# Patient Record
Sex: MALE | Race: WHITE | Employment: FULL TIME | URBAN - METROPOLITAN AREA
[De-identification: names, ages, dates, MRNs, and addresses within clinical notes are randomized per-mention and may not be internally consistent; named-entity substitution may affect disease eponyms.]

---

## 2017-06-07 DIAGNOSIS — I10 ESSENTIAL HYPERTENSION: ICD-10-CM

## 2017-06-09 RX ORDER — LISINOPRIL 20 MG/1
TABLET ORAL
Qty: 30 TABLET | Refills: 0 | Status: SHIPPED | OUTPATIENT
Start: 2017-06-09 | End: 2017-06-21 | Stop reason: SDUPTHER

## 2017-06-21 ENCOUNTER — OFFICE VISIT (OUTPATIENT)
Dept: FAMILY MEDICINE CLINIC | Age: 62
End: 2017-06-21

## 2017-06-21 VITALS
RESPIRATION RATE: 16 BRPM | DIASTOLIC BLOOD PRESSURE: 78 MMHG | HEIGHT: 71 IN | BODY MASS INDEX: 31.22 KG/M2 | SYSTOLIC BLOOD PRESSURE: 118 MMHG | WEIGHT: 223 LBS | HEART RATE: 88 BPM

## 2017-06-21 DIAGNOSIS — I10 ESSENTIAL HYPERTENSION: ICD-10-CM

## 2017-06-21 DIAGNOSIS — M54.40 CHRONIC LOW BACK PAIN WITH SCIATICA, SCIATICA LATERALITY UNSPECIFIED, UNSPECIFIED BACK PAIN LATERALITY: ICD-10-CM

## 2017-06-21 DIAGNOSIS — Z12.5 SPECIAL SCREENING FOR MALIGNANT NEOPLASM OF PROSTATE: ICD-10-CM

## 2017-06-21 DIAGNOSIS — E78.49 OTHER HYPERLIPIDEMIA: ICD-10-CM

## 2017-06-21 DIAGNOSIS — Z00.00 ROUTINE GENERAL MEDICAL EXAMINATION AT A HEALTH CARE FACILITY: Primary | ICD-10-CM

## 2017-06-21 DIAGNOSIS — G89.29 CHRONIC LOW BACK PAIN WITH SCIATICA, SCIATICA LATERALITY UNSPECIFIED, UNSPECIFIED BACK PAIN LATERALITY: ICD-10-CM

## 2017-06-21 PROCEDURE — 99396 PREV VISIT EST AGE 40-64: CPT | Performed by: FAMILY MEDICINE

## 2017-06-21 RX ORDER — SIMVASTATIN 80 MG
TABLET ORAL
Qty: 90 TABLET | Refills: 3 | Status: SHIPPED | OUTPATIENT
Start: 2017-06-21 | End: 2018-08-21 | Stop reason: SDUPTHER

## 2017-06-21 RX ORDER — LISINOPRIL 20 MG/1
TABLET ORAL
Qty: 90 TABLET | Refills: 3 | Status: SHIPPED | OUTPATIENT
Start: 2017-06-21 | End: 2018-08-07 | Stop reason: SDUPTHER

## 2017-06-21 ASSESSMENT — ENCOUNTER SYMPTOMS
CONSTIPATION: 0
SINUS PRESSURE: 0
DIARRHEA: 0
COUGH: 0
ABDOMINAL PAIN: 0
SHORTNESS OF BREATH: 0
EYES NEGATIVE: 1
SORE THROAT: 0

## 2017-06-21 ASSESSMENT — PATIENT HEALTH QUESTIONNAIRE - PHQ9
SUM OF ALL RESPONSES TO PHQ QUESTIONS 1-9: 0
2. FEELING DOWN, DEPRESSED OR HOPELESS: 0
SUM OF ALL RESPONSES TO PHQ9 QUESTIONS 1 & 2: 0
1. LITTLE INTEREST OR PLEASURE IN DOING THINGS: 0

## 2017-06-24 DIAGNOSIS — Z00.00 ROUTINE GENERAL MEDICAL EXAMINATION AT A HEALTH CARE FACILITY: ICD-10-CM

## 2017-06-24 DIAGNOSIS — I10 ESSENTIAL HYPERTENSION: ICD-10-CM

## 2017-06-24 DIAGNOSIS — Z12.5 SPECIAL SCREENING FOR MALIGNANT NEOPLASM OF PROSTATE: ICD-10-CM

## 2017-06-24 LAB
ALBUMIN SERPL-MCNC: 4.3 G/DL (ref 3.9–4.9)
ALP BLD-CCNC: 72 U/L (ref 35–104)
ALT SERPL-CCNC: 17 U/L (ref 0–41)
ANION GAP SERPL CALCULATED.3IONS-SCNC: 15 MEQ/L (ref 7–13)
AST SERPL-CCNC: 17 U/L (ref 0–40)
BASOPHILS ABSOLUTE: 0.1 K/UL (ref 0–0.2)
BASOPHILS RELATIVE PERCENT: 1.4 %
BILIRUB SERPL-MCNC: 0.8 MG/DL (ref 0–1.2)
BUN BLDV-MCNC: 10 MG/DL (ref 8–23)
CALCIUM SERPL-MCNC: 9.2 MG/DL (ref 8.6–10.2)
CHLORIDE BLD-SCNC: 103 MEQ/L (ref 98–107)
CHOLESTEROL, TOTAL: 179 MG/DL (ref 0–199)
CO2: 23 MEQ/L (ref 22–29)
CREAT SERPL-MCNC: 0.97 MG/DL (ref 0.7–1.2)
EOSINOPHILS ABSOLUTE: 0.4 K/UL (ref 0–0.7)
EOSINOPHILS RELATIVE PERCENT: 8.9 %
GFR AFRICAN AMERICAN: >60
GFR NON-AFRICAN AMERICAN: >60
GLOBULIN: 2.2 G/DL (ref 2.3–3.5)
GLUCOSE BLD-MCNC: 124 MG/DL (ref 74–109)
HCT VFR BLD CALC: 43.2 % (ref 42–52)
HDLC SERPL-MCNC: 46 MG/DL (ref 40–59)
HEMOGLOBIN: 14.5 G/DL (ref 14–18)
LDL CHOLESTEROL CALCULATED: 109 MG/DL (ref 0–129)
LYMPHOCYTES ABSOLUTE: 0.7 K/UL (ref 1–4.8)
LYMPHOCYTES RELATIVE PERCENT: 16.9 %
MCH RBC QN AUTO: 29.4 PG (ref 27–31.3)
MCHC RBC AUTO-ENTMCNC: 33.7 % (ref 33–37)
MCV RBC AUTO: 87.4 FL (ref 80–100)
MONOCYTES ABSOLUTE: 0.6 K/UL (ref 0.2–0.8)
MONOCYTES RELATIVE PERCENT: 15.1 %
NEUTROPHILS ABSOLUTE: 2.4 K/UL (ref 1.4–6.5)
NEUTROPHILS RELATIVE PERCENT: 57.7 %
PDW BLD-RTO: 13.5 % (ref 11.5–14.5)
PLATELET # BLD: 237 K/UL (ref 130–400)
POTASSIUM SERPL-SCNC: 4.6 MEQ/L (ref 3.5–5.1)
PROSTATE SPECIFIC ANTIGEN: 1.33 NG/ML (ref 0–5.4)
RBC # BLD: 4.94 M/UL (ref 4.7–6.1)
SODIUM BLD-SCNC: 141 MEQ/L (ref 132–144)
TOTAL PROTEIN: 6.5 G/DL (ref 6.4–8.1)
TRIGL SERPL-MCNC: 118 MG/DL (ref 0–200)
WBC # BLD: 4.1 K/UL (ref 4.8–10.8)

## 2017-09-06 ENCOUNTER — OFFICE VISIT (OUTPATIENT)
Dept: FAMILY MEDICINE CLINIC | Age: 62
End: 2017-09-06

## 2017-09-06 VITALS
BODY MASS INDEX: 30.97 KG/M2 | DIASTOLIC BLOOD PRESSURE: 88 MMHG | HEIGHT: 71 IN | RESPIRATION RATE: 16 BRPM | TEMPERATURE: 97.7 F | HEART RATE: 92 BPM | WEIGHT: 221.2 LBS | SYSTOLIC BLOOD PRESSURE: 136 MMHG

## 2017-09-06 DIAGNOSIS — I10 ESSENTIAL HYPERTENSION: ICD-10-CM

## 2017-09-06 DIAGNOSIS — H65.92 SEROMUCINOUS OTITIS MEDIA, LEFT: Primary | ICD-10-CM

## 2017-09-06 PROCEDURE — 99213 OFFICE O/P EST LOW 20 MIN: CPT | Performed by: FAMILY MEDICINE

## 2017-09-06 RX ORDER — CEFUROXIME AXETIL 250 MG/1
250 TABLET ORAL 2 TIMES DAILY
Qty: 20 TABLET | Refills: 0 | Status: SHIPPED | OUTPATIENT
Start: 2017-09-06 | End: 2017-09-16

## 2017-09-06 ASSESSMENT — ENCOUNTER SYMPTOMS
SHORTNESS OF BREATH: 0
ABDOMINAL PAIN: 0
DIARRHEA: 0
COUGH: 0

## 2017-09-15 RX ORDER — AZITHROMYCIN 250 MG/1
TABLET, FILM COATED ORAL
Qty: 6 TABLET | Refills: 0 | Status: SHIPPED | OUTPATIENT
Start: 2017-09-15 | End: 2018-08-21 | Stop reason: ALTCHOICE

## 2018-06-18 ENCOUNTER — TELEPHONE (OUTPATIENT)
Dept: FAMILY MEDICINE CLINIC | Age: 63
End: 2018-06-18

## 2018-06-18 RX ORDER — PREDNISONE 10 MG/1
TABLET ORAL
Qty: 24 TABLET | Refills: 0 | Status: SHIPPED | OUTPATIENT
Start: 2018-06-18 | End: 2018-12-07 | Stop reason: SDUPTHER

## 2018-08-07 DIAGNOSIS — I10 ESSENTIAL HYPERTENSION: ICD-10-CM

## 2018-08-08 RX ORDER — LISINOPRIL 20 MG/1
TABLET ORAL
Qty: 30 TABLET | Refills: 0 | Status: SHIPPED | OUTPATIENT
Start: 2018-08-08 | End: 2018-08-21 | Stop reason: SDUPTHER

## 2018-08-21 ENCOUNTER — OFFICE VISIT (OUTPATIENT)
Dept: FAMILY MEDICINE CLINIC | Age: 63
End: 2018-08-21
Payer: MEDICARE

## 2018-08-21 VITALS
HEART RATE: 60 BPM | SYSTOLIC BLOOD PRESSURE: 124 MMHG | TEMPERATURE: 96.6 F | BODY MASS INDEX: 30.8 KG/M2 | HEIGHT: 71 IN | WEIGHT: 220 LBS | RESPIRATION RATE: 12 BRPM | DIASTOLIC BLOOD PRESSURE: 80 MMHG

## 2018-08-21 DIAGNOSIS — Z12.5 SPECIAL SCREENING FOR MALIGNANT NEOPLASM OF PROSTATE: ICD-10-CM

## 2018-08-21 DIAGNOSIS — E78.49 OTHER HYPERLIPIDEMIA: ICD-10-CM

## 2018-08-21 DIAGNOSIS — S39.012A BACK STRAIN, INITIAL ENCOUNTER: ICD-10-CM

## 2018-08-21 DIAGNOSIS — R73.9 ELEVATED BLOOD SUGAR: ICD-10-CM

## 2018-08-21 DIAGNOSIS — M19.90 OSTEOARTHRITIS, UNSPECIFIED OSTEOARTHRITIS TYPE, UNSPECIFIED SITE: ICD-10-CM

## 2018-08-21 DIAGNOSIS — I10 ESSENTIAL HYPERTENSION: Primary | ICD-10-CM

## 2018-08-21 DIAGNOSIS — Z23 NEED FOR SHINGLES VACCINE: ICD-10-CM

## 2018-08-21 DIAGNOSIS — I10 ESSENTIAL HYPERTENSION: ICD-10-CM

## 2018-08-21 DIAGNOSIS — Z23 NEED FOR PNEUMOCOCCAL VACCINATION: ICD-10-CM

## 2018-08-21 LAB
ALBUMIN SERPL-MCNC: 4.3 G/DL (ref 3.9–4.9)
ALP BLD-CCNC: 67 U/L (ref 35–104)
ALT SERPL-CCNC: 16 U/L (ref 0–41)
ANION GAP SERPL CALCULATED.3IONS-SCNC: 15 MEQ/L (ref 7–13)
AST SERPL-CCNC: 19 U/L (ref 0–40)
BASOPHILS ABSOLUTE: 0 K/UL (ref 0–0.2)
BASOPHILS RELATIVE PERCENT: 1.1 %
BILIRUB SERPL-MCNC: 0.7 MG/DL (ref 0–1.2)
BUN BLDV-MCNC: 13 MG/DL (ref 8–23)
CALCIUM SERPL-MCNC: 9.5 MG/DL (ref 8.6–10.2)
CHLORIDE BLD-SCNC: 99 MEQ/L (ref 98–107)
CHOLESTEROL, TOTAL: 189 MG/DL (ref 0–199)
CO2: 22 MEQ/L (ref 22–29)
CREAT SERPL-MCNC: 0.94 MG/DL (ref 0.7–1.2)
EOSINOPHILS ABSOLUTE: 0.2 K/UL (ref 0–0.7)
EOSINOPHILS RELATIVE PERCENT: 5.3 %
GFR AFRICAN AMERICAN: >60
GFR NON-AFRICAN AMERICAN: >60
GLOBULIN: 2.8 G/DL (ref 2.3–3.5)
GLUCOSE BLD-MCNC: 146 MG/DL (ref 74–109)
HBA1C MFR BLD: 7.1 % (ref 4.8–5.9)
HCT VFR BLD CALC: 46.6 % (ref 42–52)
HDLC SERPL-MCNC: 42 MG/DL (ref 40–59)
HEMOGLOBIN: 16 G/DL (ref 14–18)
LDL CHOLESTEROL CALCULATED: 117 MG/DL (ref 0–129)
LYMPHOCYTES ABSOLUTE: 0.6 K/UL (ref 1–4.8)
LYMPHOCYTES RELATIVE PERCENT: 14.2 %
MCH RBC QN AUTO: 30.6 PG (ref 27–31.3)
MCHC RBC AUTO-ENTMCNC: 34.3 % (ref 33–37)
MCV RBC AUTO: 89.4 FL (ref 80–100)
MONOCYTES ABSOLUTE: 0.4 K/UL (ref 0.2–0.8)
MONOCYTES RELATIVE PERCENT: 10.3 %
NEUTROPHILS ABSOLUTE: 2.7 K/UL (ref 1.4–6.5)
NEUTROPHILS RELATIVE PERCENT: 69.1 %
PDW BLD-RTO: 13.9 % (ref 11.5–14.5)
PLATELET # BLD: 258 K/UL (ref 130–400)
POTASSIUM SERPL-SCNC: 4.4 MEQ/L (ref 3.5–5.1)
PROSTATE SPECIFIC ANTIGEN: 1.32 NG/ML (ref 0–5.4)
RBC # BLD: 5.21 M/UL (ref 4.7–6.1)
SLIDE REVIEW: ABNORMAL
SODIUM BLD-SCNC: 136 MEQ/L (ref 132–144)
TOTAL PROTEIN: 7.1 G/DL (ref 6.4–8.1)
TRIGL SERPL-MCNC: 148 MG/DL (ref 0–200)
WBC # BLD: 3.9 K/UL (ref 4.8–10.8)

## 2018-08-21 PROCEDURE — G0009 ADMIN PNEUMOCOCCAL VACCINE: HCPCS | Performed by: FAMILY MEDICINE

## 2018-08-21 PROCEDURE — 90670 PCV13 VACCINE IM: CPT | Performed by: FAMILY MEDICINE

## 2018-08-21 PROCEDURE — 99214 OFFICE O/P EST MOD 30 MIN: CPT | Performed by: FAMILY MEDICINE

## 2018-08-21 RX ORDER — SIMVASTATIN 80 MG
TABLET ORAL
Qty: 90 TABLET | Refills: 3 | Status: SHIPPED | OUTPATIENT
Start: 2018-08-21

## 2018-08-21 RX ORDER — DICLOFENAC SODIUM 75 MG/1
75 TABLET, DELAYED RELEASE ORAL 2 TIMES DAILY WITH MEALS
Qty: 180 TABLET | Refills: 3 | Status: SHIPPED | OUTPATIENT
Start: 2018-08-21

## 2018-08-21 RX ORDER — LISINOPRIL 20 MG/1
TABLET ORAL
Qty: 90 TABLET | Refills: 3 | Status: SHIPPED | OUTPATIENT
Start: 2018-08-21

## 2018-08-21 ASSESSMENT — ENCOUNTER SYMPTOMS
CONSTIPATION: 0
ABDOMINAL PAIN: 0
COUGH: 0
DIARRHEA: 0
SHORTNESS OF BREATH: 0
EYES NEGATIVE: 1
NAUSEA: 0

## 2018-08-21 ASSESSMENT — PATIENT HEALTH QUESTIONNAIRE - PHQ9
SUM OF ALL RESPONSES TO PHQ QUESTIONS 1-9: 0
SUM OF ALL RESPONSES TO PHQ9 QUESTIONS 1 & 2: 0
2. FEELING DOWN, DEPRESSED OR HOPELESS: 0
SUM OF ALL RESPONSES TO PHQ QUESTIONS 1-9: 0
1. LITTLE INTEREST OR PLEASURE IN DOING THINGS: 0

## 2018-08-21 NOTE — PATIENT INSTRUCTIONS
Nathaniel.. [facility]    [address] [facility]  Sarah GARRISON 77 [phone]    '

## 2018-08-22 ENCOUNTER — TELEPHONE (OUTPATIENT)
Dept: FAMILY MEDICINE CLINIC | Age: 63
End: 2018-08-22

## 2018-10-08 ENCOUNTER — NURSE ONLY (OUTPATIENT)
Dept: FAMILY MEDICINE CLINIC | Age: 63
End: 2018-10-08
Payer: MEDICARE

## 2018-10-08 DIAGNOSIS — Z23 NEED FOR INFLUENZA VACCINATION: Primary | ICD-10-CM

## 2018-10-08 PROCEDURE — 90662 IIV NO PRSV INCREASED AG IM: CPT | Performed by: FAMILY MEDICINE

## 2018-10-08 PROCEDURE — G0008 ADMIN INFLUENZA VIRUS VAC: HCPCS | Performed by: FAMILY MEDICINE

## 2018-11-29 ENCOUNTER — TELEPHONE (OUTPATIENT)
Dept: FAMILY MEDICINE CLINIC | Age: 63
End: 2018-11-29

## 2018-11-29 DIAGNOSIS — D50.8 OTHER IRON DEFICIENCY ANEMIA: ICD-10-CM

## 2018-11-29 DIAGNOSIS — I10 ESSENTIAL HYPERTENSION: Primary | ICD-10-CM

## 2018-11-29 DIAGNOSIS — R73.9 ELEVATED BLOOD SUGAR: ICD-10-CM

## 2018-11-29 LAB
BASOPHILS ABSOLUTE: 0 K/UL (ref 0–0.2)
BASOPHILS RELATIVE PERCENT: 0.3 %
EOSINOPHILS ABSOLUTE: 0.1 K/UL (ref 0–0.7)
EOSINOPHILS RELATIVE PERCENT: 1.3 %
HBA1C MFR BLD: 6.2 % (ref 4.8–5.9)
HCT VFR BLD CALC: 45.1 % (ref 42–52)
HEMOGLOBIN: 15.5 G/DL (ref 14–18)
IRON SATURATION: 28 % (ref 11–46)
IRON: 97 UG/DL (ref 59–158)
LYMPHOCYTES ABSOLUTE: 0.8 K/UL (ref 1–4.8)
LYMPHOCYTES RELATIVE PERCENT: 10.5 %
MCH RBC QN AUTO: 31.1 PG (ref 27–31.3)
MCHC RBC AUTO-ENTMCNC: 34.4 % (ref 33–37)
MCV RBC AUTO: 90.5 FL (ref 80–100)
MONOCYTES ABSOLUTE: 0.6 K/UL (ref 0.2–0.8)
MONOCYTES RELATIVE PERCENT: 8.3 %
NEUTROPHILS ABSOLUTE: 6.1 K/UL (ref 1.4–6.5)
NEUTROPHILS RELATIVE PERCENT: 79.6 %
PDW BLD-RTO: 13.8 % (ref 11.5–14.5)
PLATELET # BLD: 267 K/UL (ref 130–400)
RBC # BLD: 4.99 M/UL (ref 4.7–6.1)
TOTAL IRON BINDING CAPACITY: 352 UG/DL (ref 178–450)
WBC # BLD: 7.7 K/UL (ref 4.8–10.8)

## 2018-12-07 ENCOUNTER — OFFICE VISIT (OUTPATIENT)
Dept: FAMILY MEDICINE CLINIC | Age: 63
End: 2018-12-07
Payer: MEDICARE

## 2018-12-07 VITALS
DIASTOLIC BLOOD PRESSURE: 80 MMHG | SYSTOLIC BLOOD PRESSURE: 124 MMHG | WEIGHT: 208.6 LBS | HEART RATE: 76 BPM | RESPIRATION RATE: 12 BRPM | HEIGHT: 71 IN | BODY MASS INDEX: 29.2 KG/M2 | TEMPERATURE: 96.2 F

## 2018-12-07 DIAGNOSIS — L30.9 ECZEMA, UNSPECIFIED TYPE: Primary | ICD-10-CM

## 2018-12-07 DIAGNOSIS — I10 ESSENTIAL HYPERTENSION: ICD-10-CM

## 2018-12-07 PROCEDURE — 99213 OFFICE O/P EST LOW 20 MIN: CPT | Performed by: FAMILY MEDICINE

## 2018-12-07 RX ORDER — PREDNISONE 10 MG/1
TABLET ORAL
Qty: 24 TABLET | Refills: 0 | Status: SHIPPED | OUTPATIENT
Start: 2018-12-07 | End: 2018-12-17

## 2018-12-07 RX ORDER — TRIAMCINOLONE ACETONIDE 1 MG/G
CREAM TOPICAL
Qty: 45 G | Refills: 1 | Status: SHIPPED | OUTPATIENT
Start: 2018-12-07

## 2018-12-07 ASSESSMENT — ENCOUNTER SYMPTOMS
CONSTIPATION: 0
EYES NEGATIVE: 1
ABDOMINAL PAIN: 0
COUGH: 0
SHORTNESS OF BREATH: 0
DIARRHEA: 0
NAUSEA: 0

## 2019-03-13 ENCOUNTER — TELEPHONE (OUTPATIENT)
Dept: ADMINISTRATIVE | Age: 64
End: 2019-03-13

## 2023-03-14 LAB
ANION GAP IN SER/PLAS: 11 MMOL/L (ref 10–20)
CALCIUM (MG/DL) IN SER/PLAS: 9.4 MG/DL (ref 8.6–10.3)
CARBON DIOXIDE, TOTAL (MMOL/L) IN SER/PLAS: 27 MMOL/L (ref 21–32)
CHLORIDE (MMOL/L) IN SER/PLAS: 107 MMOL/L (ref 98–107)
CREATININE (MG/DL) IN SER/PLAS: 1.29 MG/DL (ref 0.5–1.3)
ERYTHROCYTE DISTRIBUTION WIDTH (RATIO) BY AUTOMATED COUNT: 14.6 % (ref 11.5–14.5)
ERYTHROCYTE MEAN CORPUSCULAR HEMOGLOBIN CONCENTRATION (G/DL) BY AUTOMATED: 29.5 G/DL (ref 32–36)
ERYTHROCYTE MEAN CORPUSCULAR VOLUME (FL) BY AUTOMATED COUNT: 85 FL (ref 80–100)
ERYTHROCYTES (10*6/UL) IN BLOOD BY AUTOMATED COUNT: 4.53 X10E12/L (ref 4.5–5.9)
GFR MALE: 60 ML/MIN/1.73M2
GLUCOSE (MG/DL) IN SER/PLAS: 120 MG/DL (ref 74–99)
HEMATOCRIT (%) IN BLOOD BY AUTOMATED COUNT: 38.6 % (ref 41–52)
HEMOGLOBIN (G/DL) IN BLOOD: 11.4 G/DL (ref 13.5–17.5)
LEUKOCYTES (10*3/UL) IN BLOOD BY AUTOMATED COUNT: 4.9 X10E9/L (ref 4.4–11.3)
PLATELETS (10*3/UL) IN BLOOD AUTOMATED COUNT: 355 X10E9/L (ref 150–450)
POTASSIUM (MMOL/L) IN SER/PLAS: 5.1 MMOL/L (ref 3.5–5.3)
SODIUM (MMOL/L) IN SER/PLAS: 140 MMOL/L (ref 136–145)
UREA NITROGEN (MG/DL) IN SER/PLAS: 17 MG/DL (ref 6–23)

## 2023-06-05 ENCOUNTER — HOSPITAL ENCOUNTER (OUTPATIENT)
Dept: SLEEP CENTER | Age: 68
Discharge: HOME | End: 2023-06-07
Payer: MEDICARE

## 2023-06-05 PROCEDURE — 95806 SLEEP STUDY UNATT&RESP EFFT: CPT

## 2023-06-17 DIAGNOSIS — I48.19 ATRIAL FIBRILLATION, PERSISTENT (MULTI): ICD-10-CM

## 2023-06-17 DIAGNOSIS — I10 HYPERTENSION, UNSPECIFIED TYPE: ICD-10-CM

## 2023-06-19 NOTE — TELEPHONE ENCOUNTER
Please call and assist patient in scheduling an appointment for further refills of Eliquis and Lisinopril. If patient needs a short term supply, please let us know.

## 2023-06-21 RX ORDER — LISINOPRIL 20 MG/1
TABLET ORAL
Qty: 30 TABLET | Refills: 0 | Status: SHIPPED | OUTPATIENT
Start: 2023-06-21 | End: 2023-07-18 | Stop reason: SDUPTHER

## 2023-06-21 RX ORDER — APIXABAN 5 MG/1
TABLET, FILM COATED ORAL
Qty: 60 TABLET | Refills: 0 | Status: SHIPPED | OUTPATIENT
Start: 2023-06-21 | End: 2023-07-18 | Stop reason: SDUPTHER

## 2023-06-21 NOTE — TELEPHONE ENCOUNTER
Patient scheduled an appointment on 7/18/23. Pt is out of mediation. Can we send in a refill please to [facility]

## 2023-07-17 PROBLEM — G47.33 OSA (OBSTRUCTIVE SLEEP APNEA): Status: ACTIVE | Noted: 2023-07-17

## 2023-07-18 ENCOUNTER — OFFICE VISIT (OUTPATIENT)
Dept: PRIMARY CARE | Facility: CLINIC | Age: 68
End: 2023-07-18
Payer: MEDICARE

## 2023-07-18 VITALS
TEMPERATURE: 98.2 F | HEIGHT: 71 IN | HEART RATE: 74 BPM | RESPIRATION RATE: 16 BRPM | OXYGEN SATURATION: 96 % | WEIGHT: 218.6 LBS | DIASTOLIC BLOOD PRESSURE: 64 MMHG | BODY MASS INDEX: 30.6 KG/M2 | SYSTOLIC BLOOD PRESSURE: 122 MMHG

## 2023-07-18 DIAGNOSIS — I48.0 PAROXYSMAL ATRIAL FIBRILLATION (MULTI): ICD-10-CM

## 2023-07-18 DIAGNOSIS — Z12.5 SCREENING PSA (PROSTATE SPECIFIC ANTIGEN): ICD-10-CM

## 2023-07-18 DIAGNOSIS — K21.9 GERD WITHOUT ESOPHAGITIS: ICD-10-CM

## 2023-07-18 DIAGNOSIS — E78.00 HYPERCHOLESTEROLEMIA: ICD-10-CM

## 2023-07-18 DIAGNOSIS — I10 HYPERTENSION, UNSPECIFIED TYPE: ICD-10-CM

## 2023-07-18 DIAGNOSIS — E66.09 CLASS 1 OBESITY DUE TO EXCESS CALORIES WITH SERIOUS COMORBIDITY AND BODY MASS INDEX (BMI) OF 30.0 TO 30.9 IN ADULT: ICD-10-CM

## 2023-07-18 DIAGNOSIS — M15.9 PRIMARY OSTEOARTHRITIS INVOLVING MULTIPLE JOINTS: ICD-10-CM

## 2023-07-18 DIAGNOSIS — Z00.00 MEDICARE ANNUAL WELLNESS VISIT, SUBSEQUENT: Primary | ICD-10-CM

## 2023-07-18 DIAGNOSIS — I10 PRIMARY HYPERTENSION: ICD-10-CM

## 2023-07-18 DIAGNOSIS — N18.31 CHRONIC KIDNEY DISEASE, STAGE 3A (MULTI): ICD-10-CM

## 2023-07-18 DIAGNOSIS — I48.19 ATRIAL FIBRILLATION, PERSISTENT (MULTI): ICD-10-CM

## 2023-07-18 PROBLEM — R94.31 ABNORMAL EKG: Status: ACTIVE | Noted: 2023-07-18

## 2023-07-18 PROBLEM — E61.1 LOW IRON: Status: ACTIVE | Noted: 2023-07-18

## 2023-07-18 PROBLEM — R06.83 SNORING: Status: ACTIVE | Noted: 2023-07-18

## 2023-07-18 PROBLEM — L30.9 ECZEMA: Status: ACTIVE | Noted: 2023-07-18

## 2023-07-18 PROBLEM — M15.0 PRIMARY OSTEOARTHRITIS INVOLVING MULTIPLE JOINTS: Status: ACTIVE | Noted: 2023-07-18

## 2023-07-18 PROBLEM — N40.0 BPH (BENIGN PROSTATIC HYPERPLASIA): Status: ACTIVE | Noted: 2023-07-18

## 2023-07-18 PROBLEM — M54.50 LOWER BACK PAIN: Status: ACTIVE | Noted: 2023-07-18

## 2023-07-18 PROBLEM — R53.83 FATIGUE: Status: ACTIVE | Noted: 2023-07-18

## 2023-07-18 PROBLEM — R19.5 POSITIVE COLORECTAL CANCER SCREENING USING COLOGUARD TEST: Status: ACTIVE | Noted: 2023-07-18

## 2023-07-18 PROBLEM — M19.90 ARTHRITIS: Status: ACTIVE | Noted: 2023-07-18

## 2023-07-18 PROBLEM — I49.9 IRREGULAR HEART BEAT: Status: ACTIVE | Noted: 2023-07-18

## 2023-07-18 PROBLEM — I34.0 NONRHEUMATIC MITRAL VALVE REGURGITATION: Status: ACTIVE | Noted: 2023-07-18

## 2023-07-18 PROBLEM — I49.5 SINUS NODE DYSFUNCTION (MULTI): Status: ACTIVE | Noted: 2023-07-18

## 2023-07-18 PROBLEM — L40.9 PSORIASIS: Status: ACTIVE | Noted: 2023-07-18

## 2023-07-18 PROBLEM — R71.8 RED BLOOD CELL ABNORMALITY: Status: ACTIVE | Noted: 2023-07-18

## 2023-07-18 PROCEDURE — 3074F SYST BP LT 130 MM HG: CPT | Performed by: FAMILY MEDICINE

## 2023-07-18 PROCEDURE — 1170F FXNL STATUS ASSESSED: CPT | Performed by: FAMILY MEDICINE

## 2023-07-18 PROCEDURE — 99213 OFFICE O/P EST LOW 20 MIN: CPT | Performed by: FAMILY MEDICINE

## 2023-07-18 PROCEDURE — 1036F TOBACCO NON-USER: CPT | Performed by: FAMILY MEDICINE

## 2023-07-18 PROCEDURE — 1159F MED LIST DOCD IN RCRD: CPT | Performed by: FAMILY MEDICINE

## 2023-07-18 PROCEDURE — 3078F DIAST BP <80 MM HG: CPT | Performed by: FAMILY MEDICINE

## 2023-07-18 PROCEDURE — 3008F BODY MASS INDEX DOCD: CPT | Performed by: FAMILY MEDICINE

## 2023-07-18 PROCEDURE — 1160F RVW MEDS BY RX/DR IN RCRD: CPT | Performed by: FAMILY MEDICINE

## 2023-07-18 PROCEDURE — G0439 PPPS, SUBSEQ VISIT: HCPCS | Performed by: FAMILY MEDICINE

## 2023-07-18 RX ORDER — LANOLIN ALCOHOL/MO/W.PET/CERES
1 CREAM (GRAM) TOPICAL DAILY
COMMUNITY

## 2023-07-18 RX ORDER — LISINOPRIL 20 MG/1
20 TABLET ORAL DAILY
Qty: 90 TABLET | Refills: 1 | Status: SHIPPED | OUTPATIENT
Start: 2023-07-18 | End: 2023-11-15

## 2023-07-18 RX ORDER — FERROUS SULFATE, DRIED 159(45)MG
45 TABLET, EXTENDED RELEASE ORAL
COMMUNITY
End: 2023-11-15 | Stop reason: ALTCHOICE

## 2023-07-18 RX ORDER — OMEPRAZOLE 20 MG/1
20 TABLET, DELAYED RELEASE ORAL DAILY
Qty: 30 TABLET | Refills: 4 | COMMUNITY
Start: 2023-07-18 | End: 2023-08-01 | Stop reason: SDUPTHER

## 2023-07-18 RX ORDER — DICLOFENAC SODIUM 75 MG/1
75 TABLET, DELAYED RELEASE ORAL 2 TIMES DAILY
Qty: 180 TABLET | Refills: 1 | Status: CANCELLED | OUTPATIENT
Start: 2023-07-18

## 2023-07-18 RX ORDER — DICLOFENAC SODIUM 75 MG/1
1 TABLET, DELAYED RELEASE ORAL 2 TIMES DAILY
COMMUNITY
Start: 2018-08-21 | End: 2023-07-18 | Stop reason: ALTCHOICE

## 2023-07-18 RX ORDER — SIMVASTATIN 40 MG/1
40 TABLET, FILM COATED ORAL NIGHTLY
Qty: 90 TABLET | Refills: 1 | Status: SHIPPED | OUTPATIENT
Start: 2023-07-18 | End: 2024-01-29 | Stop reason: SDUPTHER

## 2023-07-18 RX ORDER — SIMVASTATIN 40 MG/1
1 TABLET, FILM COATED ORAL NIGHTLY
COMMUNITY
End: 2023-07-18 | Stop reason: SDUPTHER

## 2023-07-18 RX ORDER — DOFETILIDE 0.25 MG/1
250 CAPSULE ORAL 2 TIMES DAILY
Qty: 180 CAPSULE | Refills: 1 | Status: SHIPPED | OUTPATIENT
Start: 2023-07-18 | End: 2024-01-29 | Stop reason: SDUPTHER

## 2023-07-18 RX ORDER — TRIAMCINOLONE ACETONIDE 1 MG/G
CREAM TOPICAL 2 TIMES DAILY
COMMUNITY
Start: 2018-12-07 | End: 2023-11-15 | Stop reason: ALTCHOICE

## 2023-07-18 RX ORDER — DOFETILIDE 0.25 MG/1
1 CAPSULE ORAL 2 TIMES DAILY
COMMUNITY
End: 2023-07-18 | Stop reason: SDUPTHER

## 2023-07-18 ASSESSMENT — ACTIVITIES OF DAILY LIVING (ADL)
TAKING_MEDICATION: INDEPENDENT
DRESSING: INDEPENDENT
DOING_HOUSEWORK: INDEPENDENT
BATHING: INDEPENDENT
MANAGING_FINANCES: INDEPENDENT
GROCERY_SHOPPING: INDEPENDENT

## 2023-07-18 ASSESSMENT — PATIENT HEALTH QUESTIONNAIRE - PHQ9
1. LITTLE INTEREST OR PLEASURE IN DOING THINGS: NOT AT ALL
SUM OF ALL RESPONSES TO PHQ9 QUESTIONS 1 AND 2: 0
2. FEELING DOWN, DEPRESSED OR HOPELESS: NOT AT ALL

## 2023-07-18 ASSESSMENT — ENCOUNTER SYMPTOMS: DEPRESSION: 0

## 2023-07-18 NOTE — PROGRESS NOTES
"Subjective   Patient ID: Vic Wise is a 68 y.o. male who presents for Medicare Annual Wellness Visit Subsequent and Hypertension.    HPI    Patient presents today for AWV, HTN follow up.  He does try to stick to a low sodium, low cholesterol diet.  Does exercise some.  Denies chest pain or SOB.    Would like PCP to take over Dofetilide prescription. Cardiologist said it would be fine.    Taking current medications which were reviewed.  Problem list discussed.    Overall doing well.  Eating okay.  Staying active.    Has no other new problem /question.        ROS  Constitutional- No activity change. No appetite change.  Eyes- Denies vision changes.  Respiratory- No shortness of breath.  Cardiovascular- No palpitations. No chest pain.  GI- No nausea or vomiting. No diarrhea or constipation. Denies abdominal pain.  Musculoskeletal- Denies joint swelling.  Extremities- No edema.  Neurological- Denies headaches. Denies dizziness.  Skin- No rashes.  Psychiatric/Behavioral- Denies significant anxiety, or depressed mood.     Objective     /64 (BP Location: Right arm, Patient Position: Sitting, BP Cuff Size: Adult)   Pulse 74   Temp 36.8 °C (98.2 °F) (Temporal)   Resp 16   Ht 1.803 m (5' 11\")   Wt 99.2 kg (218 lb 9.6 oz)   SpO2 96%   BMI 30.49 kg/m²     No Known Allergies    Constitutional-- Well-nourished.  No distress  Head- unremarkable.  Ears- TMs clear.  Eyes- PERRL.  Conjunctiva normal.  Nose- Normal.  No rhinorrhea noted.  Throat- Oropharynx is clear and moist.  Neck- Supple with no thyromegaly.  No significant cervical adenopathy noted.  Pulmonary/Chest- Breath sounds normal with normal effort.  No wheezing.  Heart- Regular rate and rhythm.  No murmur.  Abdomen- Soft and non-tender.  No masses noted.  Musculoskeletal- Normal ROM.  No significant joint swelling  Extremities- No edema.   Neurological- Alert.  No noted deficits.  Skin- Warm.  No rashes.  Psychiatric/Behavioral- Mood and affect normal.  " Behavior normal.     Assessment/Plan   1. Medicare annual wellness visit, subsequent        2. Primary hypertension controlled on meds       3. Hypercholesterolemia stable Lipid Panel    simvastatin (Zocor) 40 mg tablet      4. Screening PSA (prostate specific antigen)  Prostate Specific Antigen, Screen      5. Atrial fibrillation, persistent (CMS/HCC)  apixaban (Eliquis) 5 mg tablet      6. Hypertension, unspecified type  lisinopril 20 mg tablet      7. Paroxysmal atrial fibrillation (CMS/HCC) converted on meds.  Sees cardiology dofetilide (Tikosyn) 250 mcg capsule      8. Primary osteoarthritis involving multiple joints stable       9. BMI 30.0-30.9,adult        10. Class 1 obesity due to excess calories with serious comorbidity and body mass index (BMI) of 30.0 to 30.9 in adult        11. Chronic kidney disease, stage 3a        12. GERD without esophagitis stable omeprazole OTC (PriLOSEC OTC) 20 mg EC tablet             Long talk. Treatment options reviewed.    Medicare wellness questionnaire reviewed in detail. Advanced Directives reviewed today. Patient advised to provide the office with a copy if has not already done so. No problems with activities of daily living. Falls risks reviewed.    Discussed patient's BMI and to institute calorie reduction and increase exercise to decrease risk of diabetes and heart disease in the future.    Continue and take your medications as prescribed.    Health Maintenance issues discussed.    Importance of healthy diet and regular exercise regimen discussed.    We will contact you with any test results ordered. If you do not hear from us, please contact.    Follow-up as instructed or sooner if any problems or symptoms do not resolve as expected.

## 2023-07-28 ENCOUNTER — LAB (OUTPATIENT)
Dept: LAB | Facility: LAB | Age: 68
End: 2023-07-28
Payer: MEDICARE

## 2023-07-28 DIAGNOSIS — Z12.5 SCREENING PSA (PROSTATE SPECIFIC ANTIGEN): ICD-10-CM

## 2023-07-28 DIAGNOSIS — E78.00 HYPERCHOLESTEROLEMIA: ICD-10-CM

## 2023-07-28 LAB
ALANINE AMINOTRANSFERASE (SGPT) (U/L) IN SER/PLAS: 18 U/L (ref 10–52)
ALBUMIN (G/DL) IN SER/PLAS: 4.1 G/DL (ref 3.4–5)
ALKALINE PHOSPHATASE (U/L) IN SER/PLAS: 73 U/L (ref 33–136)
ANION GAP IN SER/PLAS: 14 MMOL/L (ref 10–20)
ASPARTATE AMINOTRANSFERASE (SGOT) (U/L) IN SER/PLAS: 20 U/L (ref 9–39)
BASOPHILS (10*3/UL) IN BLOOD BY AUTOMATED COUNT: 0.04 X10E9/L (ref 0–0.1)
BASOPHILS/100 LEUKOCYTES IN BLOOD BY AUTOMATED COUNT: 0.8 % (ref 0–2)
BILIRUBIN TOTAL (MG/DL) IN SER/PLAS: 0.5 MG/DL (ref 0–1.2)
C REACTIVE PROTEIN (MG/L) IN SER/PLAS: 0.96 MG/DL
CALCIUM (MG/DL) IN SER/PLAS: 9.7 MG/DL (ref 8.6–10.3)
CARBON DIOXIDE, TOTAL (MMOL/L) IN SER/PLAS: 27 MMOL/L (ref 21–32)
CHLORIDE (MMOL/L) IN SER/PLAS: 102 MMOL/L (ref 98–107)
CHOLESTEROL (MG/DL) IN SER/PLAS: 146 MG/DL (ref 0–199)
CHOLESTEROL IN HDL (MG/DL) IN SER/PLAS: 31.9 MG/DL
CHOLESTEROL/HDL RATIO: 4.6
CREATININE (MG/DL) IN SER/PLAS: 1.23 MG/DL (ref 0.5–1.3)
EOSINOPHILS (10*3/UL) IN BLOOD BY AUTOMATED COUNT: 0.18 X10E9/L (ref 0–0.7)
EOSINOPHILS/100 LEUKOCYTES IN BLOOD BY AUTOMATED COUNT: 3.4 % (ref 0–6)
ERYTHROCYTE DISTRIBUTION WIDTH (RATIO) BY AUTOMATED COUNT: 15.6 % (ref 11.5–14.5)
ERYTHROCYTE MEAN CORPUSCULAR HEMOGLOBIN CONCENTRATION (G/DL) BY AUTOMATED: 30.7 G/DL (ref 32–36)
ERYTHROCYTE MEAN CORPUSCULAR VOLUME (FL) BY AUTOMATED COUNT: 82 FL (ref 80–100)
ERYTHROCYTES (10*6/UL) IN BLOOD BY AUTOMATED COUNT: 4.58 X10E12/L (ref 4.5–5.9)
GFR MALE: 63 ML/MIN/1.73M2
GLUCOSE (MG/DL) IN SER/PLAS: 124 MG/DL (ref 74–99)
HEMATOCRIT (%) IN BLOOD BY AUTOMATED COUNT: 37.5 % (ref 41–52)
HEMOGLOBIN (G/DL) IN BLOOD: 11.5 G/DL (ref 13.5–17.5)
IMMATURE GRANULOCYTES/100 LEUKOCYTES IN BLOOD BY AUTOMATED COUNT: 0.4 % (ref 0–0.9)
LDL: 82 MG/DL (ref 0–99)
LEUKOCYTES (10*3/UL) IN BLOOD BY AUTOMATED COUNT: 5.3 X10E9/L (ref 4.4–11.3)
LYMPHOCYTES (10*3/UL) IN BLOOD BY AUTOMATED COUNT: 0.65 X10E9/L (ref 1.2–4.8)
LYMPHOCYTES/100 LEUKOCYTES IN BLOOD BY AUTOMATED COUNT: 12.4 % (ref 13–44)
MONOCYTES (10*3/UL) IN BLOOD BY AUTOMATED COUNT: 0.52 X10E9/L (ref 0.1–1)
MONOCYTES/100 LEUKOCYTES IN BLOOD BY AUTOMATED COUNT: 9.9 % (ref 2–10)
NEUTROPHILS (10*3/UL) IN BLOOD BY AUTOMATED COUNT: 3.84 X10E9/L (ref 1.2–7.7)
NEUTROPHILS/100 LEUKOCYTES IN BLOOD BY AUTOMATED COUNT: 73.1 % (ref 40–80)
PLATELETS (10*3/UL) IN BLOOD AUTOMATED COUNT: 332 X10E9/L (ref 150–450)
POTASSIUM (MMOL/L) IN SER/PLAS: 4.5 MMOL/L (ref 3.5–5.3)
PROSTATE SPECIFIC ANTIGEN,SCREEN: 1.76 NG/ML (ref 0–4)
PROTEIN TOTAL: 7.1 G/DL (ref 6.4–8.2)
SODIUM (MMOL/L) IN SER/PLAS: 138 MMOL/L (ref 136–145)
TRIGLYCERIDE (MG/DL) IN SER/PLAS: 161 MG/DL (ref 0–149)
UREA NITROGEN (MG/DL) IN SER/PLAS: 11 MG/DL (ref 6–23)
VLDL: 32 MG/DL (ref 0–40)

## 2023-07-28 PROCEDURE — 80061 LIPID PANEL: CPT

## 2023-07-28 PROCEDURE — 36415 COLL VENOUS BLD VENIPUNCTURE: CPT

## 2023-07-28 PROCEDURE — G0103 PSA SCREENING: HCPCS

## 2023-08-01 DIAGNOSIS — K21.9 GERD WITHOUT ESOPHAGITIS: ICD-10-CM

## 2023-08-01 RX ORDER — OMEPRAZOLE 20 MG/1
20 TABLET, DELAYED RELEASE ORAL DAILY
Qty: 30 TABLET | Refills: 4 | Status: SHIPPED
Start: 2023-08-01 | End: 2023-11-28 | Stop reason: ALTCHOICE

## 2023-08-01 NOTE — TELEPHONE ENCOUNTER
Pt is calling because he saw you on 23 and was supposed to get a rx for Prilosec as it is cheaper than getting it OTC    Rx Refill Request Telephone Encounter    Name:  Vic Wise  : 1955     Medication Name:  Prilosec  Dose (Optional):    20mg  Quantity (Optional):    #30  Directions (Optional):   Take 1 tablet (20mg) by mouth once daily. Do not crush, chew or split    ALLERGIES:   nkda    Specific Pharmacy location:  [facility]    Date of last appointment:  23  Date of next appointment:  23    Best number to reach patient:  [phone]

## 2023-08-29 ENCOUNTER — OFFICE VISIT (OUTPATIENT)
Dept: PRIMARY CARE | Facility: CLINIC | Age: 68
End: 2023-08-29
Payer: MEDICARE

## 2023-08-29 VITALS
OXYGEN SATURATION: 98 % | SYSTOLIC BLOOD PRESSURE: 120 MMHG | TEMPERATURE: 97.9 F | WEIGHT: 214 LBS | HEART RATE: 70 BPM | RESPIRATION RATE: 18 BRPM | BODY MASS INDEX: 29.96 KG/M2 | HEIGHT: 71 IN | DIASTOLIC BLOOD PRESSURE: 70 MMHG

## 2023-08-29 DIAGNOSIS — M15.9 PRIMARY OSTEOARTHRITIS INVOLVING MULTIPLE JOINTS: ICD-10-CM

## 2023-08-29 DIAGNOSIS — I48.0 PAROXYSMAL ATRIAL FIBRILLATION (MULTI): ICD-10-CM

## 2023-08-29 DIAGNOSIS — I10 HYPERTENSION, UNSPECIFIED TYPE: Primary | ICD-10-CM

## 2023-08-29 DIAGNOSIS — M25.511 ACUTE PAIN OF RIGHT SHOULDER: ICD-10-CM

## 2023-08-29 DIAGNOSIS — N18.31 CHRONIC KIDNEY DISEASE, STAGE 3A (MULTI): ICD-10-CM

## 2023-08-29 DIAGNOSIS — M77.9 TENDONITIS: ICD-10-CM

## 2023-08-29 PROCEDURE — 1160F RVW MEDS BY RX/DR IN RCRD: CPT | Performed by: FAMILY MEDICINE

## 2023-08-29 PROCEDURE — 1036F TOBACCO NON-USER: CPT | Performed by: FAMILY MEDICINE

## 2023-08-29 PROCEDURE — 3078F DIAST BP <80 MM HG: CPT | Performed by: FAMILY MEDICINE

## 2023-08-29 PROCEDURE — 3008F BODY MASS INDEX DOCD: CPT | Performed by: FAMILY MEDICINE

## 2023-08-29 PROCEDURE — 1159F MED LIST DOCD IN RCRD: CPT | Performed by: FAMILY MEDICINE

## 2023-08-29 PROCEDURE — 3074F SYST BP LT 130 MM HG: CPT | Performed by: FAMILY MEDICINE

## 2023-08-29 PROCEDURE — 99213 OFFICE O/P EST LOW 20 MIN: CPT | Performed by: FAMILY MEDICINE

## 2023-08-29 RX ORDER — PREDNISONE 10 MG/1
TABLET ORAL
Qty: 24 TABLET | Refills: 1 | Status: SHIPPED | OUTPATIENT
Start: 2023-08-29 | End: 2023-10-25 | Stop reason: SDUPTHER

## 2023-08-29 NOTE — PROGRESS NOTES
"Subjective   Patient ID: Vic Wise is a 68 y.o. male who presents for Shoulder Pain.  HPI    Patient presents in office today for right shoulder pain. Ongoing x 1.5 weeks - 2 weeks. Denies any pain in his arm. Pain is 4/10. OTC Advil with no relief. Denies any injury. Admits that this happened when he was helping a friend move things. Denies any swelling.     Patient reports taking prednisone for relief.  He reports pain when walking.     ROS  Constitutional- No activity change. No appetite change.  Eyes- Denies vision changes.  Respiratory- No shortness of breath.  Cardiovascular- No palpitations. No chest pain.  GI- No nausea or vomiting. No diarrhea or constipation. Denies abdominal pain.  Musculoskeletal- myalgias   Neurological- Denies headaches. Denies dizziness.  Skin- No rashes.  Psychiatric/Behavioral- Denies significant anxiety, or depressed mood.     Objective     /70   Pulse 70   Temp 36.6 °C (97.9 °F) (Temporal)   Resp 18   Ht 1.803 m (5' 11\")   Wt 97.1 kg (214 lb)   SpO2 98%   BMI 29.85 kg/m²     No Known Allergies    Constitutional-- Well-nourished.  No distress  Eyes- PERRL.  Conjunctiva normal.  Nose- Normal.  No rhinorrhea noted.  Throat- Oropharynx is clear and moist.  Neck- Supple with no thyromegaly.  No significant cervical adenopathy noted.  Pulmonary/Chest- Breath sounds normal with normal effort.  No wheezing.  Heart- Regular rate and rhythm.  No murmur.  Abdomen- Soft and non-tender.  No masses noted.  Musculoskeletal-mild anterior right shoulder pain and right groin pain with range of motion.  OA changes hands noted.  Extremities- No edema.   Neurological- Alert.  No noted deficits.      Assessment/Plan   1. Hypertension, unspecified type        2. Acute pain of right shoulder        3. Paroxysmal atrial fibrillation (CMS/HCC)        4. Primary osteoarthritis involving multiple joints        5. Chronic kidney disease, stage 3a (CMS/HCC)        6. Tendonitis  predniSONE " (Deltasone) 10 mg tablet             Long talk. Treatment options reviewed.    Right shoulder pain discussed.  Take Prednisone as discussed.  Tylenol as needed for any discomfort.  Osteoarthritis controlled. Educated the patient on osteoarthritis care and management. Educated on muscle strength and exercise.  Educated on tendonitis.  Talk about really trying to rest and avoid activities that could exacerbate symptoms.    Hypertension controlled.     Continue and take your medications as prescribed.    Health Maintenance issues discussed.    Importance of healthy diet and regular exercise regimen discussed.      Follow-up as instructed or sooner if any problems or symptoms do not resolve as expected.      Scribe Attestation  By signing my name below, INata Scribe   attest that this documentation has been prepared under the direction and in the presence of William Dasilva MD.

## 2023-09-06 PROBLEM — Z79.899 HIGH RISK MEDICATION USE: Status: ACTIVE | Noted: 2023-09-06

## 2023-09-06 PROBLEM — R10.9 ABDOMINAL PAIN: Status: ACTIVE | Noted: 2023-09-06

## 2023-09-06 PROBLEM — M54.9 CHRONIC BACK PAIN: Status: ACTIVE | Noted: 2023-09-06

## 2023-09-06 PROBLEM — E66.9 CLASS 1 OBESITY WITH BODY MASS INDEX (BMI) OF 30.0 TO 30.9 IN ADULT: Status: ACTIVE | Noted: 2023-09-06

## 2023-09-06 PROBLEM — G62.9 PERIPHERAL NEUROPATHY: Status: ACTIVE | Noted: 2023-09-06

## 2023-09-06 PROBLEM — Z79.01 CHRONIC ANTICOAGULATION: Status: ACTIVE | Noted: 2023-09-06

## 2023-09-06 PROBLEM — E66.811 CLASS 1 OBESITY WITH BODY MASS INDEX (BMI) OF 30.0 TO 30.9 IN ADULT: Status: ACTIVE | Noted: 2023-09-06

## 2023-09-06 PROBLEM — G89.29 CHRONIC BACK PAIN: Status: ACTIVE | Noted: 2023-09-06

## 2023-09-06 PROBLEM — G47.33 OSA (OBSTRUCTIVE SLEEP APNEA): Status: ACTIVE | Noted: 2023-07-17

## 2023-09-06 PROBLEM — I48.91 ATRIAL FIBRILLATION (MULTI): Status: ACTIVE | Noted: 2023-09-06

## 2023-09-06 RX ORDER — PREDNISOLONE ACETATE 10 MG/ML
1 SUSPENSION/ DROPS OPHTHALMIC 4 TIMES DAILY
COMMUNITY
End: 2023-11-15 | Stop reason: ALTCHOICE

## 2023-09-08 ENCOUNTER — TELEPHONE (OUTPATIENT)
Dept: PRIMARY CARE | Facility: CLINIC | Age: 68
End: 2023-09-08
Payer: MEDICARE

## 2023-09-08 DIAGNOSIS — M25.519 SHOULDER PAIN: ICD-10-CM

## 2023-09-08 DIAGNOSIS — M25.559 HIP PAIN: Primary | ICD-10-CM

## 2023-09-08 NOTE — TELEPHONE ENCOUNTER
Called patient and he is aware. He wanted to see Dr Alex Aguilar, his neighbor saw him in the past. Scheduled appointment on schedule me now with Dr Alex Aguilar for Thursday 9/21/23 @ 7:45am at [address] [place] [address] [phone]    He wanted to know what he should do in the meantime. He is having issues with his left hip and he just had a CT scan and he has a hernia on the right side of his groin. He's being pulled both ways and in a lot of pain. Please advise as to what he should do  Thanks

## 2023-09-29 LAB
ALANINE AMINOTRANSFERASE (SGPT) (U/L) IN SER/PLAS: 19 U/L (ref 10–52)
ALBUMIN (G/DL) IN SER/PLAS: 3.9 G/DL (ref 3.4–5)
ALBUMIN: 3.9 G/DL (ref 3.4–5)
ALKALINE PHOSPHATASE (U/L) IN SER/PLAS: 72 U/L (ref 33–136)
ALP BLD-CCNC: 72 U/L (ref 33–136)
ALT SERPL-CCNC: 19 U/L (ref 10–52)
ANION GAP IN SER/PLAS: 10 MMOL/L (ref 10–20)
ANION GAP SERPL CALCULATED.3IONS-SCNC: 10 MMOL/L (ref 10–20)
ASPARTATE AMINOTRANSFERASE (SGOT) (U/L) IN SER/PLAS: 17 U/L (ref 9–39)
AST SERPL-CCNC: 17 U/L (ref 9–39)
BICARBONATE: 27 MMOL/L (ref 21–32)
BILIRUB SERPL-MCNC: 0.5 MG/DL (ref 0–1.2)
BILIRUBIN TOTAL (MG/DL) IN SER/PLAS: 0.5 MG/DL (ref 0–1.2)
CALCIUM (MG/DL) IN SER/PLAS: 9.4 MG/DL (ref 8.6–10.3)
CALCIUM SERPL-MCNC: 9.4 MG/DL (ref 8.6–10.3)
CARBON DIOXIDE, TOTAL (MMOL/L) IN SER/PLAS: 27 MMOL/L (ref 21–32)
CHLORIDE (MMOL/L) IN SER/PLAS: 104 MMOL/L (ref 98–107)
CHLORIDE BLD-SCNC: 104 MMOL/L (ref 98–107)
CHOLESTEROL (MG/DL) IN SER/PLAS: 167 MG/DL (ref 0–199)
CHOLESTEROL IN HDL (MG/DL) IN SER/PLAS: 38 MG/DL
CHOLESTEROL/HDL RATIO: 4.4
CHOLESTEROL/HDL RATIO: 4.4
CHOLESTEROL: 167 MG/DL (ref 0–199)
CREAT SERPL-MCNC: 1.21 MG/DL (ref 0.5–1.3)
CREATININE (MG/DL) IN SER/PLAS: 1.21 MG/DL (ref 0.5–1.3)
EGFR MALE: 64 ML/MIN/1.73M2
GFR MALE: 64 ML/MIN/1.73M2
GLUCOSE (MG/DL) IN SER/PLAS: 171 MG/DL (ref 74–99)
GLUCOSE: 171 MG/DL (ref 74–99)
HDLC SERPL-MCNC: 38 MG/DL
LDL CHOLESTEROL: 94 MG/DL (ref 0–99)
LDL: 94 MG/DL (ref 0–99)
MAGNESIUM (MG/DL) IN SER/PLAS: 1.77 MG/DL (ref 1.6–2.4)
MAGNESIUM: 1.77 MG/DL (ref 1.6–2.4)
POTASSIUM (MMOL/L) IN SER/PLAS: 4.4 MMOL/L (ref 3.5–5.3)
POTASSIUM SERPL-SCNC: 4.4 MMOL/L (ref 3.5–5.3)
PROTEIN TOTAL: 6.9 G/DL (ref 6.4–8.2)
SODIUM (MMOL/L) IN SER/PLAS: 137 MMOL/L (ref 136–145)
SODIUM BLD-SCNC: 137 MMOL/L (ref 136–145)
TOTAL PROTEIN: 6.9 G/DL (ref 6.4–8.2)
TRIGL SERPL-MCNC: 176 MG/DL (ref 0–149)
TRIGLYCERIDE (MG/DL) IN SER/PLAS: 176 MG/DL (ref 0–149)
UREA NITROGEN (MG/DL) IN SER/PLAS: 11 MG/DL (ref 6–23)
UREA NITROGEN: 11 MG/DL (ref 6–23)
VLDL: 35 MG/DL (ref 0–40)
VLDLC SERPL CALC-MCNC: 35 MG/DL (ref 0–40)

## 2023-10-11 ENCOUNTER — APPOINTMENT (OUTPATIENT)
Dept: CARDIOLOGY | Facility: CLINIC | Age: 68
End: 2023-10-11
Payer: MEDICARE

## 2023-10-24 ENCOUNTER — TELEPHONE (OUTPATIENT)
Dept: PRIMARY CARE | Facility: CLINIC | Age: 68
End: 2023-10-24
Payer: MEDICARE

## 2023-10-24 ENCOUNTER — LAB (OUTPATIENT)
Dept: LAB | Facility: LAB | Age: 68
End: 2023-10-24
Payer: MEDICARE

## 2023-10-24 DIAGNOSIS — D64.9 ANEMIA, UNSPECIFIED TYPE: ICD-10-CM

## 2023-10-24 DIAGNOSIS — I10 PRIMARY HYPERTENSION: Primary | ICD-10-CM

## 2023-10-24 DIAGNOSIS — I10 PRIMARY HYPERTENSION: ICD-10-CM

## 2023-10-24 LAB
ANION GAP SERPL CALC-SCNC: 16 MMOL/L (ref 10–20)
BASOPHILS # BLD AUTO: 0.04 X10*3/UL (ref 0–0.1)
BASOPHILS NFR BLD AUTO: 0.7 %
BUN SERPL-MCNC: 13 MG/DL (ref 6–23)
CALCIUM SERPL-MCNC: 9.4 MG/DL (ref 8.6–10.3)
CHLORIDE SERPL-SCNC: 99 MMOL/L (ref 98–107)
CO2 SERPL-SCNC: 25 MMOL/L (ref 21–32)
CREAT SERPL-MCNC: 1.22 MG/DL (ref 0.5–1.3)
EOSINOPHIL # BLD AUTO: 0.13 X10*3/UL (ref 0–0.7)
EOSINOPHIL NFR BLD AUTO: 2.2 %
ERYTHROCYTE [DISTWIDTH] IN BLOOD BY AUTOMATED COUNT: 18.6 % (ref 11.5–14.5)
GFR SERPL CREATININE-BSD FRML MDRD: 64 ML/MIN/1.73M*2
GLUCOSE SERPL-MCNC: 129 MG/DL (ref 74–99)
HCT VFR BLD AUTO: 37.5 % (ref 41–52)
HGB BLD-MCNC: 11.9 G/DL (ref 13.5–17.5)
IMM GRANULOCYTES # BLD AUTO: 0.02 X10*3/UL (ref 0–0.7)
IMM GRANULOCYTES NFR BLD AUTO: 0.3 % (ref 0–0.9)
LYMPHOCYTES # BLD AUTO: 0.66 X10*3/UL (ref 1.2–4.8)
LYMPHOCYTES NFR BLD AUTO: 11.1 %
MCH RBC QN AUTO: 26 PG (ref 26–34)
MCHC RBC AUTO-ENTMCNC: 31.7 G/DL (ref 32–36)
MCV RBC AUTO: 82 FL (ref 80–100)
MONOCYTES # BLD AUTO: 0.51 X10*3/UL (ref 0.1–1)
MONOCYTES NFR BLD AUTO: 8.5 %
NEUTROPHILS # BLD AUTO: 4.61 X10*3/UL (ref 1.2–7.7)
NEUTROPHILS NFR BLD AUTO: 77.2 %
NRBC BLD-RTO: 0 /100 WBCS (ref 0–0)
PLATELET # BLD AUTO: 334 X10*3/UL (ref 150–450)
PMV BLD AUTO: 10 FL (ref 7.5–11.5)
POTASSIUM SERPL-SCNC: 4.4 MMOL/L (ref 3.5–5.3)
RBC # BLD AUTO: 4.57 X10*6/UL (ref 4.5–5.9)
SODIUM SERPL-SCNC: 136 MMOL/L (ref 136–145)
WBC # BLD AUTO: 6 X10*3/UL (ref 4.4–11.3)

## 2023-10-24 PROCEDURE — 85025 COMPLETE CBC W/AUTO DIFF WBC: CPT

## 2023-10-24 PROCEDURE — 36415 COLL VENOUS BLD VENIPUNCTURE: CPT

## 2023-10-24 PROCEDURE — 80048 BASIC METABOLIC PNL TOTAL CA: CPT

## 2023-10-24 NOTE — TELEPHONE ENCOUNTER
"PATIENT IS CALLING - REQUESTING AN APPOINTMENT WITH YOU - HAS HISTORY OF ANEMIA - REPORTS THAT HIS HANDS ARE WHITE, BELIEVES HE IS \"RUNNING OUT OF BLOOD\" - STATES HE MAY NEED A BLOOD TRANSFUSION - PLEASE ADVISE.  "

## 2023-10-25 ENCOUNTER — OFFICE VISIT (OUTPATIENT)
Dept: PRIMARY CARE | Facility: CLINIC | Age: 68
End: 2023-10-25
Payer: MEDICARE

## 2023-10-25 VITALS
OXYGEN SATURATION: 98 % | RESPIRATION RATE: 18 BRPM | TEMPERATURE: 97.9 F | WEIGHT: 206.8 LBS | DIASTOLIC BLOOD PRESSURE: 80 MMHG | HEIGHT: 71 IN | SYSTOLIC BLOOD PRESSURE: 120 MMHG | BODY MASS INDEX: 28.95 KG/M2 | HEART RATE: 75 BPM

## 2023-10-25 DIAGNOSIS — M77.9 TENDONITIS: ICD-10-CM

## 2023-10-25 DIAGNOSIS — K56.609 SMALL BOWEL OBSTRUCTION (MULTI): ICD-10-CM

## 2023-10-25 DIAGNOSIS — E78.00 HYPERCHOLESTEROLEMIA: ICD-10-CM

## 2023-10-25 DIAGNOSIS — M19.90 ARTHRITIS: ICD-10-CM

## 2023-10-25 DIAGNOSIS — I10 HYPERTENSION, UNSPECIFIED TYPE: ICD-10-CM

## 2023-10-25 DIAGNOSIS — R10.9 ABDOMINAL PAIN, UNSPECIFIED ABDOMINAL LOCATION: ICD-10-CM

## 2023-10-25 DIAGNOSIS — N40.0 BENIGN PROSTATIC HYPERPLASIA, UNSPECIFIED WHETHER LOWER URINARY TRACT SYMPTOMS PRESENT: ICD-10-CM

## 2023-10-25 DIAGNOSIS — E61.1 LOW IRON: ICD-10-CM

## 2023-10-25 PROCEDURE — 99214 OFFICE O/P EST MOD 30 MIN: CPT | Performed by: FAMILY MEDICINE

## 2023-10-25 PROCEDURE — 3074F SYST BP LT 130 MM HG: CPT | Performed by: FAMILY MEDICINE

## 2023-10-25 PROCEDURE — 1160F RVW MEDS BY RX/DR IN RCRD: CPT | Performed by: FAMILY MEDICINE

## 2023-10-25 PROCEDURE — 1036F TOBACCO NON-USER: CPT | Performed by: FAMILY MEDICINE

## 2023-10-25 PROCEDURE — 3079F DIAST BP 80-89 MM HG: CPT | Performed by: FAMILY MEDICINE

## 2023-10-25 PROCEDURE — 1159F MED LIST DOCD IN RCRD: CPT | Performed by: FAMILY MEDICINE

## 2023-10-25 PROCEDURE — 3008F BODY MASS INDEX DOCD: CPT | Performed by: FAMILY MEDICINE

## 2023-10-25 RX ORDER — PREDNISONE 10 MG/1
TABLET ORAL
Qty: 24 TABLET | Refills: 1 | Status: SHIPPED
Start: 2023-10-25 | End: 2023-11-15 | Stop reason: ALTCHOICE

## 2023-10-25 NOTE — PROGRESS NOTES
"Subjective   Patient ID: Vic Wise is a 68 y.o. male who presents for Diarrhea, Anemia, and Hernia.  HPI    Patient presents in office today for a hernia in the groin. Ongoing for a while. Admits that he did have a CT scan done on  8/11/23. Would like to go over the results. Admits that this is causing him issues with waking. Admits to pain 8/10 2-3 weeks ago.      Patient presents in office today for anemia. Admits that he has been keeping an eye on his hands. Admits that he did have his BW done yesterday. Would like to talk about this today. Would like to know if he would need an iron infusion.     Patient also presents in office today for bowel issues. Ongoing for a while. Admits that he did have a nasty cold. Admits that he has been having diarrhea since. Ongoing x 3-4 days. Did have a a blockage back in July and went to [facility].    Taking current medications which were reviewed.  Problem list discussed.    Eating okay.  Staying active.  Working on his motorcycles    Has no other new problem /question.     ROS  Constitutional- No activity change. No appetite change.  Eyes- Denies vision changes.  Respiratory- No shortness of breath.  Cardiovascular- No palpitations. No chest pain.  GI- diarrhea   Musculoskeletal- Denies joint swelling.  Extremities- No edema.  Neurological- Denies headaches. Denies dizziness.  Skin- No rashes.  Psychiatric/Behavioral- Denies significant anxiety, or depressed mood.     Objective     /80   Pulse 75   Temp 36.6 °C (97.9 °F) (Temporal)   Resp 18   Ht 1.803 m (5' 11\")   Wt 93.8 kg (206 lb 12.8 oz)   SpO2 98%   BMI 28.84 kg/m²     No Known Allergies    Constitutional-- Well-nourished.  No distress  Head- unremarkable.  Eyes- PERRL.  Conjunctiva normal.  Nose- Normal.  No rhinorrhea noted.  Throat- Oropharynx is clear and moist.  Neck- Supple with no thyromegaly.  No significant cervical adenopathy noted.  Pulmonary/Chest- Breath sounds normal with normal effort.  No " wheezing.  Heart- Regular rate and rhythm.  No murmur.  Abdomen- Soft and non-tender.  No masses noted.  Musculoskeletal- Normal ROM.  No significant joint swelling  Extremities- No edema.   Neurological- Alert.  No noted deficits.  Skin- Warm.  No rashes.  Psychiatric/Behavioral- Mood and affect normal.  Behavior normal.     Assessment/Plan   1. Hypertension, unspecified type        2. Hypercholesterolemia        3. Small bowel obstruction (CMS/HCC)        4. Abdominal pain, unspecified abdominal location        5. Benign prostatic hyperplasia, unspecified whether lower urinary tract symptoms present        6. Low iron        7. Arthritis        8. Tendonitis  predniSONE (Deltasone) 10 mg tablet             Long talk. Treatment options reviewed.  Spent 30 minutes with the patient, with at least 1/2 of the time spent in counseling and instruction.  Reviewed past CAT scan with patient in detail.  Hernias discussed.  Hypertension controlled.      Discussed abdominal pain.  Discussed small bowel obstruction.  Educated on hernia care.  Symptoms consistent with groin strain intermittent    Educated on tendonitis.  Advised patient to continue to monitor symptoms.  Take Prednisone as discussed.  Will use sparingly.    Suspect his hands being slightly wider pale related to Raynaud's which we went over in detail  Continue and take your medications as prescribed.    Health Maintenance issues discussed.    Importance of healthy diet and regular exercise regimen discussed.    We will contact you with any test results ordered. If you do not hear from us, please contact.    Follow-up as instructed or sooner if any problems or symptoms do not resolve as expected.    Scribe Attestation  By signing my name below, Nata VENCES Scribe   attest that this documentation has been prepared under the direction and in the presence of William Dasilva MD.

## 2023-11-07 ENCOUNTER — APPOINTMENT (OUTPATIENT)
Dept: PRIMARY CARE | Facility: CLINIC | Age: 68
End: 2023-11-07
Payer: MEDICARE

## 2023-11-09 ENCOUNTER — LAB (OUTPATIENT)
Dept: LAB | Facility: LAB | Age: 68
End: 2023-11-09
Payer: MEDICARE

## 2023-11-09 DIAGNOSIS — K56.699 OTHER INTESTINAL OBSTRUCTION UNSPECIFIED AS TO PARTIAL VERSUS COMPLETE OBSTRUCTION (MULTI): Primary | ICD-10-CM

## 2023-11-09 DIAGNOSIS — R10.10 UPPER ABDOMINAL PAIN, UNSPECIFIED: ICD-10-CM

## 2023-11-09 LAB
ALBUMIN SERPL BCP-MCNC: 4 G/DL (ref 3.4–5)
ALP SERPL-CCNC: 76 U/L (ref 33–136)
ALT SERPL W P-5'-P-CCNC: 14 U/L (ref 10–52)
ANION GAP SERPL CALC-SCNC: 13 MMOL/L (ref 10–20)
APTT PPP: 38 SECONDS (ref 27–38)
AST SERPL W P-5'-P-CCNC: 14 U/L (ref 9–39)
BASOPHILS # BLD AUTO: 0.04 X10*3/UL (ref 0–0.1)
BASOPHILS NFR BLD AUTO: 0.6 %
BILIRUB SERPL-MCNC: 0.7 MG/DL (ref 0–1.2)
BUN SERPL-MCNC: 20 MG/DL (ref 6–23)
CALCIUM SERPL-MCNC: 9.3 MG/DL (ref 8.6–10.3)
CHLORIDE SERPL-SCNC: 99 MMOL/L (ref 98–107)
CO2 SERPL-SCNC: 27 MMOL/L (ref 21–32)
CREAT SERPL-MCNC: 1.35 MG/DL (ref 0.5–1.3)
CRP SERPL-MCNC: 2.77 MG/DL
EOSINOPHIL # BLD AUTO: 0.1 X10*3/UL (ref 0–0.7)
EOSINOPHIL NFR BLD AUTO: 1.4 %
ERYTHROCYTE [DISTWIDTH] IN BLOOD BY AUTOMATED COUNT: 17.6 % (ref 11.5–14.5)
GFR SERPL CREATININE-BSD FRML MDRD: 56 ML/MIN/1.73M*2
GLUCOSE SERPL-MCNC: 105 MG/DL (ref 74–99)
HCT VFR BLD AUTO: 42.4 % (ref 41–52)
HGB BLD-MCNC: 13.5 G/DL (ref 13.5–17.5)
IMM GRANULOCYTES # BLD AUTO: 0.02 X10*3/UL (ref 0–0.7)
IMM GRANULOCYTES NFR BLD AUTO: 0.3 % (ref 0–0.9)
INR PPP: 1.5 (ref 0.9–1.1)
LYMPHOCYTES # BLD AUTO: 0.68 X10*3/UL (ref 1.2–4.8)
LYMPHOCYTES NFR BLD AUTO: 9.5 %
MCH RBC QN AUTO: 26.3 PG (ref 26–34)
MCHC RBC AUTO-ENTMCNC: 31.8 G/DL (ref 32–36)
MCV RBC AUTO: 83 FL (ref 80–100)
MONOCYTES # BLD AUTO: 0.71 X10*3/UL (ref 0.1–1)
MONOCYTES NFR BLD AUTO: 9.9 %
NEUTROPHILS # BLD AUTO: 5.59 X10*3/UL (ref 1.2–7.7)
NEUTROPHILS NFR BLD AUTO: 78.3 %
NRBC BLD-RTO: 0 /100 WBCS (ref 0–0)
PLATELET # BLD AUTO: 330 X10*3/UL (ref 150–450)
POTASSIUM SERPL-SCNC: 4.3 MMOL/L (ref 3.5–5.3)
PROT SERPL-MCNC: 7.1 G/DL (ref 6.4–8.2)
PROTHROMBIN TIME: 17.3 SECONDS (ref 9.8–12.8)
RBC # BLD AUTO: 5.13 X10*6/UL (ref 4.5–5.9)
SODIUM SERPL-SCNC: 135 MMOL/L (ref 136–145)
WBC # BLD AUTO: 7.1 X10*3/UL (ref 4.4–11.3)

## 2023-11-09 PROCEDURE — 86140 C-REACTIVE PROTEIN: CPT

## 2023-11-09 PROCEDURE — 85730 THROMBOPLASTIN TIME PARTIAL: CPT

## 2023-11-09 PROCEDURE — 36415 COLL VENOUS BLD VENIPUNCTURE: CPT

## 2023-11-09 PROCEDURE — 80053 COMPREHEN METABOLIC PANEL: CPT

## 2023-11-09 PROCEDURE — 85025 COMPLETE CBC W/AUTO DIFF WBC: CPT

## 2023-11-09 PROCEDURE — 85610 PROTHROMBIN TIME: CPT

## 2023-11-10 ENCOUNTER — OFFICE VISIT (OUTPATIENT)
Dept: SURGERY | Facility: CLINIC | Age: 68
End: 2023-11-10
Payer: MEDICARE

## 2023-11-10 ENCOUNTER — HOSPITAL ENCOUNTER (OUTPATIENT)
Dept: RADIOLOGY | Facility: HOSPITAL | Age: 68
Discharge: HOME | End: 2023-11-10
Payer: MEDICARE

## 2023-11-10 VITALS
SYSTOLIC BLOOD PRESSURE: 112 MMHG | BODY MASS INDEX: 28.28 KG/M2 | TEMPERATURE: 98.3 F | WEIGHT: 202 LBS | HEART RATE: 82 BPM | HEIGHT: 71 IN | DIASTOLIC BLOOD PRESSURE: 73 MMHG

## 2023-11-10 DIAGNOSIS — K56.699 OTHER INTESTINAL OBSTRUCTION UNSPECIFIED AS TO PARTIAL VERSUS COMPLETE OBSTRUCTION (MULTI): ICD-10-CM

## 2023-11-10 DIAGNOSIS — K56.50 SMALL BOWEL OBSTRUCTION DUE TO ADHESIONS (MULTI): Primary | ICD-10-CM

## 2023-11-10 DIAGNOSIS — R10.10 UPPER ABDOMINAL PAIN, UNSPECIFIED: ICD-10-CM

## 2023-11-10 PROCEDURE — 2500000001 HC RX 250 WO HCPCS SELF ADMINISTERED DRUGS (ALT 637 FOR MEDICARE OP): Performed by: INTERNAL MEDICINE

## 2023-11-10 PROCEDURE — 1036F TOBACCO NON-USER: CPT | Performed by: COLON & RECTAL SURGERY

## 2023-11-10 PROCEDURE — 3074F SYST BP LT 130 MM HG: CPT | Performed by: COLON & RECTAL SURGERY

## 2023-11-10 PROCEDURE — 1160F RVW MEDS BY RX/DR IN RCRD: CPT | Performed by: COLON & RECTAL SURGERY

## 2023-11-10 PROCEDURE — 3008F BODY MASS INDEX DOCD: CPT | Performed by: COLON & RECTAL SURGERY

## 2023-11-10 PROCEDURE — 99205 OFFICE O/P NEW HI 60 MIN: CPT | Performed by: COLON & RECTAL SURGERY

## 2023-11-10 PROCEDURE — 74250 X-RAY XM SM INT 1CNTRST STD: CPT | Performed by: RADIOLOGY

## 2023-11-10 PROCEDURE — 3078F DIAST BP <80 MM HG: CPT | Performed by: COLON & RECTAL SURGERY

## 2023-11-10 PROCEDURE — 1159F MED LIST DOCD IN RCRD: CPT | Performed by: COLON & RECTAL SURGERY

## 2023-11-10 PROCEDURE — 74250 X-RAY XM SM INT 1CNTRST STD: CPT

## 2023-11-10 PROCEDURE — 99215 OFFICE O/P EST HI 40 MIN: CPT | Performed by: COLON & RECTAL SURGERY

## 2023-11-10 RX ORDER — GABAPENTIN 100 MG/1
100 CAPSULE ORAL NIGHTLY
Qty: 3 CAPSULE | Refills: 0 | Status: SHIPPED
Start: 2023-11-10 | End: 2023-12-17 | Stop reason: HOSPADM

## 2023-11-10 RX ORDER — METRONIDAZOLE 500 MG/100ML
500 INJECTION, SOLUTION INTRAVENOUS ONCE
Status: CANCELLED | OUTPATIENT
Start: 2023-11-10 | End: 2023-11-10

## 2023-11-10 RX ORDER — HEPARIN SODIUM 5000 [USP'U]/ML
5000 INJECTION, SOLUTION INTRAVENOUS; SUBCUTANEOUS ONCE
Status: CANCELLED | OUTPATIENT
Start: 2023-11-10 | End: 2023-11-10

## 2023-11-10 RX ORDER — NEOMYCIN SULFATE 500 MG/1
1000 TABLET ORAL SEE ADMIN INSTRUCTIONS
Qty: 6 TABLET | Refills: 0 | Status: SHIPPED
Start: 2023-11-10 | End: 2023-11-15 | Stop reason: ALTCHOICE

## 2023-11-10 RX ORDER — ACETAMINOPHEN 500 MG
1000 TABLET ORAL ONCE
Status: CANCELLED | OUTPATIENT
Start: 2023-11-10 | End: 2023-11-10

## 2023-11-10 RX ORDER — METRONIDAZOLE 500 MG/1
500 TABLET ORAL SEE ADMIN INSTRUCTIONS
Qty: 3 TABLET | Refills: 0 | Status: SHIPPED
Start: 2023-11-10 | End: 2023-11-15 | Stop reason: ALTCHOICE

## 2023-11-10 RX ORDER — POLYETHYLENE GLYCOL 3350 17 G/17G
255 POWDER, FOR SOLUTION ORAL SEE ADMIN INSTRUCTIONS
Qty: 15 PACKET | Refills: 0 | Status: SHIPPED
Start: 2023-11-10 | End: 2023-11-15 | Stop reason: ALTCHOICE

## 2023-11-10 RX ORDER — BISACODYL 5 MG
5 TABLET, DELAYED RELEASE (ENTERIC COATED) ORAL SEE ADMIN INSTRUCTIONS
Qty: 3 TABLET | Refills: 0 | Status: SHIPPED
Start: 2023-11-10 | End: 2023-11-15 | Stop reason: ALTCHOICE

## 2023-11-10 RX ADMIN — BARIUM SULFATE 200 ML: 960 POWDER, FOR SUSPENSION ORAL at 08:48

## 2023-11-10 ASSESSMENT — ENCOUNTER SYMPTOMS
POLYDIPSIA: 0
HEMATURIA: 0
FEVER: 0
POLYPHAGIA: 0
RESPIRATORY NEGATIVE: 1
NEUROLOGICAL NEGATIVE: 1
UNEXPECTED WEIGHT CHANGE: 1
ROS GI COMMENTS: AS NOTED IN HPI
APPETITE CHANGE: 1
CHILLS: 0
HEMATOLOGIC/LYMPHATIC NEGATIVE: 1
PALPITATIONS: 0
PSYCHIATRIC NEGATIVE: 1
BACK PAIN: 1
DIFFICULTY URINATING: 0
DYSURIA: 0

## 2023-11-10 NOTE — PROGRESS NOTES
Chief complaint:  Obstructive symptoms    History of present illness:  Vic Wise is a 68M with remote SBR about 14 years ago (9/28/10 Pathology: Partial excision of ileum-active enteritis with two ulcerations, clinically strictures-one reactive lymph node). Did pretty well until about 1 year ago when he started having obstructive type of symptoms which has gotten progressively worse. Had PBJ sandwich on Saturday had severe left sided pain. Finally had a BM Sunday night. HE felt better, not great but better. Pain has continued but he is mainly staying on liquids to full liquids.    Episodes of vomiting.  Lost 18 lbs the last month.  Patient denies any recent travel, no sick contacts.  Denies any history of vascular problems.  He is able to walk significant distances without any evidence of claudication.  He has no episodes of fainting or vision issues to suggest TIAs...    +Cologuard December. Had colonoscopy with Dr. Angeles in January.    7/20/23 CT A/P  IMPRESSION:  1.  Mild dilatation of central loops of small bowel without  transition point or additional findings to indicate obstruction. This  could be related to developing ileus.  2. Trace pelvic free fluid.    8/11/23  IMPRESSION:  1. There is no evidence of bowel obstruction. No suspicious small  bowel lesions identified.  2. Postsurgical changes of small-bowel resection unchanged luminal  narrowing at the anastomosis. Otherwise, the bowel loops are normal  in course and caliber.    11/10/23 Small bowel series   No evidence of bowel obstruction.       PMH: AFIb (Eliquis), HTN, DLD  PSH: SBR, Back surgery x2   Tobacco use: Never  Alcohol use: None  Recreation drugs: None    Review of Systems   Constitutional:  Positive for appetite change and unexpected weight change. Negative for chills and fever.   Respiratory: Negative.     Cardiovascular:  Negative for chest pain, palpitations and leg swelling.        H/o Afib, HTN   Gastrointestinal:         As noted in  HPI   Endocrine: Negative for cold intolerance, heat intolerance, polydipsia, polyphagia and polyuria.   Genitourinary:  Negative for difficulty urinating, dysuria and hematuria.   Musculoskeletal:  Positive for back pain.   Neurological: Negative.    Hematological: Negative.    Psychiatric/Behavioral: Negative.          Physical Exam  Constitutional:       Appearance: Normal appearance.   HENT:      Head: Normocephalic.   Neck:      Vascular: No carotid bruit.   Cardiovascular:      Rate and Rhythm: Normal rate and regular rhythm.      Pulses: Normal pulses.      Heart sounds: Normal heart sounds.   Pulmonary:      Effort: Pulmonary effort is normal.      Breath sounds: Normal breath sounds.   Abdominal:      Comments: Soft, ND. LLQ tenderness   Musculoskeletal:      Cervical back: Neck supple. No tenderness.      Right lower leg: No edema.      Left lower leg: No edema.   Lymphadenopathy:      Cervical: No cervical adenopathy.   Neurological:      General: No focal deficit present.      Mental Status: He is alert and oriented to person, place, and time.   Psychiatric:         Mood and Affect: Mood normal.         Behavior: Behavior normal.          Plan/Assessment:  68-year-old male with a past medical history of the small bowel resection now presenting with paroxysmal episodes of obstruction clinically albeit with normal radiographic findings.  Patient and I had a very long discussion regarding surgical intervention for this.  He has a clear understanding that there might be a scenario in which he undergoes an operation with lysis of adhesions and even redo of his anastomosis can still end up with this problem being unsolved.  The fact that there are no radiographic signs that clearly indicate an obstructive process is somewhat concerning however his clinical symptoms are very consistent with an obstructive picture and as such I think it is reasonable given his weight loss to consider surgical intervention with  lysis of adhesions and redo of his anastomosis.  We discussed the risks of the operation. Patient is willing to proceed despite this degree of uncertainty.

## 2023-11-14 DIAGNOSIS — I10 HYPERTENSION, UNSPECIFIED TYPE: ICD-10-CM

## 2023-11-15 ENCOUNTER — OFFICE VISIT (OUTPATIENT)
Dept: CARDIOLOGY | Facility: CLINIC | Age: 68
End: 2023-11-15
Payer: MEDICARE

## 2023-11-15 VITALS
WEIGHT: 199 LBS | HEIGHT: 71 IN | HEART RATE: 87 BPM | SYSTOLIC BLOOD PRESSURE: 114 MMHG | BODY MASS INDEX: 27.86 KG/M2 | DIASTOLIC BLOOD PRESSURE: 70 MMHG

## 2023-11-15 DIAGNOSIS — I48.0 PAROXYSMAL ATRIAL FIBRILLATION (MULTI): Primary | ICD-10-CM

## 2023-11-15 DIAGNOSIS — E78.00 HYPERCHOLESTEROLEMIA: ICD-10-CM

## 2023-11-15 DIAGNOSIS — I34.0 NONRHEUMATIC MITRAL VALVE REGURGITATION: ICD-10-CM

## 2023-11-15 DIAGNOSIS — I10 PRIMARY HYPERTENSION: ICD-10-CM

## 2023-11-15 DIAGNOSIS — I49.5 SINUS NODE DYSFUNCTION (MULTI): ICD-10-CM

## 2023-11-15 PROCEDURE — 1159F MED LIST DOCD IN RCRD: CPT | Performed by: INTERNAL MEDICINE

## 2023-11-15 PROCEDURE — 93000 ELECTROCARDIOGRAM COMPLETE: CPT | Performed by: INTERNAL MEDICINE

## 2023-11-15 PROCEDURE — 99214 OFFICE O/P EST MOD 30 MIN: CPT | Performed by: INTERNAL MEDICINE

## 2023-11-15 PROCEDURE — 1036F TOBACCO NON-USER: CPT | Performed by: INTERNAL MEDICINE

## 2023-11-15 PROCEDURE — 1160F RVW MEDS BY RX/DR IN RCRD: CPT | Performed by: INTERNAL MEDICINE

## 2023-11-15 PROCEDURE — 3008F BODY MASS INDEX DOCD: CPT | Performed by: INTERNAL MEDICINE

## 2023-11-15 PROCEDURE — 3074F SYST BP LT 130 MM HG: CPT | Performed by: INTERNAL MEDICINE

## 2023-11-15 PROCEDURE — 3078F DIAST BP <80 MM HG: CPT | Performed by: INTERNAL MEDICINE

## 2023-11-15 RX ORDER — LISINOPRIL 20 MG/1
20 TABLET ORAL DAILY
Qty: 90 TABLET | Refills: 1 | Status: SHIPPED | OUTPATIENT
Start: 2023-11-15 | End: 2024-01-29 | Stop reason: SDUPTHER

## 2023-11-15 NOTE — PROGRESS NOTES
Patient:  Vic Wise  YOB: 1955  MRN: [id number]       HPI:       Vic Wise is a 68 y.o. male who returns today for cardiac follow-up.   Vic Wise returns today for cardiac follow-up. He was seen on December 28, 2022 for newly diagnosed atrial fibrillation. He does not have any history of atherosclerotic heart or valvular heart disease. He was diagnosed with hypertension more than 20 years ago. His blood pressures have been well controlled with lisinopril. He has hyperlipidemia for which he takes simvastatin. No history of diabetes mellitus. He has never smoked. He has a history of some iron deficiency anemia related to hemorrhoids.    He was noted during a routine office visit on December 7, 2022 with Dr. Dasilva to have atrial fibrillation with a heart rate of 78 bpm. No significant ST changes. He had been in the office 6 months prior to that and had a regular rhythm. He was essentially asymptomatic. He noted a very brief episode of palpitations on [place]. He was started on Eliquis 5 mg twice daily. His heart rates were controlled without any negative chronotropic agents suggesting an element of underlying conduction disease.     An echocardiogram on January 25, 2023 showed normal LV systolic function. Estimated LV ejection fraction of 60 to 65%. There was mild RV enlargement with normal RV systolic function. Mild left atrial enlargement. 2+ mitral regurgitation and 1+ tricuspid regurgitation. Estimated RVSP 44 mmHg. Very similar to previous study in 2015. An exercise treadmill test on January 25, 2020 was negative for significant ST changes. He achieved 4.6 METS and 104% of maximally predicted heart rate. Resting heart rate was 99 bpm with rapid increase to 139 bpm. A 24-hour Holter monitor on January 24, 2023 showed atrial fibrillation throughout with an average heart rate 79 bpm. Heart rates ranged from 36 bpm in the early morning hours to a maximal heart rate of 141 beats minute. He had  186 pauses of greater than 2 seconds. The overwhelming majority occurred during sleeping hours. Longest pause was 3.1 seconds. Lab studies dated December 28, 2022 showed a normal CBC and CMP except for glucose 129. TSH was normal. Magnesium level was 1.74.     He underwent external electrocardioversion to normal sinus rhythm by Dr. Abraham on February 24, 2023. He was noted on a follow-up visit with Dr. Abraham on March 14, 2023 to be back in atrial fibrillation. Heart rate 61 bpm. Dr. Abraham recommended admission for drug loading with dofetilide due to underlying sinus node dysfunction. He was admitted April 4, 2023 and started on dofetilide to 500 mcg twice daily. In the early morning hours of April 5, 2023 he converted to normal sinus rhythm. He was noted to have low magnesium levels and was give IV Mg Sulfate and was started on Mg Oxide.     He has been doing well from a cardiac standpoint since his last visit.  He relates that he had a partial small bowel resection approximately 14 years ago.  He recently developed progressively worsening abdominal discomfort.  He was diagnosed with partial small bowel obstruction secondary to adhesions.  He is going to undergo surgical repair in the near future.  He denies any chest pain or shortness of breath. He denies any orthopnea, PND, or increasing peripheral edema. He denies any lightheadedness, near-syncope, or syncope. He denies any fever, chillekgs, or cough. He denies any nausea, vomiting, or diaphoresis. He denies any hemoptysis, hematemesis, melena, or hematochezia. His EKG in the office today shows NSR with first-degree AV block.  Corrected QT interval 435 ms.  Lab studies dated November 9, 2023 showed a normal comprehensive metabolic profile with exception of sodium 135, creatinine 1.35, and glucose 105.  Cholesterol 167 with triglycerides 176, HDL 38, and LDL 94.  TSH was normal.  CBC was normal.      He is free of any anginal or CHF symptoms.  He remains in normal  sinus rhythm.  His blood pressures are well controlled.  Exercise capacity is in excess of 4 METS.  Overall he is reasonably low risk to proceed with surgery as planned.  He will hold Eliquis 2 days prior to the surgery.  Other details as noted below.    The above portion of this note was dictated by me using voice recognition software. I personally performed the services described in the documentation. The scribe entering the documentation below was in my presence. I affirm that the information is both accurate and complete.       Objective:     Vitals:    11/15/23 1303   BP: 114/70   Pulse: 87       Wt Readings from Last 4 Encounters:   11/15/23 90.3 kg (199 lb)   11/10/23 91.6 kg (202 lb)   10/25/23 93.8 kg (206 lb 12.8 oz)   08/29/23 97.1 kg (214 lb)       Allergies:     No Known Allergies     Medications:     Current Outpatient Medications   Medication Instructions    apixaban (ELIQUIS) 5 mg, oral, 2 times daily    dofetilide (TIKOSYN) 250 mcg, oral, 2 times daily    gabapentin (NEURONTIN) 100 mg, oral, Nightly, Take one capsule nightly beginning 3 days before surgery.    lisinopril 20 mg, oral, Daily    magnesium oxide (Mag-Ox) 400 mg (241.3 mg magnesium) tablet 1 tablet, oral, Daily    omeprazole OTC (PRILOSEC OTC) 20 mg, oral, Daily, Do not crush, chew, or split.     simvastatin (ZOCOR) 40 mg, oral, Nightly       Physical Examination:   GENERAL:  Well developed, well nourished, in no acute distress.  CHEST:  Symmetric and nontender.  NEURO/PSYCH:  Alert and oriented times three with approppriate behavior and responses.  NECK:  Supple, no JVD, no bruit.  LUNGS:  Clear to auscultation bilaterally, normal respiratory effort.  HEART:  Rate and rhythm regular with no evident murmur, no gallop appreciated.        There are no rubs, clicks or heaves.  EXTREMITIES:  Warm with good color, no clubbing or cyanosis.  There is no edema noted.  PERIPHERAL VASCULAR:  Pulses present and equally palpable; 2+  throughout.      Lab:     CBC:   Lab Results   Component Value Date    WBC 7.1 11/09/2023    RBC 5.13 11/09/2023    HGB 13.5 11/09/2023    HCT 42.4 11/09/2023     11/09/2023        CMP:    Lab Results   Component Value Date     (L) 11/09/2023    K 4.3 11/09/2023    CL 99 11/09/2023    CO2 27 11/09/2023    BUN 20 11/09/2023    CREATININE 1.35 (H) 11/09/2023    GLUCOSE 105 (H) 11/09/2023    CALCIUM 9.3 11/09/2023       Magnesium:    Lab Results   Component Value Date    MG 1.77 09/29/2023       Lipid Profile:    Lab Results   Component Value Date    TRIG 176 (H) 09/29/2023    HDL 38.0 (A) 09/29/2023       TSH:    Lab Results   Component Value Date    TSH 1.21 04/04/2023       PT/INR:    Lab Results   Component Value Date    PROTIME 17.3 (H) 11/09/2023    INR 1.5 (H) 11/09/2023       BMP:  Lab Results   Component Value Date     (L) 11/09/2023     10/24/2023     09/29/2023     07/28/2023     07/20/2023    K 4.3 11/09/2023    K 4.4 10/24/2023    K 4.4 09/29/2023    K 4.5 07/28/2023    K 4.5 07/20/2023    CL 99 11/09/2023    CL 99 10/24/2023     09/29/2023     07/28/2023     07/20/2023    CO2 27 11/09/2023    CO2 25 10/24/2023    CO2 27 09/29/2023    CO2 27 07/28/2023    CO2 27 07/20/2023    BUN 20 11/09/2023    BUN 13 10/24/2023    BUN 11 09/29/2023    BUN 11 07/28/2023    BUN 14 07/20/2023    CREATININE 1.35 (H) 11/09/2023    CREATININE 1.22 10/24/2023    CREATININE 1.21 09/29/2023    CREATININE 1.23 07/28/2023    CREATININE 1.29 07/20/2023       CBC:  Lab Results   Component Value Date    WBC 7.1 11/09/2023    WBC 6.0 10/24/2023    WBC 5.3 07/28/2023    WBC 11.6 (H) 07/20/2023    WBC 5.1 04/04/2023    RBC 5.13 11/09/2023    RBC 4.57 10/24/2023    RBC 4.58 07/28/2023    RBC 4.77 07/20/2023    RBC 4.38 (L) 04/04/2023    HGB 13.5 11/09/2023    HGB 11.9 (L) 10/24/2023    HGB 11.5 (L) 07/28/2023    HGB 12.0 (L) 07/20/2023    HGB 11.8 (L) 04/04/2023    HCT 42.4  11/09/2023    HCT 37.5 (L) 10/24/2023    HCT 37.5 (L) 07/28/2023    HCT 38.6 (L) 07/20/2023    HCT 36.1 (L) 04/04/2023    MCV 83 11/09/2023    MCV 82 10/24/2023    MCV 82 07/28/2023    MCV 81 07/20/2023    MCV 82 04/04/2023    MCH 26.3 11/09/2023    MCH 26.0 10/24/2023    MCHC 31.8 (L) 11/09/2023    MCHC 31.7 (L) 10/24/2023    MCHC 30.7 (L) 07/28/2023    MCHC 31.1 (L) 07/20/2023    MCHC 32.7 04/04/2023    RDW 17.6 (H) 11/09/2023    RDW 18.6 (H) 10/24/2023    RDW 15.6 (H) 07/28/2023    RDW 15.0 (H) 07/20/2023    RDW 17.4 (H) 04/04/2023     11/09/2023     10/24/2023     07/28/2023     07/20/2023     04/04/2023    MPV 10.0 10/24/2023       Cardiac Enzymes:    Lab Results   Component Value Date    TROPHS 6 07/20/2023    TROPHS 7 07/20/2023       Hepatic Function Panel:    Lab Results   Component Value Date    ALKPHOS 76 11/09/2023    ALT 14 11/09/2023    AST 14 11/09/2023    PROT 7.1 11/09/2023    BILITOT 0.7 11/09/2023       Diagnostic Studies:     FL small bowel series  Result Date: 11/10/2023    No evidence of bowel obstruction.     MACRO: None   Signed by: Yuval Pittman 11/10/2023 11:05 AM Dictation workstation:   [id number]      Problem List:     Patient Active Problem List   Diagnosis    Abnormal EKG    Arthritis    BPH (benign prostatic hyperplasia)    Chronic kidney disease, stage 3a (CMS/HCC)    Eczema    Fatigue    HTN (hypertension)    Hypercholesterolemia    Irregular heart beat    Low iron    Lower back pain    Nonrheumatic mitral valve regurgitation    Paroxysmal atrial fibrillation (CMS/HCC)    Positive colorectal cancer screening using Cologuard test    Primary osteoarthritis involving multiple joints    Psoriasis    Red blood cell abnormality    Sinus node dysfunction (CMS/HCC)    Snoring    Abdominal pain    Abnormal echocardiogram    Coronary artery calcification seen on CAT scan    SERGIO (obstructive sleep apnea)    Peripheral neuropathy    Small bowel  obstruction (CMS/HCC)    Essential hypertension    Hyperlipidemia    Chronic back pain    Atrial fibrillation (CMS/HCC)    Chronic anticoagulation    Class 1 obesity with body mass index (BMI) of 30.0 to 30.9 in adult    High risk medication use    Small bowel obstruction due to adhesions (CMS/HCC)       Asessment:     Problem List Items Addressed This Visit             ICD-10-CM    HTN (hypertension) I10    Relevant Orders    Follow Up In Cardiology    [facility]    Comprehensive metabolic panel    Lipid panel    Hypercholesterolemia E78.00    Relevant Orders    Follow Up In Cardiology    [facility]    Comprehensive metabolic panel    Lipid panel    Nonrheumatic mitral valve regurgitation I34.0    Relevant Orders    Follow Up In Cardiology    [facility]    Comprehensive metabolic panel    Lipid panel    Paroxysmal atrial fibrillation (CMS/HCC) - Primary I48.0    Relevant Orders    Follow Up In Cardiology    [facility]    Comprehensive metabolic panel    Lipid panel    Sinus node dysfunction (CMS/HCC) I49.5    Relevant Orders    Follow Up In Cardiology    [facility]    Comprehensive metabolic panel    Lipid panel

## 2023-11-28 ENCOUNTER — PRE-ADMISSION TESTING (OUTPATIENT)
Dept: PREADMISSION TESTING | Facility: HOSPITAL | Age: 68
End: 2023-11-28
Payer: MEDICARE

## 2023-11-28 VITALS
DIASTOLIC BLOOD PRESSURE: 74 MMHG | HEART RATE: 88 BPM | SYSTOLIC BLOOD PRESSURE: 144 MMHG | WEIGHT: 199.52 LBS | BODY MASS INDEX: 27.93 KG/M2 | OXYGEN SATURATION: 97 % | HEIGHT: 71 IN | TEMPERATURE: 98 F

## 2023-11-28 DIAGNOSIS — R79.1 ABNORMAL COAGULATION PROFILE: ICD-10-CM

## 2023-11-28 DIAGNOSIS — Z01.818 PREOPERATIVE TESTING: Primary | ICD-10-CM

## 2023-11-28 DIAGNOSIS — R94.5 ABNORMAL RESULTS OF LIVER FUNCTION STUDIES: ICD-10-CM

## 2023-11-28 LAB
ABO GROUP (TYPE) IN BLOOD: NORMAL
ANION GAP SERPL CALC-SCNC: 14 MMOL/L (ref 10–20)
ANTIBODY SCREEN: NORMAL
APTT PPP: 34 SECONDS (ref 27–38)
BUN SERPL-MCNC: 12 MG/DL (ref 6–23)
CALCIUM SERPL-MCNC: 9.4 MG/DL (ref 8.6–10.6)
CHLORIDE SERPL-SCNC: 107 MMOL/L (ref 98–107)
CO2 SERPL-SCNC: 26 MMOL/L (ref 21–32)
CREAT SERPL-MCNC: 1.04 MG/DL (ref 0.5–1.3)
ERYTHROCYTE [DISTWIDTH] IN BLOOD BY AUTOMATED COUNT: 16.5 % (ref 11.5–14.5)
GFR SERPL CREATININE-BSD FRML MDRD: 77 ML/MIN/1.73M*2
GLUCOSE SERPL-MCNC: 106 MG/DL (ref 74–99)
HCT VFR BLD AUTO: 38.1 % (ref 41–52)
HGB BLD-MCNC: 12 G/DL (ref 13.5–17.5)
INR PPP: 1.1 (ref 0.9–1.1)
MCH RBC QN AUTO: 27.1 PG (ref 26–34)
MCHC RBC AUTO-ENTMCNC: 31.5 G/DL (ref 32–36)
MCV RBC AUTO: 86 FL (ref 80–100)
NRBC BLD-RTO: 0 /100 WBCS (ref 0–0)
PLATELET # BLD AUTO: 366 X10*3/UL (ref 150–450)
POTASSIUM SERPL-SCNC: 5.1 MMOL/L (ref 3.5–5.3)
PROTHROMBIN TIME: 12.9 SECONDS (ref 9.8–12.8)
RBC # BLD AUTO: 4.42 X10*6/UL (ref 4.5–5.9)
RH FACTOR (ANTIGEN D): NORMAL
SODIUM SERPL-SCNC: 142 MMOL/L (ref 136–145)
WBC # BLD AUTO: 4.4 X10*3/UL (ref 4.4–11.3)

## 2023-11-28 PROCEDURE — 86850 RBC ANTIBODY SCREEN: CPT

## 2023-11-28 PROCEDURE — 85610 PROTHROMBIN TIME: CPT

## 2023-11-28 PROCEDURE — 80048 BASIC METABOLIC PNL TOTAL CA: CPT

## 2023-11-28 PROCEDURE — 85027 COMPLETE CBC AUTOMATED: CPT

## 2023-11-28 PROCEDURE — 87081 CULTURE SCREEN ONLY: CPT

## 2023-11-28 PROCEDURE — 99205 OFFICE O/P NEW HI 60 MIN: CPT | Performed by: NURSE PRACTITIONER

## 2023-11-28 PROCEDURE — 36415 COLL VENOUS BLD VENIPUNCTURE: CPT

## 2023-11-28 RX ORDER — CHLORHEXIDINE GLUCONATE 40 MG/ML
SOLUTION TOPICAL
Qty: 473 ML | Refills: 0 | Status: SHIPPED
Start: 2023-11-28 | End: 2024-01-05 | Stop reason: ALTCHOICE

## 2023-11-28 ASSESSMENT — DUKE ACTIVITY SCORE INDEX (DASI)
CAN YOU CLIMB A FLIGHT OF STAIRS OR WALK UP A HILL: YES
CAN YOU HAVE SEXUAL RELATIONS: YES
CAN YOU DO YARD WORK LIKE RAKING LEAVES, WEEDING OR PUSHING A MOWER: YES
DASI METS SCORE: 9.9
CAN YOU WALK INDOORS, SUCH AS AROUND YOUR HOUSE: YES
CAN YOU PARTICIPATE IN STRENOUS SPORTS LIKE SWIMMING, SINGLES TENNIS, FOOTBALL, BASKETBALL, OR SKIING: YES
CAN YOU WALK A BLOCK OR TWO ON LEVEL GROUND: YES
CAN YOU DO LIGHT WORK AROUND THE HOUSE LIKE DUSTING OR WASHING DISHES: YES
CAN YOU TAKE CARE OF YOURSELF (EAT, DRESS, BATHE, OR USE TOILET): YES
CAN YOU DO HEAVY WORK AROUND THE HOUSE LIKE SCRUBBING FLOORS OR LIFTING AND MOVING HEAVY FURNITURE: YES
CAN YOU PARTICIPATE IN MODERATE RECREATIONAL ACTIVITIES LIKE GOLF, BOWLING, DANCING, DOUBLES TENNIS OR THROWING A BASEBALL OR FOOTBALL: YES
CAN YOU DO MODERATE WORK AROUND THE HOUSE LIKE VACUUMING, SWEEPING FLOORS OR CARRYING GROCERIES: YES
TOTAL_SCORE: 58.2
CAN YOU RUN A SHORT DISTANCE: YES

## 2023-11-28 ASSESSMENT — ENCOUNTER SYMPTOMS
NEUROLOGICAL NEGATIVE: 1
VOMITING: 1
ABDOMINAL PAIN: 1
RESPIRATORY NEGATIVE: 1
ENDOCRINE NEGATIVE: 1
CONSTITUTIONAL NEGATIVE: 1
NAUSEA: 1
ROS GI COMMENTS: INTERMITTENT
CARDIOVASCULAR NEGATIVE: 1
MUSCULOSKELETAL NEGATIVE: 1
EYES NEGATIVE: 1
NECK NEGATIVE: 1

## 2023-11-28 ASSESSMENT — CHADS2 SCORE
PRIOR STROKE OR TIA OR THROMBOEMBOLISM: NO
AGE GREATER THAN OR EQUAL TO 75: NO
CHADS2 SCORE: 1
DIABETES: NO
HYPERTENSION: YES
CHF: NO

## 2023-11-28 ASSESSMENT — LIFESTYLE VARIABLES: SMOKING_STATUS: NONSMOKER

## 2023-11-28 NOTE — PREPROCEDURE INSTRUCTIONS
NPO Instructions:    Do not eat any food after midnight the night before your surgery/procedure.  You may have up to TEN ounces of clear liquids until TWO hours before your instructed arrival time to the hospital. This includes water, black tea/coffee, (no milk or cream), apple juice, and/or electrolyte drinks (Gatorade).  Yo may chew gum up to TWO hours before your surgery/procedure.    Additional Instructions:     We have sent a prescription for Peridex mouth wash to your preferred pharmacy.  If you have not already, Please  your prescription and start using as directed before surgery.  Follow the instruction sheet provided to you at your CPM/PAT appointment.    Avoid herbal supplements, multivitamins and NSAIDS (non-steroidal anti-inflammatory drugs) such as Advil, Aleve, Ibuprofen, Naproxen, Excedrin, Meloxicam or Celebrex for at least 7 days prior to surgery. May take Tylenol as needed.    Seven/Six Days before Surgery:  Review your medication instructions, stop indicated medications    Day of Surgery:  Review your medication instructions, take indicated medications  Wear comfortable loose fitting clothing  Do not use moisturizers, creams, lotions or perfume  All jewelry and valuables should be left at home    Handy Maguire [facility]  [facility]  [phone]  [phone]  Email-[email or web address]

## 2023-11-28 NOTE — H&P (VIEW-ONLY)
CPM/PAT Evaluation       Name: Vic Wise (Vic Wise)  /Age: 1955/68 y.o.     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 Now with recent complaints of obstructive type symptoms. Recent imaging with no evidence of bowel obstruction. He presents today for perioperative evaluation in anticipation of exploration laparotomylysis adhesions abdominal cavity, resection small intestine on 23 with Dr. Nava.  Past Medical History:   Diagnosis Date    Anemia     Arrhythmia     Cataract     Encounter for general adult medical examination without abnormal findings 10/11/2021    Encounter for Medicare annual wellness exam    Encounter for general adult medical examination without abnormal findings 2022    Medicare annual wellness visit, subsequent    Encounter for general adult medical examination without abnormal findings 10/14/2020    Encounter for Medicare annual wellness exam    Encounter for immunization 10/14/2020    Encounter for immunization    Encounter for screening for malignant neoplasm of colon 2022    Colon cancer screening    Encounter for screening for malignant neoplasm of colon 10/14/2020    Colon cancer screening    Encounter for screening for malignant neoplasm of prostate 2019    Screening PSA (prostate specific antigen)    Encounter for screening for malignant neoplasm of prostate 2022    Screening PSA (prostate specific antigen)    Encounter for screening for other disorder 2022    Special screening for other conditions    GI (gastrointestinal bleed)     History of blood transfusion     Hordeolum externum left eye, unspecified eyelid 2020    Hordeolum externum of left eye, unspecified eyelid    Hyperlipidemia     Other  specified disorders of eye and adnexa 05/17/2020    Redness of eye, left    Other specified disorders of nose and nasal sinuses 12/14/2020    Sinus drainage    Personal history of other diseases of the nervous system and sense organs 05/17/2020    History of conjunctivitis    Personal history of other drug therapy 10/11/2021    History of influenza vaccination       Past Surgical History:   Procedure Laterality Date    BACK SURGERY      CATARACT EXTRACTION      COLON SURGERY         Patient  reports being sexually active and has had partner(s) who are female.    Family History   Problem Relation Name Age of Onset    Other (cardiac arrhythmia) Father         No Known Allergies    Prior to Admission medications    Medication Sig Start Date End Date Taking? Authorizing Provider   apixaban (Eliquis) 5 mg tablet Take 1 tablet (5 mg) by mouth 2 times a day. 7/18/23   William Dasilva MD   chlorhexidine (Hibiclens) 4 % external liquid Use as directed daily preoperatively 11/28/23   MICHAEL Linares-CNP   dofetilide (Tikosyn) 250 mcg capsule Take 1 capsule (250 mcg) by mouth 2 times a day. 7/18/23   William Dasilva MD   gabapentin (Neurontin) 100 mg capsule Take 1 capsule (100 mg) by mouth once daily at bedtime for 3 days. Take one capsule nightly beginning 3 days before surgery. 11/10/23 11/13/23  SIMA Culver   lisinopril 20 mg tablet TAKE ONE TABLET BY MOUTH EVERY DAY 11/15/23   William Dasilva MD   magnesium oxide (Mag-Ox) 400 mg (241.3 mg magnesium) tablet Take 1 tablet (400 mg) by mouth once daily.    Historical Provider, MD   simvastatin (Zocor) 40 mg tablet Take 1 tablet (40 mg) by mouth once daily at bedtime. 7/18/23   William Dasilva MD   omeprazole OTC (PriLOSEC OTC) 20 mg EC tablet Take 1 tablet (20 mg) by mouth once daily. Do not crush, chew, or split. 8/1/23 11/28/23  William Dasilva MD        PAT ROS:   Constitutional:   neg    Neuro/Psych:   neg    Eyes:   neg    Ears:   neg    Nose:   neg    Mouth:    neg    Throat:   neg    Neck:   neg    Cardio:   neg    Respiratory:   neg    Endocrine:   neg    GI:    intermittent   abdominal pain   nausea   vomiting  :   neg    Musculoskeletal:   neg    Hematologic:   neg    Skin:  neg        Physical Exam  Vitals reviewed.   Constitutional:       Appearance: Normal appearance.   HENT:      Head: Normocephalic and atraumatic.      Nose: Nose normal.      Mouth/Throat:      Mouth: Mucous membranes are moist.      Pharynx: Oropharynx is clear.   Eyes:      Extraocular Movements: Extraocular movements intact.      Pupils: Pupils are equal, round, and reactive to light.   Cardiovascular:      Rate and Rhythm: Normal rate and regular rhythm.      Pulses: Normal pulses.      Heart sounds: Normal heart sounds.   Pulmonary:      Effort: Pulmonary effort is normal.      Breath sounds: Normal breath sounds.   Musculoskeletal:         General: Normal range of motion.      Cervical back: Normal range of motion.   Skin:     General: Skin is warm and dry.   Neurological:      General: No focal deficit present.      Mental Status: He is alert and oriented to person, place, and time.   Psychiatric:         Mood and Affect: Mood normal.         Behavior: Behavior normal.          PAT AIRWAY:   Airway:     Mallampati::  III    TM distance::  >3 FB    Neck ROM::  Full  normal        Visit Vitals  /74   Pulse 88   Temp 36.7 °C (98 °F) (Oral)       DASI Risk Score      Flowsheet Row Most Recent Value   DASI SCORE 58.2   METS Score (Will be calculated only when all the questions are answered) 9.9          Caprini DVT Assessment      Flowsheet Row Most Recent Value   DVT Score 9   Current Status Major surgery planned, lasting over 3 hours   History Prior major surgery   Age 60-75 years   BMI 30 or less          Modified Frailty Index      Flowsheet Row Most Recent Value   Modified Frailty Index Calculator .0909          CHADS2 Stroke Risk  Current as of 2 hours ago        2.8% 3 - 100%:  High Risk   2 - 3%: Medium Risk   0 - 2%: Low Risk     No Change          This score determines the patient's risk of having a stroke if the patient has atrial fibrillation.          Points Metrics   0 Has Congestive Heart Failure:  No     Patients with congestive heart failure get 1 point.    Current as of 2 hours ago   1 Has Hypertension:  Yes     Patients with hypertension get 1 point.    Current as of 2 hours ago   0 Age:  68     Patients who are 75 years of age or older get 1 point.    Current as of 2 hours ago   0 Has Diabetes:  No     Patients with diabetes get 1 point.    Current as of 2 hours ago   0 Had Stroke:  No  Had TIA:  No  Had Thromboembolism:  No     Patients who have had a stroke, TIA, or thromboembolism get 2 points.    Current as of 2 hours ago             Revised Cardiac Risk Index      Flowsheet Row Most Recent Value   Revised Cardiac Risk Calculator 1          Apfel Simplified Score      Flowsheet Row Most Recent Value   Apfel Simplified Score Calculator 2          Risk Analysis Index Results This Encounter    No data found in the last 1 encounters.       Stop Bang Score      Flowsheet Row Most Recent Value   Do you snore loudly? 0   Do you often feel tired or fatigued after your sleep? 0   Has anyone ever observed you stop breathing in your sleep? 0   Do you have or are you being treated for high blood pressure? 1   Recent BMI (Calculated) 27.8   Is BMI greater than 35 kg/m2? 0=No   Age older than 50 years old? 1=Yes   Is your neck circumference greater than 17 inches (Male) or 16 inches (Female)? 1   Gender - Male 1=Yes   STOP-BANG Total Score 4          Results for orders placed or performed in visit on 11/28/23 (from the past 96 hour(s))   CBC   Result Value Ref Range    WBC 4.4 4.4 - 11.3 x10*3/uL    nRBC 0.0 0.0 - 0.0 /100 WBCs    RBC 4.42 (L) 4.50 - 5.90 x10*6/uL    Hemoglobin 12.0 (L) 13.5 - 17.5 g/dL    Hematocrit 38.1 (L) 41.0 - 52.0 %    MCV 86 80 - 100 fL    MCH 27.1 26.0 - 34.0  pg    MCHC 31.5 (L) 32.0 - 36.0 g/dL    RDW 16.5 (H) 11.5 - 14.5 %    Platelets 366 150 - 450 x10*3/uL   Basic Metabolic Panel   Result Value Ref Range    Glucose 106 (H) 74 - 99 mg/dL    Sodium 142 136 - 145 mmol/L    Potassium 5.1 3.5 - 5.3 mmol/L    Chloride 107 98 - 107 mmol/L    Bicarbonate 26 21 - 32 mmol/L    Anion Gap 14 10 - 20 mmol/L    Urea Nitrogen 12 6 - 23 mg/dL    Creatinine 1.04 0.50 - 1.30 mg/dL    eGFR 77 >60 mL/min/1.73m*2    Calcium 9.4 8.6 - 10.6 mg/dL   Coagulation Screen   Result Value Ref Range    Protime 12.9 (H) 9.8 - 12.8 seconds    INR 1.1 0.9 - 1.1    aPTT 34 27 - 38 seconds   Type And Screen   Result Value Ref Range    ABO TYPE A     Rh TYPE POS     ANTIBODY SCREEN NEG         Diagnostic Results    EKG 11/15/23               Show images for ECG 12 Lead       TRANSTHORACIC ECHOCARDIOGRAM REPORT   Study Date:       1/24/2023     CONCLUSIONS:   1. Left ventricular systolic function is normal with a 60-65% estimated ejection fraction.   2. Borderline left ventricular hypertrophy. Diastolic function is indeterminate due to rhythm, but TDI is within normal limits.   3. Right ventricle has normal function, but appears to be mildly dilated.   4. Mildly dilated left atrium.   5. Mild to moderate mitral insufficiency with central jet.   6. Mild tricuspid insufficiency with estimated RVSP being mildly increased at 44 mmHg.   7. Trileaflet aortic valve with mild aortic insufficiency. Central jet.   8. Aortic root measures normal, and proximal ascending thoracic aorta measures upper limits of normal in size.   9. Compared to the report 2015, there is probably not much change over the mitral insufficiency may have worsened to a small degree. There was no comment about atrial fibrillation on that study.    Exercise Stress Test   Study Date:       1/24/2023         Summary:   1. Very low functional capacity for age at 4.6 MET, limited by generalized and leg fatigue.   2. Resting a.fib rate 99 with  rapid increase to 139 bpm at low level of exercise.   3. Non specific inferior lead st abnormality, no clear critieria for ischemia.   4. HR,BP product with no significant stt abnormality suggests he is not in high risk group for critical CAD.   5. The adequate level of stress was achieved.        Assessment and Plan:     Neuro:   The patient has no neurological diagnoses or significant findings on chart review, clinical presentation, and evaluation.  No grossly apparent perioperative risk. The patient is at increased risk for perioperative stroke secondary to a-fib, hypertension , hyperlipidemia, general anesthesia, operative time >2.5 hours. Handouts for preoperative brain exercises given to patient.    HEENT/Airway  No diagnoses, significant findings on chart review, clinical presentation, or evaluation.    Cardiovascular  The patient is scheduled for non-cardiac surgery associated with elevated risk.  The patient has no major cardiac contraindications to non- cardiac surgery.  RCRI  The patient meets 0-1 RCRI criteria and therefore has a less than 1% risk of major adverse cardiac complications.  METS  The patient's functional capacity capacity is greater than 4 METS.  EKG  The patient has no EKG or echocardiographic changes concerning for myocardial ischemia.  No further cardiac evaluation is indicated  Heart Failure  The patient has no known history of heart failure.  Additionally, the patient reports no symptoms of heart failure and demonstrates no signs of heart failure.  Hypertension Evaluation  The patient has a known history of hypertension that is controlled.  Patient's hypertension is most consistent with stage 1.  Heart Rhythm Evaluation  Patient has a history of afib s/p electrocardioversion to normal sinus rhythm by Dr. Abraham on February 24, 2023. Subsequently admitted for recurrence, drug load with dofetilide and IV magnesium with conversion back to SR.   Heart Valve Evaluation  The patient has no  known history of valvular heart disease. The patient has no symptoms or physical exam findings to suggest valvular heart disease.  CARDS EVAL  The patient follows with cardiology, Dr. Tirado. Patient was last seen 11/15/23. Per note, he is reasonably low risk to proceed with surgery as planned.  The patient has a 30-day risk for MACE of 1 predictor, 6.0% risk for cardiac death, nonfatal myocardial infarction, and nonfatal cardiac arrest.  KATHERIN score which indicates a 0.2% risk of intraoperative or 30-day postoperative.    Pulmonary   No significant findings on chart review or clinical presentation and evaluation. The patient is at increased risk of perioperative pulmonary complications secondary to upper abdominal surgery, advanced age greater than 60.  The patient has a stop bang score of 4, which places patient at intermediate risk for having SERGIO.  ARISCAT 41, Intermediate, 13.3% risk of in-hospital postoperative pulmonary complications  PRODIGY 23, high of respiratory depression episode.    Hematology  No diagnoses or significant findings on chart review or clinical presentation and evaluation.  Antiplatelet management   The patient is not currently receiving antiplatelet therapy.  Anticoagulation management  The patient is currently receiving anticoagulation therapy for afib,  Patient provided with DVT educational handout.  , The patient has been instructed to Stop Eliquis 2 days before surgery.   Caprini score 9, high risk of perioperative VTE  Transfusion Evaluation  A type and screen was obtained given the likelihood for perioperative transfusion of blood or blood products.    GI  The patient has diagnoses or significant findings on chart review or clinical presentation and evaluation significant for history of bowel obstruction s/p resection now with obstructive symptoms. Scheduled for surgery on 11/30/23 with Dr. Nava.  Eat 10- 0,  self-perceived oropharyngeal dysphagia scale (0-40)      Genitourinary  No diagnoses or significant findings on chart review or clinical presentation and evaluation.  No diagnoses or significant findings on chart review or clinical presentation and evaluation.  Renal  The patient has no known history of chronic kidney disease. No renal diagnoses or significant findings on chart review or clinical presentation and evaluation. The patient is scheduled for peritoneal surgery which places patient at higher risk of perioperative renal complications. The patient has specific risk factors associated with increased risk of perioperative renal complications due to age greater than 55, male gender, hypertension.    Musculoskeletal  No diagnoses or significant findings on chart review or clinical presentation and evaluation.    Endocrine  Diabetes Evaluation  The patient has no history of diabetes mellitus  Thyroid Disease Evaluation  The patient has no history of thyroid disease.    ID  No diagnoses or significant findings on chart review or clinical presentation and evaluation.    Other  Allergy to fragrance    -Preoperative medication instructions were provided and reviewed with the patient.  Any additional testing or evaluation was explained to the patient.  NPO Instructions were discussed, and the patient's questions were answered prior to conclusion of this encounter

## 2023-11-29 DIAGNOSIS — K56.609 SMALL BOWEL OBSTRUCTION (MULTI): ICD-10-CM

## 2023-11-30 LAB — STAPHYLOCOCCUS SPEC CULT: NORMAL

## 2023-12-04 RX ORDER — GABAPENTIN 100 MG/1
100 CAPSULE ORAL NIGHTLY
Qty: 2 CAPSULE | Refills: 0 | Status: SHIPPED
Start: 2023-12-04 | End: 2023-12-17 | Stop reason: HOSPADM

## 2023-12-11 ENCOUNTER — HOSPITAL ENCOUNTER (INPATIENT)
Facility: HOSPITAL | Age: 68
LOS: 6 days | Discharge: HOME | DRG: 330 | End: 2023-12-17
Attending: COLON & RECTAL SURGERY | Admitting: COLON & RECTAL SURGERY
Payer: MEDICARE

## 2023-12-11 ENCOUNTER — ANESTHESIA (OUTPATIENT)
Dept: OPERATING ROOM | Facility: HOSPITAL | Age: 68
DRG: 330 | End: 2023-12-11
Payer: MEDICARE

## 2023-12-11 ENCOUNTER — ANESTHESIA EVENT (OUTPATIENT)
Dept: OPERATING ROOM | Facility: HOSPITAL | Age: 68
DRG: 330 | End: 2023-12-11
Payer: MEDICARE

## 2023-12-11 DIAGNOSIS — K56.609 SMALL BOWEL OBSTRUCTION (MULTI): ICD-10-CM

## 2023-12-11 DIAGNOSIS — K56.50 SMALL BOWEL OBSTRUCTION DUE TO ADHESIONS (MULTI): Primary | ICD-10-CM

## 2023-12-11 PROCEDURE — 3700000002 HC GENERAL ANESTHESIA TIME - EACH INCREMENTAL 1 MINUTE: Performed by: COLON & RECTAL SURGERY

## 2023-12-11 PROCEDURE — 44120 REMOVAL OF SMALL INTESTINE: CPT | Performed by: COLON & RECTAL SURGERY

## 2023-12-11 PROCEDURE — 2720000007 HC OR 272 NO HCPCS: Performed by: COLON & RECTAL SURGERY

## 2023-12-11 PROCEDURE — A44005 PR FREEING BOWEL ADHESION,ENTEROLYSIS: Performed by: ANESTHESIOLOGY

## 2023-12-11 PROCEDURE — 2500000004 HC RX 250 GENERAL PHARMACY W/ HCPCS (ALT 636 FOR OP/ED): Performed by: ANESTHESIOLOGIST ASSISTANT

## 2023-12-11 PROCEDURE — 0DNW0ZZ RELEASE PERITONEUM, OPEN APPROACH: ICD-10-PCS | Performed by: COLON & RECTAL SURGERY

## 2023-12-11 PROCEDURE — 2500000005 HC RX 250 GENERAL PHARMACY W/O HCPCS: Performed by: ANESTHESIOLOGIST ASSISTANT

## 2023-12-11 PROCEDURE — 0DB80ZZ EXCISION OF SMALL INTESTINE, OPEN APPROACH: ICD-10-PCS | Performed by: COLON & RECTAL SURGERY

## 2023-12-11 PROCEDURE — 2500000004 HC RX 250 GENERAL PHARMACY W/ HCPCS (ALT 636 FOR OP/ED): Performed by: NURSE PRACTITIONER

## 2023-12-11 PROCEDURE — 3700000001 HC GENERAL ANESTHESIA TIME - INITIAL BASE CHARGE: Performed by: COLON & RECTAL SURGERY

## 2023-12-11 PROCEDURE — 96372 THER/PROPH/DIAG INJ SC/IM: CPT | Performed by: NURSE PRACTITIONER

## 2023-12-11 PROCEDURE — 3600000003 HC OR TIME - INITIAL BASE CHARGE - PROCEDURE LEVEL THREE: Performed by: COLON & RECTAL SURGERY

## 2023-12-11 PROCEDURE — 2500000004 HC RX 250 GENERAL PHARMACY W/ HCPCS (ALT 636 FOR OP/ED): Performed by: COLON & RECTAL SURGERY

## 2023-12-11 PROCEDURE — A44005 PR FREEING BOWEL ADHESION,ENTEROLYSIS: Performed by: ANESTHESIOLOGIST ASSISTANT

## 2023-12-11 PROCEDURE — 2500000004 HC RX 250 GENERAL PHARMACY W/ HCPCS (ALT 636 FOR OP/ED): Performed by: ANESTHESIOLOGY

## 2023-12-11 PROCEDURE — 2500000005 HC RX 250 GENERAL PHARMACY W/O HCPCS: Performed by: COLON & RECTAL SURGERY

## 2023-12-11 PROCEDURE — 88307 TISSUE EXAM BY PATHOLOGIST: CPT | Mod: TC,SUR,STJLAB | Performed by: NURSE PRACTITIONER

## 2023-12-11 PROCEDURE — 1100000001 HC PRIVATE ROOM DAILY

## 2023-12-11 PROCEDURE — 2500000001 HC RX 250 WO HCPCS SELF ADMINISTERED DRUGS (ALT 637 FOR MEDICARE OP): Performed by: COLON & RECTAL SURGERY

## 2023-12-11 PROCEDURE — 7100000002 HC RECOVERY ROOM TIME - EACH INCREMENTAL 1 MINUTE: Performed by: COLON & RECTAL SURGERY

## 2023-12-11 PROCEDURE — 88307 TISSUE EXAM BY PATHOLOGIST: CPT | Performed by: PATHOLOGY

## 2023-12-11 PROCEDURE — 7100000001 HC RECOVERY ROOM TIME - INITIAL BASE CHARGE: Performed by: COLON & RECTAL SURGERY

## 2023-12-11 PROCEDURE — 96372 THER/PROPH/DIAG INJ SC/IM: CPT | Performed by: COLON & RECTAL SURGERY

## 2023-12-11 PROCEDURE — 3600000008 HC OR TIME - EACH INCREMENTAL 1 MINUTE - PROCEDURE LEVEL THREE: Performed by: COLON & RECTAL SURGERY

## 2023-12-11 RX ORDER — DOFETILIDE 0.25 MG/1
250 CAPSULE ORAL 2 TIMES DAILY
Status: DISCONTINUED | OUTPATIENT
Start: 2023-12-11 | End: 2023-12-17 | Stop reason: HOSPADM

## 2023-12-11 RX ORDER — NALOXONE HYDROCHLORIDE 0.4 MG/ML
0.2 INJECTION, SOLUTION INTRAMUSCULAR; INTRAVENOUS; SUBCUTANEOUS EVERY 5 MIN PRN
Status: DISCONTINUED | OUTPATIENT
Start: 2023-12-11 | End: 2023-12-17 | Stop reason: HOSPADM

## 2023-12-11 RX ORDER — PROPOFOL 10 MG/ML
INJECTION, EMULSION INTRAVENOUS AS NEEDED
Status: DISCONTINUED | OUTPATIENT
Start: 2023-12-11 | End: 2023-12-11

## 2023-12-11 RX ORDER — SIMVASTATIN 40 MG/1
40 TABLET, FILM COATED ORAL NIGHTLY
Status: DISCONTINUED | OUTPATIENT
Start: 2023-12-11 | End: 2023-12-17 | Stop reason: HOSPADM

## 2023-12-11 RX ORDER — OXYCODONE AND ACETAMINOPHEN 5; 325 MG/1; MG/1
1 TABLET ORAL EVERY 4 HOURS PRN
Status: DISCONTINUED | OUTPATIENT
Start: 2023-12-11 | End: 2023-12-11 | Stop reason: HOSPADM

## 2023-12-11 RX ORDER — LIDOCAINE HYDROCHLORIDE 10 MG/ML
0.1 INJECTION, SOLUTION EPIDURAL; INFILTRATION; INTRACAUDAL; PERINEURAL ONCE
Status: DISCONTINUED | OUTPATIENT
Start: 2023-12-11 | End: 2023-12-11 | Stop reason: HOSPADM

## 2023-12-11 RX ORDER — LIDOCAINE HYDROCHLORIDE 20 MG/ML
INJECTION, SOLUTION INFILTRATION; PERINEURAL AS NEEDED
Status: DISCONTINUED | OUTPATIENT
Start: 2023-12-11 | End: 2023-12-11

## 2023-12-11 RX ORDER — LABETALOL HYDROCHLORIDE 5 MG/ML
5 INJECTION, SOLUTION INTRAVENOUS ONCE AS NEEDED
Status: COMPLETED | OUTPATIENT
Start: 2023-12-11 | End: 2023-12-11

## 2023-12-11 RX ORDER — ENOXAPARIN SODIUM 100 MG/ML
40 INJECTION SUBCUTANEOUS EVERY 24 HOURS
Status: DISCONTINUED | OUTPATIENT
Start: 2023-12-11 | End: 2023-12-17 | Stop reason: HOSPADM

## 2023-12-11 RX ORDER — DIPHENHYDRAMINE HYDROCHLORIDE 50 MG/ML
12.5 INJECTION INTRAMUSCULAR; INTRAVENOUS ONCE AS NEEDED
Status: DISCONTINUED | OUTPATIENT
Start: 2023-12-11 | End: 2023-12-11 | Stop reason: HOSPADM

## 2023-12-11 RX ORDER — HYDROMORPHONE HYDROCHLORIDE 1 MG/ML
0.5 INJECTION, SOLUTION INTRAMUSCULAR; INTRAVENOUS; SUBCUTANEOUS EVERY 5 MIN PRN
Status: DISCONTINUED | OUTPATIENT
Start: 2023-12-11 | End: 2023-12-11 | Stop reason: HOSPADM

## 2023-12-11 RX ORDER — SODIUM CHLORIDE, SODIUM LACTATE, POTASSIUM CHLORIDE, CALCIUM CHLORIDE 600; 310; 30; 20 MG/100ML; MG/100ML; MG/100ML; MG/100ML
100 INJECTION, SOLUTION INTRAVENOUS CONTINUOUS
Status: DISCONTINUED | OUTPATIENT
Start: 2023-12-11 | End: 2023-12-11 | Stop reason: HOSPADM

## 2023-12-11 RX ORDER — HEPARIN SODIUM 5000 [USP'U]/ML
5000 INJECTION, SOLUTION INTRAVENOUS; SUBCUTANEOUS ONCE
Status: COMPLETED | OUTPATIENT
Start: 2023-12-11 | End: 2023-12-11

## 2023-12-11 RX ORDER — HYDROMORPHONE HYDROCHLORIDE 1 MG/ML
0.2 INJECTION, SOLUTION INTRAMUSCULAR; INTRAVENOUS; SUBCUTANEOUS EVERY 5 MIN PRN
Status: DISCONTINUED | OUTPATIENT
Start: 2023-12-11 | End: 2023-12-11 | Stop reason: HOSPADM

## 2023-12-11 RX ORDER — DEXTROSE MONOHYDRATE, SODIUM CHLORIDE, AND POTASSIUM CHLORIDE 50; 1.49; 4.5 G/1000ML; G/1000ML; G/1000ML
40 INJECTION, SOLUTION INTRAVENOUS CONTINUOUS
Status: DISCONTINUED | OUTPATIENT
Start: 2023-12-11 | End: 2023-12-12

## 2023-12-11 RX ORDER — ACETAMINOPHEN 325 MG/1
975 TABLET ORAL ONCE
Status: DISCONTINUED | OUTPATIENT
Start: 2023-12-11 | End: 2023-12-11 | Stop reason: HOSPADM

## 2023-12-11 RX ORDER — ONDANSETRON 4 MG/1
4 TABLET, ORALLY DISINTEGRATING ORAL EVERY 8 HOURS PRN
Status: DISCONTINUED | OUTPATIENT
Start: 2023-12-11 | End: 2023-12-17 | Stop reason: HOSPADM

## 2023-12-11 RX ORDER — FENTANYL CITRATE 50 UG/ML
INJECTION, SOLUTION INTRAMUSCULAR; INTRAVENOUS AS NEEDED
Status: DISCONTINUED | OUTPATIENT
Start: 2023-12-11 | End: 2023-12-11

## 2023-12-11 RX ORDER — LISINOPRIL 20 MG/1
20 TABLET ORAL DAILY
Status: DISCONTINUED | OUTPATIENT
Start: 2023-12-11 | End: 2023-12-17 | Stop reason: HOSPADM

## 2023-12-11 RX ORDER — OXYCODONE HYDROCHLORIDE 10 MG/1
10 TABLET ORAL EVERY 4 HOURS PRN
Status: DISCONTINUED | OUTPATIENT
Start: 2023-12-11 | End: 2023-12-17 | Stop reason: HOSPADM

## 2023-12-11 RX ORDER — ACETAMINOPHEN 325 MG/1
1000 TABLET ORAL ONCE
Status: DISCONTINUED | OUTPATIENT
Start: 2023-12-11 | End: 2023-12-11 | Stop reason: HOSPADM

## 2023-12-11 RX ORDER — MIDAZOLAM HYDROCHLORIDE 1 MG/ML
INJECTION, SOLUTION INTRAMUSCULAR; INTRAVENOUS AS NEEDED
Status: DISCONTINUED | OUTPATIENT
Start: 2023-12-11 | End: 2023-12-11

## 2023-12-11 RX ORDER — NORETHINDRONE AND ETHINYL ESTRADIOL 0.5-0.035
KIT ORAL AS NEEDED
Status: DISCONTINUED | OUTPATIENT
Start: 2023-12-11 | End: 2023-12-11

## 2023-12-11 RX ORDER — PHENYLEPHRINE HCL IN 0.9% NACL 1 MG/10 ML
SYRINGE (ML) INTRAVENOUS AS NEEDED
Status: DISCONTINUED | OUTPATIENT
Start: 2023-12-11 | End: 2023-12-11

## 2023-12-11 RX ORDER — CEFAZOLIN SODIUM 2 G/100ML
2 INJECTION, SOLUTION INTRAVENOUS ONCE
Status: COMPLETED | OUTPATIENT
Start: 2023-12-11 | End: 2023-12-11

## 2023-12-11 RX ORDER — DEXAMETHASONE SODIUM PHOSPHATE 10 MG/ML
INJECTION INTRAMUSCULAR; INTRAVENOUS AS NEEDED
Status: DISCONTINUED | OUTPATIENT
Start: 2023-12-11 | End: 2023-12-11

## 2023-12-11 RX ORDER — MORPHINE SULFATE 4 MG/ML
4 INJECTION, SOLUTION INTRAMUSCULAR; INTRAVENOUS EVERY 4 HOURS PRN
Status: DISCONTINUED | OUTPATIENT
Start: 2023-12-11 | End: 2023-12-17 | Stop reason: HOSPADM

## 2023-12-11 RX ORDER — ROCURONIUM BROMIDE 10 MG/ML
INJECTION, SOLUTION INTRAVENOUS AS NEEDED
Status: DISCONTINUED | OUTPATIENT
Start: 2023-12-11 | End: 2023-12-11

## 2023-12-11 RX ORDER — METRONIDAZOLE 500 MG/100ML
500 INJECTION, SOLUTION INTRAVENOUS ONCE
Status: COMPLETED | OUTPATIENT
Start: 2023-12-11 | End: 2023-12-11

## 2023-12-11 RX ORDER — OXYCODONE HYDROCHLORIDE 5 MG/1
5 TABLET ORAL EVERY 4 HOURS PRN
Status: DISCONTINUED | OUTPATIENT
Start: 2023-12-11 | End: 2023-12-11 | Stop reason: HOSPADM

## 2023-12-11 RX ORDER — HYDRALAZINE HYDROCHLORIDE 20 MG/ML
5 INJECTION INTRAMUSCULAR; INTRAVENOUS EVERY 30 MIN PRN
Status: DISCONTINUED | OUTPATIENT
Start: 2023-12-11 | End: 2023-12-11 | Stop reason: HOSPADM

## 2023-12-11 RX ORDER — ONDANSETRON HYDROCHLORIDE 2 MG/ML
4 INJECTION, SOLUTION INTRAVENOUS ONCE AS NEEDED
Status: COMPLETED | OUTPATIENT
Start: 2023-12-11 | End: 2023-12-11

## 2023-12-11 RX ORDER — ACETAMINOPHEN 325 MG/1
650 TABLET ORAL EVERY 4 HOURS PRN
Status: DISCONTINUED | OUTPATIENT
Start: 2023-12-11 | End: 2023-12-17 | Stop reason: HOSPADM

## 2023-12-11 RX ORDER — ALBUTEROL SULFATE 0.83 MG/ML
2.5 SOLUTION RESPIRATORY (INHALATION) ONCE AS NEEDED
Status: DISCONTINUED | OUTPATIENT
Start: 2023-12-11 | End: 2023-12-11 | Stop reason: HOSPADM

## 2023-12-11 RX ORDER — ONDANSETRON HYDROCHLORIDE 2 MG/ML
4 INJECTION, SOLUTION INTRAVENOUS EVERY 8 HOURS PRN
Status: DISCONTINUED | OUTPATIENT
Start: 2023-12-11 | End: 2023-12-17 | Stop reason: HOSPADM

## 2023-12-11 RX ORDER — OXYCODONE HYDROCHLORIDE 5 MG/1
5 TABLET ORAL EVERY 4 HOURS PRN
Status: DISCONTINUED | OUTPATIENT
Start: 2023-12-11 | End: 2023-12-17 | Stop reason: HOSPADM

## 2023-12-11 RX ORDER — ONDANSETRON HYDROCHLORIDE 2 MG/ML
INJECTION, SOLUTION INTRAVENOUS AS NEEDED
Status: DISCONTINUED | OUTPATIENT
Start: 2023-12-11 | End: 2023-12-11

## 2023-12-11 RX ADMIN — Medication 200 MCG: at 11:22

## 2023-12-11 RX ADMIN — LIDOCAINE HYDROCHLORIDE 100 MG: 20 INJECTION, SOLUTION INFILTRATION; PERINEURAL at 11:10

## 2023-12-11 RX ADMIN — LABETALOL HYDROCHLORIDE 5 MG: 5 INJECTION, SOLUTION INTRAVENOUS at 14:21

## 2023-12-11 RX ADMIN — HYDROMORPHONE HYDROCHLORIDE 0.5 MG: 1 INJECTION, SOLUTION INTRAMUSCULAR; INTRAVENOUS; SUBCUTANEOUS at 14:06

## 2023-12-11 RX ADMIN — SODIUM CHLORIDE, SODIUM LACTATE, POTASSIUM CHLORIDE, AND CALCIUM CHLORIDE: 600; 310; 30; 20 INJECTION, SOLUTION INTRAVENOUS at 10:35

## 2023-12-11 RX ADMIN — MORPHINE SULFATE 4 MG: 4 INJECTION, SOLUTION INTRAMUSCULAR; INTRAVENOUS at 21:13

## 2023-12-11 RX ADMIN — OXYCODONE HYDROCHLORIDE 10 MG: 10 TABLET ORAL at 17:52

## 2023-12-11 RX ADMIN — OXYCODONE HYDROCHLORIDE 10 MG: 10 TABLET ORAL at 22:08

## 2023-12-11 RX ADMIN — LISINOPRIL 20 MG: 20 TABLET ORAL at 16:24

## 2023-12-11 RX ADMIN — EPHEDRINE SULFATE 10 MG: 50 INJECTION, SOLUTION INTRAVENOUS at 12:07

## 2023-12-11 RX ADMIN — Medication: at 15:45

## 2023-12-11 RX ADMIN — SODIUM CHLORIDE, POTASSIUM CHLORIDE, SODIUM LACTATE AND CALCIUM CHLORIDE 100 ML/HR: 600; 310; 30; 20 INJECTION, SOLUTION INTRAVENOUS at 13:19

## 2023-12-11 RX ADMIN — SUGAMMADEX 200 MG: 100 INJECTION, SOLUTION INTRAVENOUS at 12:51

## 2023-12-11 RX ADMIN — FENTANYL CITRATE 50 MCG: 50 INJECTION, SOLUTION INTRAMUSCULAR; INTRAVENOUS at 13:04

## 2023-12-11 RX ADMIN — PROPOFOL 200 MG: 10 INJECTION, EMULSION INTRAVENOUS at 11:10

## 2023-12-11 RX ADMIN — FENTANYL CITRATE 50 MCG: 50 INJECTION, SOLUTION INTRAMUSCULAR; INTRAVENOUS at 11:50

## 2023-12-11 RX ADMIN — HEPARIN SODIUM 5000 UNITS: 5000 INJECTION, SOLUTION INTRAVENOUS; SUBCUTANEOUS at 08:45

## 2023-12-11 RX ADMIN — POTASSIUM CHLORIDE, DEXTROSE MONOHYDRATE AND SODIUM CHLORIDE 40 ML/HR: 150; 5; 450 INJECTION, SOLUTION INTRAVENOUS at 15:45

## 2023-12-11 RX ADMIN — Medication 100 MCG: at 11:30

## 2023-12-11 RX ADMIN — DOFETILIDE 250 MCG: 0.25 CAPSULE ORAL at 21:15

## 2023-12-11 RX ADMIN — ONDANSETRON 4 MG: 2 INJECTION INTRAMUSCULAR; INTRAVENOUS at 13:50

## 2023-12-11 RX ADMIN — HYDROMORPHONE HYDROCHLORIDE 0.5 MG: 1 INJECTION, SOLUTION INTRAMUSCULAR; INTRAVENOUS; SUBCUTANEOUS at 13:51

## 2023-12-11 RX ADMIN — METRONIDAZOLE 500 MG: 500 INJECTION, SOLUTION INTRAVENOUS at 11:19

## 2023-12-11 RX ADMIN — FENTANYL CITRATE 50 MCG: 50 INJECTION, SOLUTION INTRAMUSCULAR; INTRAVENOUS at 13:07

## 2023-12-11 RX ADMIN — CEFAZOLIN SODIUM 2 G: 2 INJECTION, SOLUTION INTRAVENOUS at 11:15

## 2023-12-11 RX ADMIN — Medication 100 MCG: at 11:42

## 2023-12-11 RX ADMIN — FENTANYL CITRATE 25 MCG: 50 INJECTION, SOLUTION INTRAMUSCULAR; INTRAVENOUS at 11:00

## 2023-12-11 RX ADMIN — FENTANYL CITRATE 25 MCG: 50 INJECTION, SOLUTION INTRAMUSCULAR; INTRAVENOUS at 12:58

## 2023-12-11 RX ADMIN — ONDANSETRON 4 MG: 2 INJECTION INTRAMUSCULAR; INTRAVENOUS at 12:42

## 2023-12-11 RX ADMIN — ENOXAPARIN SODIUM 40 MG: 40 INJECTION SUBCUTANEOUS at 16:23

## 2023-12-11 RX ADMIN — Medication 15 MG: at 11:50

## 2023-12-11 RX ADMIN — Medication 100 MCG: at 11:59

## 2023-12-11 RX ADMIN — SIMVASTATIN 40 MG: 40 TABLET, FILM COATED ORAL at 21:15

## 2023-12-11 RX ADMIN — DEXAMETHASONE SODIUM PHOSPHATE 10 MG: 10 INJECTION, SOLUTION INTRAMUSCULAR; INTRAVENOUS at 11:27

## 2023-12-11 RX ADMIN — ROCURONIUM BROMIDE 50 MG: 10 INJECTION INTRAVENOUS at 11:10

## 2023-12-11 RX ADMIN — HYDROMORPHONE HYDROCHLORIDE 0.5 MG: 1 INJECTION, SOLUTION INTRAMUSCULAR; INTRAVENOUS; SUBCUTANEOUS at 13:15

## 2023-12-11 RX ADMIN — HYDROMORPHONE HYDROCHLORIDE 0.5 MG: 1 INJECTION, SOLUTION INTRAMUSCULAR; INTRAVENOUS; SUBCUTANEOUS at 13:36

## 2023-12-11 RX ADMIN — MIDAZOLAM 2 MG: 1 INJECTION INTRAMUSCULAR; INTRAVENOUS at 11:00

## 2023-12-11 SDOH — HEALTH STABILITY: MENTAL HEALTH: CURRENT SMOKER: 0

## 2023-12-11 SDOH — SOCIAL STABILITY: SOCIAL INSECURITY: HAS ANYONE EVER THREATENED TO HURT YOUR FAMILY OR YOUR PETS?: NO

## 2023-12-11 SDOH — SOCIAL STABILITY: SOCIAL INSECURITY: WERE YOU ABLE TO COMPLETE ALL THE BEHAVIORAL HEALTH SCREENINGS?: YES

## 2023-12-11 SDOH — SOCIAL STABILITY: SOCIAL INSECURITY: HAVE YOU HAD THOUGHTS OF HARMING ANYONE ELSE?: YES

## 2023-12-11 SDOH — SOCIAL STABILITY: SOCIAL INSECURITY: DO YOU FEEL UNSAFE GOING BACK TO THE PLACE WHERE YOU ARE LIVING?: NO

## 2023-12-11 SDOH — SOCIAL STABILITY: SOCIAL INSECURITY: ABUSE: ADULT

## 2023-12-11 SDOH — SOCIAL STABILITY: SOCIAL INSECURITY: ARE YOU OR HAVE YOU BEEN THREATENED OR ABUSED PHYSICALLY, EMOTIONALLY, OR SEXUALLY BY ANYONE?: NO

## 2023-12-11 SDOH — SOCIAL STABILITY: SOCIAL INSECURITY: DO YOU FEEL ANYONE HAS EXPLOITED OR TAKEN ADVANTAGE OF YOU FINANCIALLY OR OF YOUR PERSONAL PROPERTY?: NO

## 2023-12-11 SDOH — SOCIAL STABILITY: SOCIAL INSECURITY: DOES ANYONE TRY TO KEEP YOU FROM HAVING/CONTACTING OTHER FRIENDS OR DOING THINGS OUTSIDE YOUR HOME?: NO

## 2023-12-11 SDOH — SOCIAL STABILITY: SOCIAL INSECURITY: ARE THERE ANY APPARENT SIGNS OF INJURIES/BEHAVIORS THAT COULD BE RELATED TO ABUSE/NEGLECT?: NO

## 2023-12-11 ASSESSMENT — ACTIVITIES OF DAILY LIVING (ADL)
PATIENT'S MEMORY ADEQUATE TO SAFELY COMPLETE DAILY ACTIVITIES?: YES
HEARING - LEFT EAR: FUNCTIONAL
WALKS IN HOME: INDEPENDENT
GROOMING: INDEPENDENT
LACK_OF_TRANSPORTATION: NO
HEARING - RIGHT EAR: FUNCTIONAL
JUDGMENT_ADEQUATE_SAFELY_COMPLETE_DAILY_ACTIVITIES: YES
FEEDING YOURSELF: INDEPENDENT
BATHING: INDEPENDENT
ADEQUATE_TO_COMPLETE_ADL: YES
DRESSING YOURSELF: INDEPENDENT
TOILETING: INDEPENDENT

## 2023-12-11 ASSESSMENT — COGNITIVE AND FUNCTIONAL STATUS - GENERAL
DAILY ACTIVITIY SCORE: 24
PATIENT BASELINE BEDBOUND: NO
MOBILITY SCORE: 24
DAILY ACTIVITIY SCORE: 24
MOBILITY SCORE: 24

## 2023-12-11 ASSESSMENT — PAIN SCALES - GENERAL
PAINLEVEL_OUTOF10: 5 - MODERATE PAIN
PAINLEVEL_OUTOF10: 9
PAINLEVEL_OUTOF10: 5 - MODERATE PAIN
PAINLEVEL_OUTOF10: 8
PAINLEVEL_OUTOF10: 5 - MODERATE PAIN
PAINLEVEL_OUTOF10: 8
PAINLEVEL_OUTOF10: 8
PAINLEVEL_OUTOF10: 9

## 2023-12-11 ASSESSMENT — PATIENT HEALTH QUESTIONNAIRE - PHQ9
SUM OF ALL RESPONSES TO PHQ9 QUESTIONS 1 & 2: 0
1. LITTLE INTEREST OR PLEASURE IN DOING THINGS: NOT AT ALL
2. FEELING DOWN, DEPRESSED OR HOPELESS: NOT AT ALL

## 2023-12-11 ASSESSMENT — COLUMBIA-SUICIDE SEVERITY RATING SCALE - C-SSRS
1. IN THE PAST MONTH, HAVE YOU WISHED YOU WERE DEAD OR WISHED YOU COULD GO TO SLEEP AND NOT WAKE UP?: NO
2. HAVE YOU ACTUALLY HAD ANY THOUGHTS OF KILLING YOURSELF?: NO
6. HAVE YOU EVER DONE ANYTHING, STARTED TO DO ANYTHING, OR PREPARED TO DO ANYTHING TO END YOUR LIFE?: NO

## 2023-12-11 ASSESSMENT — PAIN - FUNCTIONAL ASSESSMENT: PAIN_FUNCTIONAL_ASSESSMENT: 0-10

## 2023-12-11 ASSESSMENT — PAIN DESCRIPTION - LOCATION
LOCATION: ABDOMEN
LOCATION: ABDOMEN

## 2023-12-11 ASSESSMENT — LIFESTYLE VARIABLES
AUDIT-C TOTAL SCORE: 0
SKIP TO QUESTIONS 9-10: 1
HOW OFTEN DO YOU HAVE 6 OR MORE DRINKS ON ONE OCCASION: NEVER
AUDIT-C TOTAL SCORE: 0
HOW MANY STANDARD DRINKS CONTAINING ALCOHOL DO YOU HAVE ON A TYPICAL DAY: PATIENT DOES NOT DRINK
HOW OFTEN DO YOU HAVE A DRINK CONTAINING ALCOHOL: NEVER

## 2023-12-11 NOTE — ANESTHESIA PREPROCEDURE EVALUATION
Patient: Vic Wise    Procedure Information       Date/Time: 12/11/23 0920    Procedures:       Exploration LaparotomyLysis Adhesions Abdominal Cavity      Resection Small Intestine    Location: [facility] OR 04 / [facility]    Surgeons: Mo Nava MD            Relevant Problems   Cardiovascular   (+) Abnormal EKG   (+) Atrial fibrillation (CMS/HCC)   (+) Essential hypertension   (+) HTN (hypertension)   (+) Hypercholesterolemia   (+) Hyperlipidemia   (+) Nonrheumatic mitral valve regurgitation   (+) Paroxysmal atrial fibrillation (CMS/HCC)   (+) Sinus node dysfunction (CMS/HCC)      Endocrine   (+) Class 1 obesity with body mass index (BMI) of 30.0 to 30.9 in adult      /Renal   (+) Chronic kidney disease, stage 3a (CMS/HCC)      Neuro/Psych   (+) Peripheral neuropathy      Pulmonary   (+) SERGIO (obstructive sleep apnea)      Hematology   (+) Chronic anticoagulation      Musculoskeletal   (+) Primary osteoarthritis involving multiple joints      Other   (+) Arthritis       Clinical information reviewed:    Allergies  Meds               NPO Detail:  NPO/Void Status  Date of Last Liquid: 12/11/23  Time of Last Liquid: 0700  Date of Last Solid: 12/08/23         Physical Exam    Airway  Mallampati: II  TM distance: >3 FB  Neck ROM: full     Cardiovascular   Rhythm: regular  Rate: normal     Dental - normal exam     Pulmonary   Breath sounds clear to auscultation     Abdominal            Anesthesia Plan    ASA 3     general     The patient is not a current smoker.    intravenous induction   Postoperative administration of opioids is intended.  Anesthetic plan and risks discussed with patient.    Plan discussed with CAA.    Plan and risks discussed with patient.

## 2023-12-11 NOTE — ANESTHESIA POSTPROCEDURE EVALUATION
Patient: Vic Wise    Procedure Summary       Date: 12/11/23 Room / Location: [facility] OR 04 / [facility]    Anesthesia Start: 1100 Anesthesia Stop: 1313    Procedures:       Exploration LaparotomyLysis Adhesions Abdominal Cavity      Resection Small Intestine Diagnosis:       Small bowel obstruction due to adhesions (CMS/HCC)      (Small bowel obstruction due to adhesions (CMS/HCC) [K56.50])    Surgeons: Mo Nava MD Responsible Provider: Isai Pérez MD    Anesthesia Type: general ASA Status: 3            Anesthesia Type: general    Vitals Value Taken Time   /77 12/11/23 1309   Temp 36.6 °C (97.9 °F) 12/11/23 1309   Pulse 65 12/11/23 1309   Resp 16 12/11/23 1309   SpO2 94 % 12/11/23 1309       Anesthesia Post Evaluation    Patient location during evaluation: PACU  Patient participation: complete - patient participated  Level of consciousness: awake  Pain management: adequate  Airway patency: patent  Cardiovascular status: acceptable  Respiratory status: acceptable  Hydration status: acceptable  Postoperative Nausea and Vomiting: none        No notable events documented.

## 2023-12-11 NOTE — NURSING NOTE
Pt arrived to floor from pacu via bed. Iv fluids infusing. Pt groggy but arouses easily. Rating pain #5 at this time. Sleeping unless interrupted.

## 2023-12-11 NOTE — CARE PLAN
The patient's goals for the shift include      The clinical goals for the shift include pain control  Pt with adequate pain control this shift.  Has not required any as needed pain med thus far since arrival from PACU

## 2023-12-11 NOTE — ANESTHESIA PROCEDURE NOTES
Airway  Date/Time: 12/11/2023 11:10 AM  Urgency: elective    Airway not difficult    Staffing  Performed: BREN   Authorized by: Isai Pérez MD    Performed by: BREN Vallecillo  Patient location during procedure: OR    Indications and Patient Condition  Indications for airway management: anesthesia  Sedation level: deep  Preoxygenated: yes  Patient position: sniffing  MILS maintained throughout  Mask difficulty assessment: 1 - vent by mask    Final Airway Details  Final airway type: endotracheal airway      Successful airway: ETT  Cuffed: yes   Successful intubation technique: direct laryngoscopy  Blade: Harry  Blade size: #4  ETT size (mm): 7.5  Cormack-Lehane Classification: grade IIa - partial view of glottis  Placement verified by: chest auscultation and capnometry   Inital cuff pressure (cm H2O): 24  Measured from: lips  Number of attempts at approach: 1

## 2023-12-11 NOTE — OP NOTE
Exploration LaparotomyLysis Adhesions Abdominal Cavity, Resection Small Intestine Operative Note     Date: 2023  OR Location: [facility] OR    Name: Vic Wise, : 1955, Age: 68 y.o., MRN: [id number], Sex: male    Diagnosis  Pre-op Diagnosis     * Small bowel obstruction due to adhesions (CMS/HCC) [K56.50] Post-op Diagnosis     * Small bowel obstruction due to adhesions (CMS/HCC) [K56.50]     Procedures  Exploration LaparotomyLysis Adhesions Abdominal Cavity  63845 - MS EXPLORATORY LAPAROTOMY CELIOTOMY W/WO BIOPSY SPX    Exploration LaparotomyLysis Adhesions Abdominal Cavity  01135 - MS ENTEROLSS FRING INTSTINAL ADHESION SPX    Resection Small Intestine  64437 - MS ENTRC RESCJ SMALL INTESTINE 1 RESCJ & ANAST      Surgeons      * Mo Nava - Primary    Resident/Fellow/Other Assistant:  Surgeon(s) and Role:    Procedure Summary  Anesthesia: General  ASA: III  Anesthesia Staff: Anesthesiologist: Isai Pérez MD  C-AA: BREN Vallecillo  Estimated Blood Loss: 30mL  Intra-op Medications:   Medication Name Total Dose   ceFAZolin in dextrose (iso-os) (Ancef) IVPB 2 g 2 g   metroNIDAZOLE in NaCl (iso-os) (Flagyl)  mg 500 mg              Anesthesia Record               Intraprocedure I/O Totals          Intake    Ketamine 0.00 mL    The total shown is the total volume documented since Anesthesia Start was filed.    Total Intake 0 mL          Specimen:   ID Type Source Tests Collected by Time   1 : SMALL BOWEL ANASTOMOSIS Tissue SMALL BOWEL /INTESTINE SEGMENTAL RESECTION SURGICAL PATHOLOGY EXAM Mo Nava MD 2023 1218        Staff:   Circulator: Cedrick Abreu RN  Relief Circulator: Afshan Dale RN  Scrub Person: Jonathan Feliciano; Alex Levi      Findings: Light adhesions with inflamed small bowel anastomosis. No dilation of the bowel.     Indications: Vic Wise is an 68 y.o. male who is having surgery for Small bowel obstruction due to adhesions (CMS/HCC) [K56.50].      The patient was seen in the preoperative area. The risks, benefits, complications, treatment options, non-operative alternatives, expected recovery and outcomes were discussed with the patient. The possibilities of reaction to medication, pulmonary aspiration, injury to surrounding structures, bleeding, recurrent infection, the need for additional procedures, failure to diagnose a condition, and creating a complication requiring transfusion or operation were discussed with the patient. The patient concurred with the proposed plan, giving informed consent.  The site of surgery was properly noted/marked if necessary per policy. The patient has been actively warmed in preoperative area. Preoperative antibiotics have been ordered and given within 1 hours of incision. Venous thrombosis prophylaxis have been ordered including bilateral sequential compression devices and chemical prophylaxis    Procedure Details: Patient was admitted to the operating room and placed supine on the OR table. He was prepped and draped in sterile manner. A midline lap incision was made. There were adhesions of the omentum to the abdominal wall. There was also adhesions of loops of bowel to the abdominal wall. These were all taken down using Metzenbaum scissors. A velasquez retractor was placed into the abdomen. The ligament of Treitz was identified and bowel adjacent to it was examined from a proximal to distal fashion. The bowel was normal until I got to the distal ileum. At that location, there was an anastomosis with a rubbery mesentery and inflammation noted. This was the only abnormality noted throughout the entire small bowel. I made a decision at that time to resect the anastomosis. OC-75 stapler was used to transect 5-7cm from the small bowel proximal and distal to the anastomosis. The mesentery was taken in a clamp tie technique. After transection, the corners of the bowel was trimmed off and the OC 75 stapler was inserted to each  limb of the bowel. The stapler was fired creating a common channel. The common opening was then closed using a TA- 60 stapler. A crotch stitch was placed at the bottom of the staple line. The mesenteric defect was closed using 3-0 vicryl stitch. The bowel was then returned to the abdominal cavity. Abdominal wall fascia was closed using a #1- loop PDS. The wound was irrigated and staples were used to close the skin.  Complications:  None; patient tolerated the procedure well.    Disposition: PACU - hemodynamically stable.  Condition: stable     Additional Details: n/a    Attending Attestation: I was present and scrubbed for the entire procedure.    oM Nava  Phone Number: [phone]

## 2023-12-12 LAB
ANION GAP SERPL CALC-SCNC: 13 MMOL/L (ref 10–20)
BUN SERPL-MCNC: 14 MG/DL (ref 6–23)
CALCIUM SERPL-MCNC: 8.8 MG/DL (ref 8.6–10.3)
CHLORIDE SERPL-SCNC: 101 MMOL/L (ref 98–107)
CO2 SERPL-SCNC: 25 MMOL/L (ref 21–32)
CREAT SERPL-MCNC: 1.06 MG/DL (ref 0.5–1.3)
ERYTHROCYTE [DISTWIDTH] IN BLOOD BY AUTOMATED COUNT: 15.7 % (ref 11.5–14.5)
GFR SERPL CREATININE-BSD FRML MDRD: 75 ML/MIN/1.73M*2
GLUCOSE SERPL-MCNC: 199 MG/DL (ref 74–99)
HCT VFR BLD AUTO: 35.3 % (ref 41–52)
HGB BLD-MCNC: 11.3 G/DL (ref 13.5–17.5)
MCH RBC QN AUTO: 27.4 PG (ref 26–34)
MCHC RBC AUTO-ENTMCNC: 32 G/DL (ref 32–36)
MCV RBC AUTO: 86 FL (ref 80–100)
NRBC BLD-RTO: 0 /100 WBCS (ref 0–0)
PLATELET # BLD AUTO: 315 X10*3/UL (ref 150–450)
POTASSIUM SERPL-SCNC: 4.4 MMOL/L (ref 3.5–5.3)
RBC # BLD AUTO: 4.13 X10*6/UL (ref 4.5–5.9)
SODIUM SERPL-SCNC: 135 MMOL/L (ref 136–145)
WBC # BLD AUTO: 14 X10*3/UL (ref 4.4–11.3)

## 2023-12-12 PROCEDURE — 80048 BASIC METABOLIC PNL TOTAL CA: CPT | Performed by: COLON & RECTAL SURGERY

## 2023-12-12 PROCEDURE — 2500000005 HC RX 250 GENERAL PHARMACY W/O HCPCS: Performed by: COLON & RECTAL SURGERY

## 2023-12-12 PROCEDURE — 2500000004 HC RX 250 GENERAL PHARMACY W/ HCPCS (ALT 636 FOR OP/ED): Performed by: COLON & RECTAL SURGERY

## 2023-12-12 PROCEDURE — 96372 THER/PROPH/DIAG INJ SC/IM: CPT | Performed by: COLON & RECTAL SURGERY

## 2023-12-12 PROCEDURE — 99231 SBSQ HOSP IP/OBS SF/LOW 25: CPT | Performed by: NURSE PRACTITIONER

## 2023-12-12 PROCEDURE — 2500000001 HC RX 250 WO HCPCS SELF ADMINISTERED DRUGS (ALT 637 FOR MEDICARE OP): Performed by: COLON & RECTAL SURGERY

## 2023-12-12 PROCEDURE — 36415 COLL VENOUS BLD VENIPUNCTURE: CPT | Performed by: COLON & RECTAL SURGERY

## 2023-12-12 PROCEDURE — 85027 COMPLETE CBC AUTOMATED: CPT | Performed by: COLON & RECTAL SURGERY

## 2023-12-12 PROCEDURE — 1100000001 HC PRIVATE ROOM DAILY

## 2023-12-12 RX ADMIN — ACETAMINOPHEN 650 MG: 325 TABLET ORAL at 06:23

## 2023-12-12 RX ADMIN — ENOXAPARIN SODIUM 40 MG: 40 INJECTION SUBCUTANEOUS at 14:45

## 2023-12-12 RX ADMIN — DOFETILIDE 250 MCG: 0.25 CAPSULE ORAL at 09:44

## 2023-12-12 RX ADMIN — SIMVASTATIN 40 MG: 40 TABLET, FILM COATED ORAL at 20:19

## 2023-12-12 RX ADMIN — POTASSIUM CHLORIDE, DEXTROSE MONOHYDRATE AND SODIUM CHLORIDE 40 ML/HR: 150; 5; 450 INJECTION, SOLUTION INTRAVENOUS at 09:46

## 2023-12-12 RX ADMIN — ONDANSETRON 4 MG: 2 INJECTION INTRAMUSCULAR; INTRAVENOUS at 06:22

## 2023-12-12 RX ADMIN — ONDANSETRON 4 MG: 4 TABLET, ORALLY DISINTEGRATING ORAL at 01:11

## 2023-12-12 RX ADMIN — OXYCODONE HYDROCHLORIDE 10 MG: 10 TABLET ORAL at 20:20

## 2023-12-12 RX ADMIN — DOFETILIDE 250 MCG: 0.25 CAPSULE ORAL at 20:19

## 2023-12-12 RX ADMIN — LISINOPRIL 20 MG: 20 TABLET ORAL at 09:44

## 2023-12-12 ASSESSMENT — COGNITIVE AND FUNCTIONAL STATUS - GENERAL
DAILY ACTIVITIY SCORE: 24
MOBILITY SCORE: 24

## 2023-12-12 ASSESSMENT — PAIN SCALES - GENERAL
PAINLEVEL_OUTOF10: 6
PAINLEVEL_OUTOF10: 3

## 2023-12-12 ASSESSMENT — PAIN DESCRIPTION - ORIENTATION: ORIENTATION: MID

## 2023-12-12 ASSESSMENT — PAIN DESCRIPTION - LOCATION
LOCATION: HEAD
LOCATION: ABDOMEN

## 2023-12-12 NOTE — PROGRESS NOTES
12/12/23 0913   Discharge Planning   Living Arrangements Spouse/significant other;Children   Support Systems Spouse/significant other;Children   Type of Residence Private residence   Home or Post Acute Services None   Patient expects to be discharged to: home   Does the patient need discharge transport arranged? No     Met with patient at bedside. Admitted for SBO. Pt lives with spouse and adult son and was independent PTA with no HHC or DME. Able to obtain medications. Pt plans to return home with no new discharge needs. Family will provide transport.

## 2023-12-12 NOTE — CARE PLAN
The patient's goals for the shift include      The clinical goals for the shift include pain control and patient will remain HD stable this shift  Pt ambulated in halls multiple times. Has not yet reported passing any flatus. Pt has had good pain control today. No narcotics or tylenol given this shift.voiding adequate amts clear yellow urine today in bathroom.

## 2023-12-12 NOTE — PROGRESS NOTES
Spiritual Care Visit    Clinical Encounter Type  Visited With: Patient  Routine Visit: Introduction  Continue Visiting: No                                            Taxonomy  Intended Effects: Build relationship of care and support, Preserve dignity and respect, Promote sense of peace  Methods: Offer support  Interventions: Share words of hope and inspiration    Patient was with his wife and son.  Patient shared his struggles.   was a supportive presence.

## 2023-12-12 NOTE — PROGRESS NOTES
Nutrition Initial Assessment:   Nutrition Assessment    Reason for Assessment: Admission nursing screening (MST score 4, wt loss and decreased appetite.)    Patient is a 68 y.o. male presenting from home w/wife and adult son for small bowel obstruction d/t adhesions. POD    1 Exploratory Laparotomy with Lysis of adhesions and small bowel resection. Surgery on consult.       Past Medical History: afib, HTN, DLD.    has a past medical history of Anemia, Arrhythmia, Cataract, Encounter for general adult medical examination without abnormal findings (10/11/2021), Encounter for general adult medical examination without abnormal findings (05/18/2022), Encounter for general adult medical examination without abnormal findings (10/14/2020), Encounter for immunization (10/14/2020), Encounter for screening for malignant neoplasm of colon (05/18/2022), Encounter for screening for malignant neoplasm of colon (10/14/2020), Encounter for screening for malignant neoplasm of prostate (09/17/2019), Encounter for screening for malignant neoplasm of prostate (05/18/2022), Encounter for screening for other disorder (05/18/2022), GI (gastrointestinal bleed), History of blood transfusion, Hordeolum externum left eye, unspecified eyelid (05/17/2020), Hyperlipidemia, Other specified disorders of eye and adnexa (05/17/2020), Other specified disorders of nose and nasal sinuses (12/14/2020), Personal history of other diseases of the nervous system and sense organs (05/17/2020), and Personal history of other drug therapy (10/11/2021).  Surgical History   has a past surgical history that includes Colon surgery; Back surgery; and Cataract extraction.      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 Pt went to see GI in July of this year and after testing was schedulef for current surgery. Pt reports altering his diet 1 year ago after symptoms  "began (decreased portions) and then the past 3 months drastically altered his eating d/t intolerance to bread, rice,chips, pizza, etc. Pt tolerated well: eggs, roast beef from [employer]'s (w/out bun), jello, pudding, applesauce and would drink a lot of decaf iced tea to \"push down\" the food. Pt has been avoiding sugar, caffeine and alcohol d/t ulcers and current symptoms. Pt endorses wife was a nurse and he is confident in his management of current medical situation.  Vitamin/Herbal Supplement Use: Pt recently purchased chocolate Boost (unsure variety)and drank a few of the pre-surgery drinks.  Food Allergies/Intolerances:  None  GI Symptoms:  feeling of \"blockage\" w/certain foods.   Oral Problems: None       Anthropometrics:  Height: 180.3 cm (5' 11\")   Weight: 90.6 kg (199 lb 12.8 oz)   BMI (Calculated): 27.88             Weight History:     Weight Change %:  Weight History / % Weight Change: per archives: 5/18/22 215#, 19/7/22 224#, 7/20/23 215#, 9/21/23 214#, 11/19/23 198#. Pt reports 26# wt loss x 3 months = 8.7% wt loss and archives reflect 7.5% x 3 months. Reports #.  Significant Weight Loss: Yes  Interpretation of Weight Loss: 7.5% in 3 months    Nutrition Focused Physical Exam Findings:    Subcutaneous Fat Loss:   Orbital Fat Pads: Well nourshed (slightly bulging fat pads)  Buccal Fat Pads: Well nourished (full, rounded cheeks)  Triceps: Well nourished (ample fat tissue)  Muscle Wasting:  Temporalis: Well nourished (well-defined muscle)  Pectoralis (Clavicular Region): Mild-Moderate (some protrusion of clavicle)  Quadriceps: Well nourished (well developed, well rounded)  Gastrocnemius: Well nourished (well developed bulbous muscle)  Edema:  Edema: none  Physical Findings:  Skin:  (surgical incision.)    Nutrition Significant Labs:  CBC Trend:   Results from last 7 days   Lab Units 12/12/23  0555   WBC AUTO x10*3/uL 14.0*   RBC AUTO x10*6/uL 4.13*   HEMOGLOBIN g/dL 11.3*   HEMATOCRIT % 35.3*   MCV fL 86 " "  PLATELETS AUTO x10*3/uL 315    , BMP Trend:   Results from last 7 days   Lab Units 12/12/23  0555   GLUCOSE mg/dL 199*   CALCIUM mg/dL 8.8   SODIUM mmol/L 135*   POTASSIUM mmol/L 4.4   CO2 mmol/L 25   CHLORIDE mmol/L 101   BUN mg/dL 14   CREATININE mg/dL 1.06    , A1C:No results found for: \"HGBA1C\", BG POCT trend:    , Lipid Panel:   Lab Results   Component Value Date    CHOL 167 09/29/2023    HDL 38.0 (A) 09/29/2023    CHHDL 4.4 09/29/2023    LDLF 94 09/29/2023    VLDL 35 09/29/2023    TRIG 176 (H) 09/29/2023    , Vit D: No results found for: \"VITD25\" , Vit B12: No results found for: \"RUYMQPZK57\"     Nutrition Specific Medications:      I/O:   Last BM Date: 12/10/23;          Dietary Orders (From admission, onward)       Start     Ordered    12/12/23 1436  Oral nutritional supplements  Until discontinued        Question Answer Comment   Deliver with Breakfast    Deliver with Dinner    Select supplement: Ensure High Protein        12/12/23 1436    12/12/23 1410  Adult diet Low fiber  Diet effective now        Question:  Diet type  Answer:  Low fiber    12/12/23 1409                     Estimated Needs:   Total Energy Estimated Needs (kCal):  (4411-0028 (22-25kcal/kg))     Total Protein Estimated Needs (g):  ( (1.0-1.2g/kg))        Method for Estimating Needs: 1mL/kcal        Nutrition Diagnosis   Malnutrition Diagnosis  Patient has Malnutrition Diagnosis: Yes  Diagnosis Status: New  Malnutrition Diagnosis: Moderate malnutrition related to acute disease or injury  As Evidenced by: unintentional wt loss of 7.5% x 3 months and pt consuming <75% of estimated energy needs x 3 months.    Nutrition Diagnosis  Patient has Nutrition Diagnosis: Yes  Diagnosis Status (1): New  Nutrition Diagnosis 1: Altered GI function  Related to (1): small bowel obstruction due to adhesions  As Evidenced by (1): POD 1 Exploratory Laparotomy with Lysis of adhesions and small bowel resection.       Nutrition " Interventions/Recommendations         Nutrition Prescription:  Individualized Nutrition Prescription Provided for : Diet: continue w/low fiber diet as ordered/tolerated.        Nutrition Interventions:   Food and/or Nutrient Delivery Interventions  Interventions: Meals and snacks, Medical food supplement  Meals and Snacks: Fiber-modified diet  Goal: consume 75% of meals.  Medical Food Supplement: Commercial beverage  Goal: will provide Ensure HP at breakfast and dinner meals (160kcal, 16g pro per serving).    Coordination of Nutrition Care by a Nutrition Professional  Collaboration and Referral of Nutrition Care:  (RN Erika PASTOR and doc halo SARAH SAMS re: A1c.)    Nutrition Education:   Encouraged PO intakes and reviewed diet progression along w/principles of low fiber diet. Pt denied further ed needs, feels confident in managing. Encouraged small frequent meals, oral nutrition supplements, slowly adding fiber as tolerated and chewing food well.        Nutrition Monitoring and Evaluation   Food/Nutrient Related History Monitoring  Monitoring and Evaluation Plan: Energy intake  Energy Intake: Estimated energy intake  Criteria: consume >75% of estimated energy needs. Consume ordered oral nutrition supplements.    Body Composition/Growth/Weight History  Monitoring and Evaluation Plan: Weight  Weight: Measured weight  Criteria: maintain stable wt.    Biochemical Data, Medical Tests and Procedures  Monitoring and Evaluation Plan: Glucose/endocrine profile  Glucose/Endocrine Profile: Hemoglobin A1c (HgbA1c), Glucose, casual  Criteria: Obtain HgbA1c with goal <7.0%. Glucose values 80-180mg/dL.    Nutrition Focused Physical Findings  Monitoring and Evaluation Plan: Digestive System, Skin  Digestive System: Decrease in appetite  Criteria: consume 75% of meals and ordered oral nutrition supplements.  Skin: Other (Comment)  Criteria: promote (incision) wound healing.       Time Spent/Follow-up Reminder:   Time Spent (min): 60  minutes  Follow up: Provided inpatient RDN contact information  Last Date of Nutrition Visit: 12/12/23  Nutrition Follow-Up Needed?: 3-8 days  Follow up Comment: PATI

## 2023-12-12 NOTE — PROGRESS NOTES
Vic Wise 68 y.o. male    Subjective  Patient seen and examined this morning.  Denies nausea and vomiting.  No fever or chills.  Reports belching a lot.  No flatus or BM as of yet.  Has not been out of bed.  Reports pain controlled, not requiring narcotics.  Urinating well.  Reports drinking a lot of fluids.  No acute events overnight.     Objective  PHYSICAL EXAM:  Physical Exam  Vitals reviewed.   Constitutional:       General: He is awake.      Appearance: Normal appearance.   Cardiovascular:      Rate and Rhythm: Normal rate and regular rhythm.      Pulses: Normal pulses.      Heart sounds: Normal heart sounds.   Pulmonary:      Effort: Pulmonary effort is normal.      Breath sounds: Normal breath sounds and air entry.   Abdominal:      General: Abdomen is flat. There is distension (Minimal).      Palpations: Abdomen is soft.      Tenderness: There is no guarding or rebound.      Comments: Abdomen minimally distended, soft.  Abdominal dressing C/D/I. Appropriately TTP.    Musculoskeletal:         General: Normal range of motion.      Cervical back: Normal range of motion.   Skin:     General: Skin is warm and dry.   Neurological:      General: No focal deficit present.      Mental Status: He is alert and oriented to person, place, and time.   Psychiatric:         Behavior: Behavior is cooperative.         Vital signs in last 24 hours:  Vitals:    12/12/23 0832   BP: 151/85   Pulse: 82   Resp: 18   Temp: 36.6 °C (97.9 °F)   SpO2: 96%         Intake/Output this shift:    Intake/Output Summary (Last 24 hours) at 12/12/2023 1118  Last data filed at 12/12/2023 0500  Gross per 24 hour   Intake 1000 ml   Output 840 ml   Net 160 ml        Allergies:  No Known Allergies     Medications:  Scheduled medications  dofetilide, 250 mcg, oral, BID  enoxaparin, 40 mg, subcutaneous, q24h  lisinopril, 20 mg, oral, Daily  oxygen, , inhalation, Continuous - 02/gases  simvastatin, 40 mg, oral, Nightly      Continuous  medications  potassium ggcctns-B8-5.45%NaCl, 40 mL/hr, Last Rate: 40 mL/hr (12/12/23 0946)      PRN medications  PRN medications: acetaminophen, morphine, naloxone, ondansetron ODT **OR** ondansetron, oxyCODONE, oxyCODONE       Labs:  Results for orders placed or performed during the hospital encounter of 12/11/23 (from the past 24 hour(s))   Basic Metabolic Panel   Result Value Ref Range    Glucose 199 (H) 74 - 99 mg/dL    Sodium 135 (L) 136 - 145 mmol/L    Potassium 4.4 3.5 - 5.3 mmol/L    Chloride 101 98 - 107 mmol/L    Bicarbonate 25 21 - 32 mmol/L    Anion Gap 13 10 - 20 mmol/L    Urea Nitrogen 14 6 - 23 mg/dL    Creatinine 1.06 0.50 - 1.30 mg/dL    eGFR 75 >60 mL/min/1.73m*2    Calcium 8.8 8.6 - 10.3 mg/dL   CBC   Result Value Ref Range    WBC 14.0 (H) 4.4 - 11.3 x10*3/uL    nRBC 0.0 0.0 - 0.0 /100 WBCs    RBC 4.13 (L) 4.50 - 5.90 x10*6/uL    Hemoglobin 11.3 (L) 13.5 - 17.5 g/dL    Hematocrit 35.3 (L) 41.0 - 52.0 %    MCV 86 80 - 100 fL    MCH 27.4 26.0 - 34.0 pg    MCHC 32.0 32.0 - 36.0 g/dL    RDW 15.7 (H) 11.5 - 14.5 %    Platelets 315 150 - 450 x10*3/uL        Imaging:  No results found.     Plan    POD#1: s/p Exploratory Laparotomy with Lysis of adhesions and small bowel resection    #Small bowel obstruction d/t adhesions  - surgery as above  - avss  - CLD  - pain control  - Nausea: antiemetics PRN  - DC IVF  - Encourage OOB and IS  - DVT prophylaxis: lovenox 40mg daily    #Atrial fibrillation  - Home tikosyn resumed  - Home eliquis on hold     #HTN  - Home lisinopril resumed     #HLD  - Home simvastatin resumed    - Daily labs    Plan of care discussed with Dr. Nava.     Alie Sevilla, APRN-CNP    I spent 25 minutes in the professional and overall care of this patient.

## 2023-12-13 ENCOUNTER — APPOINTMENT (OUTPATIENT)
Dept: RADIOLOGY | Facility: HOSPITAL | Age: 68
DRG: 330 | End: 2023-12-13
Payer: MEDICARE

## 2023-12-13 LAB
ANION GAP SERPL CALC-SCNC: 12 MMOL/L (ref 10–20)
BUN SERPL-MCNC: 15 MG/DL (ref 6–23)
CALCIUM SERPL-MCNC: 9.1 MG/DL (ref 8.6–10.3)
CHLORIDE SERPL-SCNC: 103 MMOL/L (ref 98–107)
CO2 SERPL-SCNC: 27 MMOL/L (ref 21–32)
CREAT SERPL-MCNC: 1.01 MG/DL (ref 0.5–1.3)
ERYTHROCYTE [DISTWIDTH] IN BLOOD BY AUTOMATED COUNT: 16.1 % (ref 11.5–14.5)
GFR SERPL CREATININE-BSD FRML MDRD: 80 ML/MIN/1.73M*2
GLUCOSE SERPL-MCNC: 131 MG/DL (ref 74–99)
HCT VFR BLD AUTO: 36.1 % (ref 41–52)
HGB BLD-MCNC: 11.3 G/DL (ref 13.5–17.5)
MAGNESIUM SERPL-MCNC: 1.88 MG/DL (ref 1.6–2.4)
MCH RBC QN AUTO: 27 PG (ref 26–34)
MCHC RBC AUTO-ENTMCNC: 31.3 G/DL (ref 32–36)
MCV RBC AUTO: 86 FL (ref 80–100)
NRBC BLD-RTO: 0 /100 WBCS (ref 0–0)
PHOSPHATE SERPL-MCNC: 2.4 MG/DL (ref 2.5–4.9)
PLATELET # BLD AUTO: 298 X10*3/UL (ref 150–450)
POTASSIUM SERPL-SCNC: 4 MMOL/L (ref 3.5–5.3)
RBC # BLD AUTO: 4.18 X10*6/UL (ref 4.5–5.9)
SODIUM SERPL-SCNC: 138 MMOL/L (ref 136–145)
WBC # BLD AUTO: 10.3 X10*3/UL (ref 4.4–11.3)

## 2023-12-13 PROCEDURE — 2500000001 HC RX 250 WO HCPCS SELF ADMINISTERED DRUGS (ALT 637 FOR MEDICARE OP): Performed by: NURSE PRACTITIONER

## 2023-12-13 PROCEDURE — 83735 ASSAY OF MAGNESIUM: CPT | Performed by: NURSE PRACTITIONER

## 2023-12-13 PROCEDURE — 85027 COMPLETE CBC AUTOMATED: CPT | Performed by: NURSE PRACTITIONER

## 2023-12-13 PROCEDURE — 2500000004 HC RX 250 GENERAL PHARMACY W/ HCPCS (ALT 636 FOR OP/ED): Performed by: COLON & RECTAL SURGERY

## 2023-12-13 PROCEDURE — 2500000004 HC RX 250 GENERAL PHARMACY W/ HCPCS (ALT 636 FOR OP/ED): Performed by: NURSE PRACTITIONER

## 2023-12-13 PROCEDURE — 1100000001 HC PRIVATE ROOM DAILY

## 2023-12-13 PROCEDURE — 2500000005 HC RX 250 GENERAL PHARMACY W/O HCPCS: Performed by: COLON & RECTAL SURGERY

## 2023-12-13 PROCEDURE — 36415 COLL VENOUS BLD VENIPUNCTURE: CPT | Performed by: NURSE PRACTITIONER

## 2023-12-13 PROCEDURE — 96372 THER/PROPH/DIAG INJ SC/IM: CPT | Performed by: COLON & RECTAL SURGERY

## 2023-12-13 PROCEDURE — 82374 ASSAY BLOOD CARBON DIOXIDE: CPT | Performed by: NURSE PRACTITIONER

## 2023-12-13 PROCEDURE — 99231 SBSQ HOSP IP/OBS SF/LOW 25: CPT | Performed by: NURSE PRACTITIONER

## 2023-12-13 PROCEDURE — 84100 ASSAY OF PHOSPHORUS: CPT | Performed by: NURSE PRACTITIONER

## 2023-12-13 PROCEDURE — 74022 RADEX COMPL AQT ABD SERIES: CPT | Mod: FY,FR

## 2023-12-13 PROCEDURE — 2500000001 HC RX 250 WO HCPCS SELF ADMINISTERED DRUGS (ALT 637 FOR MEDICARE OP): Performed by: COLON & RECTAL SURGERY

## 2023-12-13 PROCEDURE — 74022 RADEX COMPL AQT ABD SERIES: CPT | Mod: COMPUTED RADIOGRAPHY X-RAY | Performed by: RADIOLOGY

## 2023-12-13 RX ORDER — LANOLIN ALCOHOL/MO/W.PET/CERES
400 CREAM (GRAM) TOPICAL DAILY
Status: DISCONTINUED | OUTPATIENT
Start: 2023-12-13 | End: 2023-12-17 | Stop reason: HOSPADM

## 2023-12-13 RX ORDER — PROCHLORPERAZINE 25 MG/1
25 SUPPOSITORY RECTAL EVERY 12 HOURS PRN
Status: DISCONTINUED | OUTPATIENT
Start: 2023-12-13 | End: 2023-12-17 | Stop reason: HOSPADM

## 2023-12-13 RX ORDER — PROCHLORPERAZINE EDISYLATE 5 MG/ML
10 INJECTION INTRAMUSCULAR; INTRAVENOUS EVERY 6 HOURS PRN
Status: DISCONTINUED | OUTPATIENT
Start: 2023-12-13 | End: 2023-12-17 | Stop reason: HOSPADM

## 2023-12-13 RX ORDER — PROCHLORPERAZINE MALEATE 10 MG
10 TABLET ORAL EVERY 6 HOURS PRN
Status: DISCONTINUED | OUTPATIENT
Start: 2023-12-13 | End: 2023-12-17 | Stop reason: HOSPADM

## 2023-12-13 RX ADMIN — SIMVASTATIN 40 MG: 40 TABLET, FILM COATED ORAL at 20:27

## 2023-12-13 RX ADMIN — Medication 400 MG: at 14:46

## 2023-12-13 RX ADMIN — Medication 250 MG: at 13:05

## 2023-12-13 RX ADMIN — Medication 250 MG: at 17:23

## 2023-12-13 RX ADMIN — DOFETILIDE 250 MCG: 0.25 CAPSULE ORAL at 20:27

## 2023-12-13 RX ADMIN — ENOXAPARIN SODIUM 40 MG: 40 INJECTION SUBCUTANEOUS at 14:47

## 2023-12-13 RX ADMIN — LISINOPRIL 20 MG: 20 TABLET ORAL at 09:03

## 2023-12-13 RX ADMIN — MORPHINE SULFATE 4 MG: 4 INJECTION, SOLUTION INTRAMUSCULAR; INTRAVENOUS at 20:45

## 2023-12-13 RX ADMIN — DOFETILIDE 250 MCG: 0.25 CAPSULE ORAL at 09:03

## 2023-12-13 RX ADMIN — ONDANSETRON 4 MG: 4 TABLET, ORALLY DISINTEGRATING ORAL at 18:30

## 2023-12-13 ASSESSMENT — COGNITIVE AND FUNCTIONAL STATUS - GENERAL
DAILY ACTIVITIY SCORE: 24
MOBILITY SCORE: 24

## 2023-12-13 ASSESSMENT — PAIN SCALES - GENERAL
PAINLEVEL_OUTOF10: 0 - NO PAIN
PAINLEVEL_OUTOF10: 3
PAINLEVEL_OUTOF10: 7

## 2023-12-13 ASSESSMENT — PAIN - FUNCTIONAL ASSESSMENT
PAIN_FUNCTIONAL_ASSESSMENT: 0-10
PAIN_FUNCTIONAL_ASSESSMENT: 0-10

## 2023-12-13 NOTE — PROGRESS NOTES
Vic Wise 68 y.o. male    Subjective  Patient seen and examined this morning.  Denies nausea and vomiting.  No fever or chills.  Tolerating diet.  Urinating well.  No flatus or BM as of yet. Up ambulating.  Pain controlled.  No acute events overnight.    Objective  PHYSICAL EXAM:  Physical Exam  Vitals reviewed.   Constitutional:       General: He is awake.      Appearance: Normal appearance.   Cardiovascular:      Rate and Rhythm: Normal rate and regular rhythm.      Pulses: Normal pulses.      Heart sounds: Normal heart sounds.   Pulmonary:      Effort: Pulmonary effort is normal.      Breath sounds: Normal breath sounds and air entry.   Abdominal:      General: Abdomen is flat. There is no distension (Minimal).      Palpations: Abdomen is soft.      Tenderness: There is no guarding or rebound.      Comments: Abdominal incision with staples intact, C/D/I.  Appropriately TTP.    Musculoskeletal:         General: Normal range of motion.      Cervical back: Normal range of motion.   Skin:     General: Skin is warm and dry.   Neurological:      General: No focal deficit present.      Mental Status: He is alert and oriented to person, place, and time.   Psychiatric:         Behavior: Behavior is cooperative.         Vital signs in last 24 hours:  Vitals:    12/13/23 0700   BP: 143/81   Pulse: 97   Resp: 16   Temp: 36.6 °C (97.9 °F)   SpO2: 95%         Intake/Output this shift:    Intake/Output Summary (Last 24 hours) at 12/13/2023 1029  Last data filed at 12/13/2023 0914  Gross per 24 hour   Intake --   Output 550 ml   Net -550 ml        Allergies:  No Known Allergies     Medications:  Scheduled medications  dofetilide, 250 mcg, oral, BID  enoxaparin, 40 mg, subcutaneous, q24h  lisinopril, 20 mg, oral, Daily  oxygen, , inhalation, Continuous - 02/gases  simvastatin, 40 mg, oral, Nightly      Continuous medications     PRN medications  PRN medications: acetaminophen, morphine, naloxone, ondansetron ODT **OR**  ondansetron, oxyCODONE, oxyCODONE       Labs:  Results for orders placed or performed during the hospital encounter of 12/11/23 (from the past 24 hour(s))   CBC   Result Value Ref Range    WBC 10.3 4.4 - 11.3 x10*3/uL    nRBC 0.0 0.0 - 0.0 /100 WBCs    RBC 4.18 (L) 4.50 - 5.90 x10*6/uL    Hemoglobin 11.3 (L) 13.5 - 17.5 g/dL    Hematocrit 36.1 (L) 41.0 - 52.0 %    MCV 86 80 - 100 fL    MCH 27.0 26.0 - 34.0 pg    MCHC 31.3 (L) 32.0 - 36.0 g/dL    RDW 16.1 (H) 11.5 - 14.5 %    Platelets 298 150 - 450 x10*3/uL   Basic Metabolic Panel   Result Value Ref Range    Glucose 131 (H) 74 - 99 mg/dL    Sodium 138 136 - 145 mmol/L    Potassium 4.0 3.5 - 5.3 mmol/L    Chloride 103 98 - 107 mmol/L    Bicarbonate 27 21 - 32 mmol/L    Anion Gap 12 10 - 20 mmol/L    Urea Nitrogen 15 6 - 23 mg/dL    Creatinine 1.01 0.50 - 1.30 mg/dL    eGFR 80 >60 mL/min/1.73m*2    Calcium 9.1 8.6 - 10.3 mg/dL   Magnesium   Result Value Ref Range    Magnesium 1.88 1.60 - 2.40 mg/dL   Phosphorus   Result Value Ref Range    Phosphorus 2.4 (L) 2.5 - 4.9 mg/dL        Imaging:  No results found.     Plan  POD#2: s/p Exploratory Laparotomy with Lysis of adhesions and small bowel resection     #Small bowel obstruction d/t adhesions  - surgery as above  - avss  - Advance to low fiber   - pain control  - Nausea: antiemetics PRN  - DC IVF  - Encourage OOB and IS  - DVT prophylaxis: lovenox 40mg daily     #Atrial fibrillation  - Home tikosyn resumed  - Home eliquis on hold      #HTN  - Home lisinopril resumed      #HLD  - Home simvastatin resumed     #Hypophosphatemia  - replete    - Daily labs     Plan of care discussed with Dr. Nava.     Alie Sevilla, APRN-CNP    I spent 15 minutes in the professional and overall care of this patient.

## 2023-12-13 NOTE — CARE PLAN
The clinical goals for the shift include pain control and patient will remain HD stable this shift    Patient doing much better today, oxycodone given once this shift, no nausea, tolerating clear liquid diet. Patient still not passing gas but bowel sounds improving, present in all quadrants. VS all stable. No issues over night.      Problem: Pain  Goal: Participates in PT with improved pain control throughout the shift  Outcome: Progressing     Problem: Nutrition  Goal: Less than 5 days NPO/clear liquids  12/13/2023 0603 by Yordan Onofre RN  Outcome: Progressing  12/13/2023 0603 by Yordan Onofre RN  Outcome: Progressing  Goal: Oral intake greater 75%  12/13/2023 0603 by Yordan Onofre RN  Outcome: Progressing  12/13/2023 0603 by Yordan Onofre RN  Outcome: Progressing  Goal: Nutrition support goals are met within 48 hrs  12/13/2023 0603 by Yordan Onofre RN  Outcome: Progressing  12/13/2023 0603 by Yordan Onofre RN  Outcome: Progressing  Goal: Nutrition support is meeting 75% of nutrient needs  Outcome: Progressing     Problem: Discharge Planning  Goal: Discharge to home or other facility with appropriate resources  12/13/2023 0603 by Yordan Onofre RN  Outcome: Progressing  12/13/2023 0603 by Yordan Onofre RN  Outcome: Progressing     Problem: Pain  Goal: Takes deep breaths with improved pain control throughout the shift  12/13/2023 0603 by Yordan Onofre RN  Outcome: Met  12/13/2023 0603 by Yordan Onofre RN  Outcome: Progressing  Goal: Turns in bed with improved pain control throughout the shift  12/13/2023 0603 by Yordan Onofre RN  Outcome: Met  12/13/2023 0603 by Yordan Onofre RN  Outcome: Progressing  Goal: Walks with improved pain control throughout the shift  12/13/2023 0603 by Yordan Onofre RN  Outcome: Met  12/13/2023 0603 by Yordan Onofre RN  Outcome: Progressing  Goal: Performs ADL's with improved pain control throughout shift  12/13/2023 0603 by Yordan Onofre  RN  Outcome: Met  12/13/2023 0603 by Yordan Onofre RN  Outcome: Progressing  Goal: Free from opioid side effects throughout the shift  12/13/2023 0603 by Yordan Onofre RN  Outcome: Met  12/13/2023 0603 by Yordan Onofre RN  Outcome: Progressing  Goal: Free from acute confusion related to pain meds throughout the shift  12/13/2023 0603 by Yordan Onofre RN  Outcome: Met  12/13/2023 0603 by Yordan Onofre RN  Outcome: Progressing     Problem: Nutrition  Goal: Adequate PO fluid intake  Outcome: Met  Goal: BG  mg/dL  Outcome: Met  Goal: Lab values WNL  Outcome: Met  Goal: Electrolytes WNL  Outcome: Met  Goal: Promote healing  Outcome: Met  Goal: Maintain stable weight  Outcome: Met     Problem: Pain - Adult  Goal: Verbalizes/displays adequate comfort level or baseline comfort level  12/13/2023 0603 by Yordan Onofre RN  Outcome: Met  12/13/2023 0603 by Yordan Onofre RN  Outcome: Progressing     Problem: Safety - Adult  Goal: Free from fall injury  12/13/2023 0603 by Yrodan Onofre RN  Outcome: Met  12/13/2023 0603 by Yordan Onofre RN  Outcome: Progressing     Problem: Chronic Conditions and Co-morbidities  Goal: Patient's chronic conditions and co-morbidity symptoms are monitored and maintained or improved  12/13/2023 0603 by Yordan Onofre RN  Outcome: Met  12/13/2023 0603 by Yordan Onofre RN  Outcome: Progressing

## 2023-12-14 LAB
ANION GAP SERPL CALC-SCNC: 13 MMOL/L (ref 10–20)
BUN SERPL-MCNC: 18 MG/DL (ref 6–23)
CALCIUM SERPL-MCNC: 9.4 MG/DL (ref 8.6–10.3)
CHLORIDE SERPL-SCNC: 100 MMOL/L (ref 98–107)
CO2 SERPL-SCNC: 28 MMOL/L (ref 21–32)
CREAT SERPL-MCNC: 1.18 MG/DL (ref 0.5–1.3)
ERYTHROCYTE [DISTWIDTH] IN BLOOD BY AUTOMATED COUNT: 15.9 % (ref 11.5–14.5)
GFR SERPL CREATININE-BSD FRML MDRD: 66 ML/MIN/1.73M*2
GLUCOSE SERPL-MCNC: 164 MG/DL (ref 74–99)
HCT VFR BLD AUTO: 39.7 % (ref 41–52)
HGB BLD-MCNC: 12.2 G/DL (ref 13.5–17.5)
MAGNESIUM SERPL-MCNC: 1.92 MG/DL (ref 1.6–2.4)
MCH RBC QN AUTO: 27.2 PG (ref 26–34)
MCHC RBC AUTO-ENTMCNC: 30.7 G/DL (ref 32–36)
MCV RBC AUTO: 89 FL (ref 80–100)
NRBC BLD-RTO: 0 /100 WBCS (ref 0–0)
PHOSPHATE SERPL-MCNC: 4.5 MG/DL (ref 2.5–4.9)
PLATELET # BLD AUTO: 324 X10*3/UL (ref 150–450)
POTASSIUM SERPL-SCNC: 4.3 MMOL/L (ref 3.5–5.3)
RBC # BLD AUTO: 4.48 X10*6/UL (ref 4.5–5.9)
SODIUM SERPL-SCNC: 137 MMOL/L (ref 136–145)
WBC # BLD AUTO: 8.7 X10*3/UL (ref 4.4–11.3)

## 2023-12-14 PROCEDURE — 85027 COMPLETE CBC AUTOMATED: CPT | Performed by: NURSE PRACTITIONER

## 2023-12-14 PROCEDURE — 2500000004 HC RX 250 GENERAL PHARMACY W/ HCPCS (ALT 636 FOR OP/ED): Performed by: COLON & RECTAL SURGERY

## 2023-12-14 PROCEDURE — 84100 ASSAY OF PHOSPHORUS: CPT | Performed by: NURSE PRACTITIONER

## 2023-12-14 PROCEDURE — 96372 THER/PROPH/DIAG INJ SC/IM: CPT | Performed by: COLON & RECTAL SURGERY

## 2023-12-14 PROCEDURE — 36415 COLL VENOUS BLD VENIPUNCTURE: CPT | Performed by: NURSE PRACTITIONER

## 2023-12-14 PROCEDURE — 2500000004 HC RX 250 GENERAL PHARMACY W/ HCPCS (ALT 636 FOR OP/ED): Performed by: NURSE PRACTITIONER

## 2023-12-14 PROCEDURE — 2500000001 HC RX 250 WO HCPCS SELF ADMINISTERED DRUGS (ALT 637 FOR MEDICARE OP): Performed by: COLON & RECTAL SURGERY

## 2023-12-14 PROCEDURE — 1100000001 HC PRIVATE ROOM DAILY

## 2023-12-14 PROCEDURE — 99231 SBSQ HOSP IP/OBS SF/LOW 25: CPT | Performed by: NURSE PRACTITIONER

## 2023-12-14 PROCEDURE — 83735 ASSAY OF MAGNESIUM: CPT | Performed by: NURSE PRACTITIONER

## 2023-12-14 PROCEDURE — 80048 BASIC METABOLIC PNL TOTAL CA: CPT | Performed by: NURSE PRACTITIONER

## 2023-12-14 RX ADMIN — DOFETILIDE 250 MCG: 0.25 CAPSULE ORAL at 09:16

## 2023-12-14 RX ADMIN — MORPHINE SULFATE 4 MG: 4 INJECTION, SOLUTION INTRAMUSCULAR; INTRAVENOUS at 05:37

## 2023-12-14 RX ADMIN — LISINOPRIL 20 MG: 20 TABLET ORAL at 09:16

## 2023-12-14 RX ADMIN — MORPHINE SULFATE 4 MG: 4 INJECTION, SOLUTION INTRAMUSCULAR; INTRAVENOUS at 20:57

## 2023-12-14 RX ADMIN — MORPHINE SULFATE 4 MG: 4 INJECTION, SOLUTION INTRAMUSCULAR; INTRAVENOUS at 01:14

## 2023-12-14 RX ADMIN — MORPHINE SULFATE 4 MG: 4 INJECTION, SOLUTION INTRAMUSCULAR; INTRAVENOUS at 09:16

## 2023-12-14 RX ADMIN — SIMVASTATIN 40 MG: 40 TABLET, FILM COATED ORAL at 20:00

## 2023-12-14 RX ADMIN — ENOXAPARIN SODIUM 40 MG: 40 INJECTION SUBCUTANEOUS at 14:53

## 2023-12-14 RX ADMIN — ONDANSETRON 4 MG: 2 INJECTION INTRAMUSCULAR; INTRAVENOUS at 05:37

## 2023-12-14 RX ADMIN — Medication 400 MG: at 09:16

## 2023-12-14 RX ADMIN — DOFETILIDE 250 MCG: 0.25 CAPSULE ORAL at 20:00

## 2023-12-14 RX ADMIN — MORPHINE SULFATE 4 MG: 4 INJECTION, SOLUTION INTRAMUSCULAR; INTRAVENOUS at 14:53

## 2023-12-14 ASSESSMENT — COGNITIVE AND FUNCTIONAL STATUS - GENERAL
DRESSING REGULAR LOWER BODY CLOTHING: A LITTLE
DAILY ACTIVITIY SCORE: 23
MOBILITY SCORE: 24

## 2023-12-14 ASSESSMENT — PAIN SCALES - GENERAL
PAINLEVEL_OUTOF10: 2
PAINLEVEL_OUTOF10: 2
PAINLEVEL_OUTOF10: 6
PAINLEVEL_OUTOF10: 4
PAINLEVEL_OUTOF10: 7

## 2023-12-14 ASSESSMENT — PAIN SCALES - WONG BAKER: WONGBAKER_NUMERICALRESPONSE: HURTS LITTLE BIT

## 2023-12-14 NOTE — CARE PLAN
The patient's goals for the shift include      The clinical goals for the shift include Patient will have pain controlled and remain HD stable    Problem: Pain  Goal: Participates in PT with improved pain control throughout the shift  Outcome: Progressing     Problem: Nutrition  Goal: Less than 5 days NPO/clear liquids  Outcome: Progressing  Goal: Oral intake greater than 50%  Outcome: Progressing  Goal: Oral intake greater 75%  Outcome: Progressing  Goal: Consume prescribed supplement  Outcome: Progressing  Goal: Nutrition support goals are met within 48 hrs  Outcome: Progressing  Goal: Nutrition support is meeting 75% of nutrient needs  Outcome: Progressing  Goal: Tube feed tolerance  Outcome: Progressing  Goal: Reduce weight from edema/fluid  Outcome: Progressing  Goal: Gradual weight gain  Outcome: Progressing  Goal: Improve ostomy output  Outcome: Progressing     Problem: Discharge Planning  Goal: Discharge to home or other facility with appropriate resources  Outcome: Progressing     Pt has remained safe throughout the shift.  Pain has been managed this shift.

## 2023-12-14 NOTE — NURSING NOTE
"Patient has not passed gas. Patient is 2 days post op. Patient c.o. ongoing consistent nausea that becomes worse when eating/drinking. Patient encouraged to only take clears at this time. Was given zofran at 630pm and has not had relief from nausea. Patient refusing to get up to ambulate, stating he did \"too much\" yesterday and is \"really beat up.\" Patient encouraged to take pain medication at this time d/t c.o. pain to abdominal incision. Educated that not managing his pain will make it harder for him to progress as he does not want to ambulate or get out of the bed. Patient BS are hypoactive. Surgical CNP notifed of issues, ordering compazine, did ask her if KUB was warranted at this time d/t 2 days post op abdominal surgery, not passing gas, slow GI mobility and ongoing nausea. Awaiting response   "

## 2023-12-14 NOTE — CARE PLAN
The patient's goals for the shift include      The clinical goals for the shift include Patient will have pain controlled and remain HD stable

## 2023-12-14 NOTE — PROGRESS NOTES
Vic Wise 68 y.o. male    Subjective  Patient seen and examined this morning.  Reports nausea without emesis last evening, has resolved this morning.  Also states that his abdomen was distended, better this morning.  Overall feels better this morning. No flatus or BM as of yet.  Pain controlled.  Urinating well. Up ambulating in hallways.  With no other complaints    Objective  PHYSICAL EXAM:  Physical Exam  Vitals reviewed.   Constitutional:       General: He is awake.      Appearance: Normal appearance.   Cardiovascular:      Rate and Rhythm: Normal rate and regular rhythm.      Pulses: Normal pulses.      Heart sounds: Normal heart sounds.   Pulmonary:      Effort: Pulmonary effort is normal.      Breath sounds: Normal breath sounds and air entry.   Abdominal:      General: Abdomen is flat.      Palpations: Abdomen is soft.      Tenderness: There is no guarding or rebound.      Comments: Abdomen minimally distended, soft.  Abdominal incision with staples intact, well approximated.  Appropriately TTP.    Musculoskeletal:         General: Normal range of motion.      Cervical back: Normal range of motion.   Skin:     General: Skin is warm and dry.   Neurological:      General: No focal deficit present.      Mental Status: He is alert and oriented to person, place, and time.   Psychiatric:         Behavior: Behavior is cooperative.         Vital signs in last 24 hours:  Vitals:    12/14/23 0835   BP: 105/71   Pulse: 86   Resp: 17   Temp: 36.5 °C (97.7 °F)   SpO2: 93%         Intake/Output this shift:    Intake/Output Summary (Last 24 hours) at 12/14/2023 0969  Last data filed at 12/14/2023 0500  Gross per 24 hour   Intake 300 ml   Output 400 ml   Net -100 ml        Allergies:  No Known Allergies     Medications:  Scheduled medications  dofetilide, 250 mcg, oral, BID  enoxaparin, 40 mg, subcutaneous, q24h  lisinopril, 20 mg, oral, Daily  magnesium oxide, 400 mg, oral, Daily  oxygen, , inhalation, Continuous -  02/gases  simvastatin, 40 mg, oral, Nightly      Continuous medications     PRN medications  PRN medications: acetaminophen, morphine, naloxone, ondansetron ODT **OR** ondansetron, oxyCODONE, oxyCODONE, prochlorperazine **OR** prochlorperazine **OR** prochlorperazine       Labs:  Results for orders placed or performed during the hospital encounter of 12/11/23 (from the past 24 hour(s))   Basic Metabolic Panel   Result Value Ref Range    Glucose 164 (H) 74 - 99 mg/dL    Sodium 137 136 - 145 mmol/L    Potassium 4.3 3.5 - 5.3 mmol/L    Chloride 100 98 - 107 mmol/L    Bicarbonate 28 21 - 32 mmol/L    Anion Gap 13 10 - 20 mmol/L    Urea Nitrogen 18 6 - 23 mg/dL    Creatinine 1.18 0.50 - 1.30 mg/dL    eGFR 66 >60 mL/min/1.73m*2    Calcium 9.4 8.6 - 10.3 mg/dL   CBC   Result Value Ref Range    WBC 8.7 4.4 - 11.3 x10*3/uL    nRBC 0.0 0.0 - 0.0 /100 WBCs    RBC 4.48 (L) 4.50 - 5.90 x10*6/uL    Hemoglobin 12.2 (L) 13.5 - 17.5 g/dL    Hematocrit 39.7 (L) 41.0 - 52.0 %    MCV 89 80 - 100 fL    MCH 27.2 26.0 - 34.0 pg    MCHC 30.7 (L) 32.0 - 36.0 g/dL    RDW 15.9 (H) 11.5 - 14.5 %    Platelets 324 150 - 450 x10*3/uL   Magnesium   Result Value Ref Range    Magnesium 1.92 1.60 - 2.40 mg/dL   Phosphorus   Result Value Ref Range    Phosphorus 4.5 2.5 - 4.9 mg/dL        Imaging:  XR abdomen 2 views w chest 1 view    Result Date: 12/14/2023  STUDY: Single View Chest and Abdomen Radiographs;  12/14/2023 at 12:42 AM INDICATION: Ex lap on 12/11 with nausea and pain.  No flatus or bowel movement. COMPARISON: XR chest 7/20/2023, XR chest 7/1/2012. ACCESSION NUMBER(S): [id number] ORDERING CLINICIAN: VIK ONOFRE TECHNIQUE:  Single view of the chest and 3 view(s) of the abdomen. FINDINGS:  CARDIOMEDIASTINAL SILHOUETTE: Cardiomediastinal silhouette is normal in size and configuration.  LUNGS: Diminished inspiration is seen with atelectasis at the right lung base.  ABDOMEN: Dilated air-filled loops of small bowel are seen in the abdomen  and pelvis with air-fluid levels.  Surgical clips are seen along midline in the lower abdomen and pelvis indicating recent operative procedure.  There are no convincing calculi or abnormal calcifications.  BONES: No acute osseous changes    No acute cardiopulmonary disease. Dilated air-filled loops of small bowel in the abdomen and pelvis with air-fluid levels and postoperative changes.  Findings may represent ileus or obstruction in the appropriate clinical setting.  If needed CT should be considered for or definitive characterization. Signed by Ben Wise 68 y.o. male     Subjective  Patient seen and examined this morning.  Denies nausea and vomiting.  No fever or chills.  Tolerating diet.  Urinating well.  No flatus or BM as of yet. Up ambulating.  Pain controlled.  No acute events overnight.     Objective  PHYSICAL EXAM:  Physical Exam  Vitals reviewed.   Constitutional:       General: He is awake.      Appearance: Normal appearance.   Cardiovascular:      Rate and Rhythm: Normal rate and regular rhythm.      Pulses: Normal pulses.      Heart sounds: Normal heart sounds.   Pulmonary:      Effort: Pulmonary effort is normal.      Breath sounds: Normal breath sounds and air entry.   Abdominal:      General: Abdomen is flat. There is no distension (Minimal).      Palpations: Abdomen is soft.      Tenderness: There is no guarding or rebound.      Comments: Abdominal incision with staples intact, C/D/I.  Appropriately TTP.    Musculoskeletal:         General: Normal range of motion.      Cervical back: Normal range of motion.   Skin:     General: Skin is warm and dry.   Neurological:      General: No focal deficit present.      Mental Status: He is alert and oriented to person, place, and time.   Psychiatric:         Behavior: Behavior is cooperative.            Vital signs in last 24 hours:      Vitals:     12/13/23 0700   BP: 143/81   Pulse: 97   Resp: 16   Temp: 36.6 °C (97.9 °F)    SpO2: 95%            Intake/Output this shift:     Intake/Output Summary (Last 24 hours) at 12/13/2023 1029  Last data filed at 12/13/2023 0914      Gross per 24 hour   Intake --   Output 550 ml   Net -550 ml         Allergies:  No Known Allergies      Medications:  Scheduled medications  dofetilide, 250 mcg, oral, BID  enoxaparin, 40 mg, subcutaneous, q24h  lisinopril, 20 mg, oral, Daily  oxygen, , inhalation, Continuous - 02/gases  simvastatin, 40 mg, oral, Nightly        Continuous medications  PRN medications  PRN medications: acetaminophen, morphine, naloxone, ondansetron ODT **OR** ondansetron, oxyCODONE, oxyCODONE        Labs:  Results for orders placed or performed during the hospital encounter of 12/11/23 (from the past 24 hour(s))   Basic Metabolic Panel   Result Value Ref Range    Glucose 164 (H) 74 - 99 mg/dL    Sodium 137 136 - 145 mmol/L    Potassium 4.3 3.5 - 5.3 mmol/L    Chloride 100 98 - 107 mmol/L    Bicarbonate 28 21 - 32 mmol/L    Anion Gap 13 10 - 20 mmol/L    Urea Nitrogen 18 6 - 23 mg/dL    Creatinine 1.18 0.50 - 1.30 mg/dL    eGFR 66 >60 mL/min/1.73m*2    Calcium 9.4 8.6 - 10.3 mg/dL   CBC   Result Value Ref Range    WBC 8.7 4.4 - 11.3 x10*3/uL    nRBC 0.0 0.0 - 0.0 /100 WBCs    RBC 4.48 (L) 4.50 - 5.90 x10*6/uL    Hemoglobin 12.2 (L) 13.5 - 17.5 g/dL    Hematocrit 39.7 (L) 41.0 - 52.0 %    MCV 89 80 - 100 fL    MCH 27.2 26.0 - 34.0 pg    MCHC 30.7 (L) 32.0 - 36.0 g/dL    RDW 15.9 (H) 11.5 - 14.5 %    Platelets 324 150 - 450 x10*3/uL   Magnesium   Result Value Ref Range    Magnesium 1.92 1.60 - 2.40 mg/dL   Phosphorus   Result Value Ref Range    Phosphorus 4.5 2.5 - 4.9 mg/dL          Imaging:  XR abdomen 2 views w chest 1 view    Result Date: 12/14/2023  STUDY: Single View Chest and Abdomen Radiographs;  12/14/2023 at 12:42 AM INDICATION: Ex lap on 12/11 with nausea and pain.  No flatus or bowel movement. COMPARISON: XR chest 7/20/2023, XR chest 7/1/2012. ACCESSION NUMBER(S): [id number]  ORDERING CLINICIAN: VIK ONOFRE TECHNIQUE:  Single view of the chest and 3 view(s) of the abdomen. FINDINGS:  CARDIOMEDIASTINAL SILHOUETTE: Cardiomediastinal silhouette is normal in size and configuration.  LUNGS: Diminished inspiration is seen with atelectasis at the right lung base.  ABDOMEN: Dilated air-filled loops of small bowel are seen in the abdomen and pelvis with air-fluid levels.  Surgical clips are seen along midline in the lower abdomen and pelvis indicating recent operative procedure.  There are no convincing calculi or abnormal calcifications.  BONES: No acute osseous changes    No acute cardiopulmonary disease. Dilated air-filled loops of small bowel in the abdomen and pelvis with air-fluid levels and postoperative changes.  Findings may represent ileus or obstruction in the appropriate clinical setting.  If needed CT should be considered for or definitive characterization. Signed by Ben Jiménez    ECG 12 Lead    Result Date: 11/15/2023  Normal sinus rhythm with first-degree AV block, otherwise normal.       Plan  POD#3: s/p Exploratory Laparotomy with Lysis of adhesions and small bowel resection     #Small bowel obstruction d/t adhesions  - surgery as above  - avss  - CLD - awaiting return of bowel function  - KUB with results as above  - pain control  - Nausea: antiemetics PRN  - DC IVF  - Encourage OOB and IS  - DVT prophylaxis: lovenox 40mg daily     #Atrial fibrillation  - Home tikosyn resumed  - Home eliquis on hold      #HTN  - Home lisinopril resumed      #HLD  - Home simvastatin resumed     #Hypophosphatemia  - WNL     - Daily labs     Plan of care discussed with Dr. Nava.      Alie Sevilla, MICHAEL-CNP    I spent 15 minutes in the professional and overall care of this patient.

## 2023-12-14 NOTE — NURSING NOTE
Surgical CNP did give orders for NPO after seeing xray images. Communicated to patient and educated on current status. Discussed possible inteventions and what to expect. Did discuss the plan of continuing to manage pain to allow for more frequent ambulation with patient. In agreement and verbalized understanding of education. Noted at this time that xrays resulted. Surgical CNP made aware that there is a possible ileus vs. Obstruction, stating NPO is find for now and surgeon to decide further treatment/interventions.

## 2023-12-15 LAB
ANION GAP SERPL CALC-SCNC: 14 MMOL/L (ref 10–20)
BUN SERPL-MCNC: 26 MG/DL (ref 6–23)
CALCIUM SERPL-MCNC: 9.3 MG/DL (ref 8.6–10.3)
CHLORIDE SERPL-SCNC: 99 MMOL/L (ref 98–107)
CO2 SERPL-SCNC: 27 MMOL/L (ref 21–32)
CREAT SERPL-MCNC: 1.27 MG/DL (ref 0.5–1.3)
ERYTHROCYTE [DISTWIDTH] IN BLOOD BY AUTOMATED COUNT: 15.6 % (ref 11.5–14.5)
GFR SERPL CREATININE-BSD FRML MDRD: 61 ML/MIN/1.73M*2
GLUCOSE SERPL-MCNC: 138 MG/DL (ref 74–99)
HCT VFR BLD AUTO: 36.9 % (ref 41–52)
HGB BLD-MCNC: 11.4 G/DL (ref 13.5–17.5)
MAGNESIUM SERPL-MCNC: 2.03 MG/DL (ref 1.6–2.4)
MCH RBC QN AUTO: 26.6 PG (ref 26–34)
MCHC RBC AUTO-ENTMCNC: 30.9 G/DL (ref 32–36)
MCV RBC AUTO: 86 FL (ref 80–100)
NRBC BLD-RTO: 0 /100 WBCS (ref 0–0)
PHOSPHATE SERPL-MCNC: 4.6 MG/DL (ref 2.5–4.9)
PLATELET # BLD AUTO: 313 X10*3/UL (ref 150–450)
POTASSIUM SERPL-SCNC: 4.1 MMOL/L (ref 3.5–5.3)
RBC # BLD AUTO: 4.29 X10*6/UL (ref 4.5–5.9)
SODIUM SERPL-SCNC: 136 MMOL/L (ref 136–145)
WBC # BLD AUTO: 8.2 X10*3/UL (ref 4.4–11.3)

## 2023-12-15 PROCEDURE — 83735 ASSAY OF MAGNESIUM: CPT | Performed by: NURSE PRACTITIONER

## 2023-12-15 PROCEDURE — 2500000004 HC RX 250 GENERAL PHARMACY W/ HCPCS (ALT 636 FOR OP/ED): Performed by: COLON & RECTAL SURGERY

## 2023-12-15 PROCEDURE — 2500000001 HC RX 250 WO HCPCS SELF ADMINISTERED DRUGS (ALT 637 FOR MEDICARE OP)

## 2023-12-15 PROCEDURE — 80048 BASIC METABOLIC PNL TOTAL CA: CPT | Performed by: NURSE PRACTITIONER

## 2023-12-15 PROCEDURE — 36415 COLL VENOUS BLD VENIPUNCTURE: CPT | Performed by: NURSE PRACTITIONER

## 2023-12-15 PROCEDURE — 2500000004 HC RX 250 GENERAL PHARMACY W/ HCPCS (ALT 636 FOR OP/ED): Performed by: NURSE PRACTITIONER

## 2023-12-15 PROCEDURE — 85027 COMPLETE CBC AUTOMATED: CPT | Performed by: NURSE PRACTITIONER

## 2023-12-15 PROCEDURE — 96372 THER/PROPH/DIAG INJ SC/IM: CPT | Performed by: COLON & RECTAL SURGERY

## 2023-12-15 PROCEDURE — 99231 SBSQ HOSP IP/OBS SF/LOW 25: CPT | Performed by: NURSE PRACTITIONER

## 2023-12-15 PROCEDURE — 84100 ASSAY OF PHOSPHORUS: CPT | Performed by: NURSE PRACTITIONER

## 2023-12-15 PROCEDURE — 1100000001 HC PRIVATE ROOM DAILY

## 2023-12-15 PROCEDURE — 2500000001 HC RX 250 WO HCPCS SELF ADMINISTERED DRUGS (ALT 637 FOR MEDICARE OP): Performed by: COLON & RECTAL SURGERY

## 2023-12-15 RX ADMIN — LISINOPRIL 20 MG: 20 TABLET ORAL at 07:39

## 2023-12-15 RX ADMIN — DOFETILIDE 250 MCG: 0.25 CAPSULE ORAL at 22:00

## 2023-12-15 RX ADMIN — MORPHINE SULFATE 4 MG: 4 INJECTION, SOLUTION INTRAMUSCULAR; INTRAVENOUS at 07:35

## 2023-12-15 RX ADMIN — ENOXAPARIN SODIUM 40 MG: 40 INJECTION SUBCUTANEOUS at 20:09

## 2023-12-15 RX ADMIN — OXYCODONE HYDROCHLORIDE 10 MG: 10 TABLET ORAL at 20:09

## 2023-12-15 RX ADMIN — SIMVASTATIN 40 MG: 40 TABLET, FILM COATED ORAL at 20:09

## 2023-12-15 RX ADMIN — DOFETILIDE 250 MCG: 0.25 CAPSULE ORAL at 07:39

## 2023-12-15 RX ADMIN — PROCHLORPERAZINE MALEATE 10 MG: 10 TABLET ORAL at 14:18

## 2023-12-15 RX ADMIN — Medication 400 MG: at 07:39

## 2023-12-15 RX ADMIN — PROCHLORPERAZINE MALEATE 10 MG: 10 TABLET ORAL at 20:09

## 2023-12-15 RX ADMIN — OXYCODONE HYDROCHLORIDE 10 MG: 10 TABLET ORAL at 14:19

## 2023-12-15 ASSESSMENT — COGNITIVE AND FUNCTIONAL STATUS - GENERAL
DAILY ACTIVITIY SCORE: 24
MOBILITY SCORE: 24

## 2023-12-15 ASSESSMENT — PAIN SCALES - GENERAL
PAINLEVEL_OUTOF10: 8
PAINLEVEL_OUTOF10: 7
PAINLEVEL_OUTOF10: 4

## 2023-12-15 ASSESSMENT — PAIN - FUNCTIONAL ASSESSMENT: PAIN_FUNCTIONAL_ASSESSMENT: 0-10

## 2023-12-15 NOTE — PROGRESS NOTES
Vic Wise 68 y.o. male    Subjective  Patient seen and examined this morning.  Reports intermittent nausea, no emesis.  No fever or chills. No flatus or BM as of yet. Urine dark yellow. Belching at times. Up ambulating.  Pain controlled.  No acute events overnight.     Objective  PHYSICAL EXAM:  Physical Exam  Vitals reviewed.   Constitutional:       General: He is awake.      Appearance: Normal appearance.   Cardiovascular:      Rate and Rhythm: Normal rate and regular rhythm.      Pulses: Normal pulses.      Heart sounds: Normal heart sounds.   Pulmonary:      Effort: Pulmonary effort is normal.      Breath sounds: Normal breath sounds and air entry.   Abdominal:      General: Abdomen is flat. There is distension (Minimally).      Palpations: Abdomen is soft.      Tenderness: There is no guarding or rebound.      Comments: Abdomen minimally distended, soft.  Abdominal incision with staples intact, well approximated.  Appropriately TTP.    Musculoskeletal:         General: Normal range of motion.      Cervical back: Normal range of motion.   Skin:     General: Skin is warm and dry.   Neurological:      General: No focal deficit present.      Mental Status: He is alert and oriented to person, place, and time.   Psychiatric:         Behavior: Behavior is cooperative.         Vital signs in last 24 hours:  Vitals:    12/15/23 0314   BP: 118/74   Pulse: 80   Resp: 18   Temp: 37.4 °C (99.3 °F)   SpO2: 96%         Intake/Output this shift:    Intake/Output Summary (Last 24 hours) at 12/15/2023 0921  Last data filed at 12/14/2023 2213  Gross per 24 hour   Intake 0 ml   Output --   Net 0 ml        Allergies:  No Known Allergies     Medications:  Scheduled medications  dofetilide, 250 mcg, oral, BID  enoxaparin, 40 mg, subcutaneous, q24h  lisinopril, 20 mg, oral, Daily  magnesium oxide, 400 mg, oral, Daily  oxygen, , inhalation, Continuous - 02/gases  simvastatin, 40 mg, oral, Nightly      Continuous medications      PRN medications  PRN medications: acetaminophen, morphine, naloxone, ondansetron ODT **OR** ondansetron, oxyCODONE, oxyCODONE, prochlorperazine **OR** prochlorperazine **OR** prochlorperazine       Labs:  Results for orders placed or performed during the hospital encounter of 12/11/23 (from the past 24 hour(s))   CBC   Result Value Ref Range    WBC 8.2 4.4 - 11.3 x10*3/uL    nRBC 0.0 0.0 - 0.0 /100 WBCs    RBC 4.29 (L) 4.50 - 5.90 x10*6/uL    Hemoglobin 11.4 (L) 13.5 - 17.5 g/dL    Hematocrit 36.9 (L) 41.0 - 52.0 %    MCV 86 80 - 100 fL    MCH 26.6 26.0 - 34.0 pg    MCHC 30.9 (L) 32.0 - 36.0 g/dL    RDW 15.6 (H) 11.5 - 14.5 %    Platelets 313 150 - 450 x10*3/uL   Basic Metabolic Panel   Result Value Ref Range    Glucose 138 (H) 74 - 99 mg/dL    Sodium 136 136 - 145 mmol/L    Potassium 4.1 3.5 - 5.3 mmol/L    Chloride 99 98 - 107 mmol/L    Bicarbonate 27 21 - 32 mmol/L    Anion Gap 14 10 - 20 mmol/L    Urea Nitrogen 26 (H) 6 - 23 mg/dL    Creatinine 1.27 0.50 - 1.30 mg/dL    eGFR 61 >60 mL/min/1.73m*2    Calcium 9.3 8.6 - 10.3 mg/dL   Magnesium   Result Value Ref Range    Magnesium 2.03 1.60 - 2.40 mg/dL   Phosphorus   Result Value Ref Range    Phosphorus 4.6 2.5 - 4.9 mg/dL        Imaging:  XR abdomen 2 views w chest 1 view    Result Date: 12/14/2023  STUDY: Single View Chest and Abdomen Radiographs;  12/14/2023 at 12:42 AM INDICATION: Ex lap on 12/11 with nausea and pain.  No flatus or bowel movement. COMPARISON: XR chest 7/20/2023, XR chest 7/1/2012. ACCESSION NUMBER(S): [id number] ORDERING CLINICIAN: VIK C TREFNY TECHNIQUE:  Single view of the chest and 3 view(s) of the abdomen. FINDINGS:  CARDIOMEDIASTINAL SILHOUETTE: Cardiomediastinal silhouette is normal in size and configuration.  LUNGS: Diminished inspiration is seen with atelectasis at the right lung base.  ABDOMEN: Dilated air-filled loops of small bowel are seen in the abdomen and pelvis with air-fluid levels.  Surgical clips are seen along midline  in the lower abdomen and pelvis indicating recent operative procedure.  There are no convincing calculi or abnormal calcifications.  BONES: No acute osseous changes    No acute cardiopulmonary disease. Dilated air-filled loops of small bowel in the abdomen and pelvis with air-fluid levels and postoperative changes.  Findings may represent ileus or obstruction in the appropriate clinical setting.  If needed CT should be considered for or definitive characterization. Signed by Ben Jiménez       Plan  POD#3: s/p Exploratory Laparotomy with Lysis of adhesions and small bowel resection     #Small bowel obstruction d/t adhesions  - surgery as above  - avss  - CLD - awaiting return of bowel function  - KUB with results as above  - pain control  - Nausea: antiemetics PRN  - DC IVF  - Encourage OOB and IS  - DVT prophylaxis: lovenox 40mg daily     #Atrial fibrillation  - Home tikosyn resumed  - Home eliquis on hold      #HTN  - Home lisinopril resumed      #HLD  - Home simvastatin resumed     #Hypophosphatemia  - WNL     - Daily labs     Plan of care discussed with Dr. Nava.       Plan of care discussed and patient seen with MICHAEL Donnelly-CNP    I spent 15 minutes in the professional and overall care of this patient.

## 2023-12-15 NOTE — CARE PLAN
----- Message from SANTINO Pollard sent at 2/5/2017  9:46 AM PST -----  Urine culture is positive.  Please advise patient to complete entire course of antibiotics.  Thank you   The patient's goals for the shift include      The clinical goals for the shift include pain contol      Problem: Pain  Goal: Participates in PT with improved pain control throughout the shift  Outcome: Progressing     Problem: Nutrition  Goal: Less than 5 days NPO/clear liquids  Outcome: Progressing  Goal: Oral intake greater than 50%  Outcome: Progressing  Goal: Oral intake greater 75%  Outcome: Progressing  Goal: Consume prescribed supplement  Outcome: Progressing  Goal: Nutrition support goals are met within 48 hrs  Outcome: Progressing  Goal: Nutrition support is meeting 75% of nutrient needs  Outcome: Progressing  Goal: Tube feed tolerance  Outcome: Progressing  Goal: Reduce weight from edema/fluid  Outcome: Progressing  Goal: Gradual weight gain  Outcome: Progressing  Goal: Improve ostomy output  Outcome: Progressing     Problem: Discharge Planning  Goal: Discharge to home or other facility with appropriate resources  Outcome: Progressing     Problem: Fall/Injury  Goal: Not fall by end of shift  Outcome: Progressing  Goal: Be free from injury by end of the shift  Outcome: Progressing  Goal: Verbalize understanding of personal risk factors for fall in the hospital  Outcome: Progressing     Problem: Skin  Goal: Participates in plan/prevention/treatment measures  Outcome: Progressing  Goal: Prevent/minimize sheer/friction injuries  Outcome: Progressing  Goal: Promote skin healing  Outcome: Progressing

## 2023-12-16 LAB
ERYTHROCYTE [DISTWIDTH] IN BLOOD BY AUTOMATED COUNT: 15.2 % (ref 11.5–14.5)
HCT VFR BLD AUTO: 35.3 % (ref 41–52)
HGB BLD-MCNC: 11.1 G/DL (ref 13.5–17.5)
MAGNESIUM SERPL-MCNC: 2.16 MG/DL (ref 1.6–2.4)
MCH RBC QN AUTO: 27.5 PG (ref 26–34)
MCHC RBC AUTO-ENTMCNC: 31.4 G/DL (ref 32–36)
MCV RBC AUTO: 88 FL (ref 80–100)
NRBC BLD-RTO: 0 /100 WBCS (ref 0–0)
PHOSPHATE SERPL-MCNC: 4.2 MG/DL (ref 2.5–4.9)
PLATELET # BLD AUTO: 306 X10*3/UL (ref 150–450)
RBC # BLD AUTO: 4.03 X10*6/UL (ref 4.5–5.9)
WBC # BLD AUTO: 7.4 X10*3/UL (ref 4.4–11.3)

## 2023-12-16 PROCEDURE — 2500000004 HC RX 250 GENERAL PHARMACY W/ HCPCS (ALT 636 FOR OP/ED): Performed by: NURSE PRACTITIONER

## 2023-12-16 PROCEDURE — 36415 COLL VENOUS BLD VENIPUNCTURE: CPT | Performed by: NURSE PRACTITIONER

## 2023-12-16 PROCEDURE — 2500000004 HC RX 250 GENERAL PHARMACY W/ HCPCS (ALT 636 FOR OP/ED): Performed by: COLON & RECTAL SURGERY

## 2023-12-16 PROCEDURE — 85027 COMPLETE CBC AUTOMATED: CPT | Performed by: NURSE PRACTITIONER

## 2023-12-16 PROCEDURE — 1100000001 HC PRIVATE ROOM DAILY

## 2023-12-16 PROCEDURE — 84100 ASSAY OF PHOSPHORUS: CPT | Performed by: NURSE PRACTITIONER

## 2023-12-16 PROCEDURE — 2500000001 HC RX 250 WO HCPCS SELF ADMINISTERED DRUGS (ALT 637 FOR MEDICARE OP): Performed by: COLON & RECTAL SURGERY

## 2023-12-16 PROCEDURE — 83735 ASSAY OF MAGNESIUM: CPT | Performed by: NURSE PRACTITIONER

## 2023-12-16 PROCEDURE — 96372 THER/PROPH/DIAG INJ SC/IM: CPT | Performed by: COLON & RECTAL SURGERY

## 2023-12-16 RX ADMIN — Medication 400 MG: at 09:09

## 2023-12-16 RX ADMIN — SIMVASTATIN 40 MG: 40 TABLET, FILM COATED ORAL at 22:42

## 2023-12-16 RX ADMIN — OXYCODONE HYDROCHLORIDE 5 MG: 5 TABLET ORAL at 22:41

## 2023-12-16 RX ADMIN — DOFETILIDE 250 MCG: 0.25 CAPSULE ORAL at 09:09

## 2023-12-16 RX ADMIN — OXYCODONE HYDROCHLORIDE 10 MG: 10 TABLET ORAL at 00:24

## 2023-12-16 RX ADMIN — DOFETILIDE 250 MCG: 0.25 CAPSULE ORAL at 22:42

## 2023-12-16 RX ADMIN — ENOXAPARIN SODIUM 40 MG: 40 INJECTION SUBCUTANEOUS at 15:28

## 2023-12-16 RX ADMIN — OXYCODONE HYDROCHLORIDE 10 MG: 10 TABLET ORAL at 04:52

## 2023-12-16 ASSESSMENT — COGNITIVE AND FUNCTIONAL STATUS - GENERAL
MOBILITY SCORE: 24
DAILY ACTIVITIY SCORE: 24

## 2023-12-16 ASSESSMENT — PAIN SCALES - GENERAL
PAINLEVEL_OUTOF10: 3
PAINLEVEL_OUTOF10: 3
PAINLEVEL_OUTOF10: 5 - MODERATE PAIN
PAINLEVEL_OUTOF10: 3
PAINLEVEL_OUTOF10: 0 - NO PAIN
PAINLEVEL_OUTOF10: 6

## 2023-12-16 ASSESSMENT — PAIN - FUNCTIONAL ASSESSMENT
PAIN_FUNCTIONAL_ASSESSMENT: 0-10
PAIN_FUNCTIONAL_ASSESSMENT: 0-10

## 2023-12-16 NOTE — CARE PLAN
The patient's goals for the shift include      The clinical goals for the shift include Pain control    Problem: Pain  Goal: Participates in PT with improved pain control throughout the shift  Outcome: Progressing     Problem: Nutrition  Goal: Less than 5 days NPO/clear liquids  Outcome: Progressing  Goal: Oral intake greater than 50%  Outcome: Progressing  Goal: Oral intake greater 75%  Outcome: Progressing  Goal: Consume prescribed supplement  Outcome: Progressing  Goal: Nutrition support goals are met within 48 hrs  Outcome: Progressing  Goal: Nutrition support is meeting 75% of nutrient needs  Outcome: Progressing  Goal: Tube feed tolerance  Outcome: Progressing  Goal: Reduce weight from edema/fluid  Outcome: Progressing  Goal: Gradual weight gain  Outcome: Progressing  Goal: Improve ostomy output  Outcome: Progressing     Problem: Discharge Planning  Goal: Discharge to home or other facility with appropriate resources  Outcome: Progressing     Problem: Fall/Injury  Goal: Not fall by end of shift  Outcome: Progressing  Goal: Be free from injury by end of the shift  Outcome: Progressing  Goal: Verbalize understanding of personal risk factors for fall in the hospital  Outcome: Progressing   Pt remained safe throughout the shift.

## 2023-12-16 NOTE — PROGRESS NOTES
Nutrition Follow Up Assessment:   Nutrition Assessment         Patient is a 68 y.o. male presenting from home w/wife and adult son for small bowel obstruction d/t adhesions. POD    1 Exploratory Laparotomy with Lysis of adhesions and small bowel resection. Surgery on consult.     12/16/23: follow up note. Pt POD 5. Pt diet advanced to low fiber on 12/12 and made NPO 12/14. Pt later advanced to clear liquids on 12/14 and tolerating a clear liquid diet today. Pt reports feeling much better today. Pt up and walking around today along w/reporting flatus. No BM since admission (last BM 12/10/23).       Past Medical History: afib, HTN, DLD.    has a past medical history of Anemia, Arrhythmia, Cataract, Encounter for general adult medical examination without abnormal findings (10/11/2021), Encounter for general adult medical examination without abnormal findings (05/18/2022), Encounter for general adult medical examination without abnormal findings (10/14/2020), Encounter for immunization (10/14/2020), Encounter for screening for malignant neoplasm of colon (05/18/2022), Encounter for screening for malignant neoplasm of colon (10/14/2020), Encounter for screening for malignant neoplasm of prostate (09/17/2019), Encounter for screening for malignant neoplasm of prostate (05/18/2022), Encounter for screening for other disorder (05/18/2022), GI (gastrointestinal bleed), History of blood transfusion, Hordeolum externum left eye, unspecified eyelid (05/17/2020), Hyperlipidemia, Other specified disorders of eye and adnexa (05/17/2020), Other specified disorders of nose and nasal sinuses (12/14/2020), Personal history of other diseases of the nervous system and sense organs (05/17/2020), and Personal history of other drug therapy (10/11/2021).  Surgical History   has a past surgical history that includes Colon surgery; Back surgery; and Cataract extraction.      Nutrition History:  Energy Intake: Fair 50-75 %  Food and Nutrient  "History: Pt with hx (~14 years ago) of SBR and noted similar symptoms 1 year ago. Pt went to see GI in July of this year and after testing was schedulef for current surgery. Pt reports altering his diet 1 year ago after symptoms began (decreased portions) and then the past 3 months drastically altered his eating d/t intolerance to bread, rice,chips, pizza, etc. Pt tolerated well: eggs, roast beef from [employer] (w/out bun), jello, pudding, applesauce and would drink a lot of decaf iced tea to \"push down\" the food. Pt has been avoiding sugar, caffeine and alcohol d/t ulcers and current symptoms. Pt endorses wife was a nurse and he is confident in his management of current medical situation.  Vitamin/Herbal Supplement Use: Pt recently purchased chocolate Boost (unsure variety)and drank a few of the pre-surgery drinks.  Food Allergies/Intolerances:  None  GI Symptoms:  feeling of \"blockage\" w/certain foods.   Last BM 12/10/23  Oral Problems: None       Anthropometrics:  Height: 180.3 cm (5' 11\")   Weight: 85.7 kg (188 lb 15 oz)   BMI (Calculated): 26.36             Weight History:     Weight Change %:  Weight History / % Weight Change: per archives: 5/18/22 215#, 19/7/22 224#, 7/20/23 215#, 9/21/23 214#, 11/19/23 198#. Pt reports 26# wt loss x 3 months = 8.7% wt loss and archives reflect 7.5% x 3 months. Reports #.  Significant Weight Loss: Yes  Interpretation of Weight Loss: 7.5% in 3 months    Nutrition Focused Physical Exam Findings:    Subcutaneous Fat Loss:   Orbital Fat Pads: Well nourshed (slightly bulging fat pads)  Buccal Fat Pads: Well nourished (full, rounded cheeks)  Triceps: Well nourished (ample fat tissue)  Muscle Wasting:  Temporalis: Well nourished (well-defined muscle)  Pectoralis (Clavicular Region): Mild-Moderate (some protrusion of clavicle)  Quadriceps: Well nourished (well developed, well rounded)  Gastrocnemius: Well nourished (well developed bulbous muscle)  Edema:  Edema: none  Physical " "Findings:  Skin:  (pale, warm, dry, bruising)    Nutrition Significant Labs:  CBC Trend:   Results from last 7 days   Lab Units 12/16/23  0545 12/15/23  0534 12/14/23  0604 12/13/23  0547   WBC AUTO x10*3/uL 7.4 8.2 8.7 10.3   RBC AUTO x10*6/uL 4.03* 4.29* 4.48* 4.18*   HEMOGLOBIN g/dL 11.1* 11.4* 12.2* 11.3*   HEMATOCRIT % 35.3* 36.9* 39.7* 36.1*   MCV fL 88 86 89 86   PLATELETS AUTO x10*3/uL 306 313 324 298    , BMP Trend:   Results from last 7 days   Lab Units 12/15/23  0535 12/14/23  0604 12/13/23  0548 12/12/23  0555   GLUCOSE mg/dL 138* 164* 131* 199*   CALCIUM mg/dL 9.3 9.4 9.1 8.8   SODIUM mmol/L 136 137 138 135*   POTASSIUM mmol/L 4.1 4.3 4.0 4.4   CO2 mmol/L 27 28 27 25   CHLORIDE mmol/L 99 100 103 101   BUN mg/dL 26* 18 15 14   CREATININE mg/dL 1.27 1.18 1.01 1.06    , A1C:No results found for: \"HGBA1C\", BG POCT trend:    , Lipid Panel:   Lab Results   Component Value Date    CHOL 167 09/29/2023    HDL 38.0 (A) 09/29/2023    CHHDL 4.4 09/29/2023    LDLF 94 09/29/2023    VLDL 35 09/29/2023    TRIG 176 (H) 09/29/2023    , Vit D: No results found for: \"VITD25\" , Vit B12: No results found for: \"AHNJEDIP89\"     Nutrition Specific Medications:      I/O:   Last BM Date: 12/10/23;          Dietary Orders (From admission, onward)       Start     Ordered    12/16/23 1223  Oral nutritional supplements  Until discontinued        Comments: Once diet advanced beyond clear liquids.   Question Answer Comment   Deliver with Breakfast    Deliver with Dinner    Select supplement: Ensure High Protein        12/16/23 1223    12/16/23 1223  Oral nutritional supplements  Until discontinued        Comments: TID w/meals while on clear liquid diet.   Question Answer Comment   Deliver with All meals    Select supplement: Ensure Clear        12/16/23 1223    12/14/23 0927  Adult diet Clear Liquid  Diet effective now        Question:  Diet type  Answer:  Clear Liquid    12/14/23 0927                     Estimated Needs:   Total " Energy Estimated Needs (kCal):  (1721-0183 (22-25kcal/kg))     Total Protein Estimated Needs (g):  ( (1.0-1.2g/kg))        Method for Estimating Needs: 1mL/kcal        Nutrition Diagnosis   Malnutrition Diagnosis  Patient has Malnutrition Diagnosis: Yes  Diagnosis Status: Ongoing  Malnutrition Diagnosis: Moderate malnutrition related to acute disease or injury  As Evidenced by: unintentional wt loss of 7.5% x 3 months and pt consuming <75% of estimated energy needs x 3 months.    Nutrition Diagnosis  Patient has Nutrition Diagnosis: Yes  Diagnosis Status (1): Ongoing  Nutrition Diagnosis 1: Altered GI function  Related to (1): SBO due to adhesions.  As Evidenced by (1): POD5 - did not tolerate low fiber diet. Now on Clear liquids. No BM yet.       Nutrition Interventions/Recommendations         Nutrition Prescription:  Individualized Nutrition Prescription Provided for : Diet: advance diet as tolerated/medically appropriate to goal of Low Fiber diet.        Nutrition Interventions:   Food and/or Nutrient Delivery Interventions  Interventions: Medical food supplement, Meals and snacks, Parenteral nutrition/ IV fluids  Meals and Snacks: Fluid-modified diet  Goal: consume 75% of meals.  Parenteral Nutrition/IV Fluids: Other (Comment), Modify concentration of parenteral nutrition (Suggest PPN initiation if unable to advance diet in the next 24-48 hours.)  Formula: Standard peripheral formula AA 4.25%, dextrose 5%  Electrolytes: Standard  Elements: Multivitamin, Trace elements  Continuous Rate (mL/hr): 80 mL/hr  Total Volume (mL/day): 1920 mL/day  Total Parenteral Kcal (kcal/day): 653 kcal/day  Total Protein (g/day): 82 g/day  Blood Glucose Frequency: Every 4 hours  Weight Frequency: Daily  Labs: Renal panel and magnesium daily  Medical Food Supplement: Commercial beverage  Goal: While on clear liquid diet, will provide Ensure Clear (240kcal, 8g pro per 8 oz serving) TID w/meals. Once diet advanced beyond clear  liquids, will provide Ensure HP (160kcal, 16g pro per 8oz serving) at breakfast and dinner meals.         Nutrition Education:   12/12/23: Encouraged PO intakes and reviewed diet progression along w/principles of low fiber diet. Pt denied further ed needs, feels confident in managing. Encouraged small frequent meals, oral nutrition supplements, slowly adding fiber as tolerated and chewing food well.        Nutrition Monitoring and Evaluation   Food/Nutrient Related History Monitoring  Monitoring and Evaluation Plan: Energy intake, Enteral and parenteral nutrition intake  Energy Intake: Estimated energy intake  Criteria: consume 75% of esitmated energy needs. Consume ordered oral nutrition supplements.  Enteral and Parenteral Nutrition Intake: Parenteral nutrition intake, Parenteral nutrition formula/solution  Criteria: nutrition support goals achieved within the next 48 hours.    Body Composition/Growth/Weight History  Monitoring and Evaluation Plan: Weight  Weight: Measured weight  Criteria: maintain stable wt.    Biochemical Data, Medical Tests and Procedures  Monitoring and Evaluation Plan: Glucose/endocrine profile, GI profile  Glucose/Endocrine Profile: Glucose, casual  Criteria: Obtain HgbA1c with goal <7.0%. Glucose values 80-180mg/dL.    Nutrition Focused Physical Findings  Monitoring and Evaluation Plan: Digestive System, Skin  Digestive System: Constipation, Decrease in appetite  Criteria: return of bowel function.  Skin: Other (Comment)  Criteria: promote (incision) wound healing.       Time Spent/Follow-up Reminder:   Time Spent (min): 45 minutes  Follow up: Provided inpatient RDN contact information  Last Date of Nutrition Visit: 12/16/23  Nutrition Follow-Up Needed?: 3-8 days  Follow up Comment: PATI - watch for diet advancement

## 2023-12-16 NOTE — CARE PLAN
Problem: Pain  Goal: Participates in PT with improved pain control throughout the shift  Outcome: Progressing     Problem: Nutrition  Goal: Less than 5 days NPO/clear liquids  Outcome: Progressing  Goal: Oral intake greater than 50%  Outcome: Progressing  Goal: Oral intake greater 75%  Outcome: Progressing  Goal: Consume prescribed supplement  Outcome: Progressing  Goal: Nutrition support goals are met within 48 hrs  Outcome: Progressing  Goal: Nutrition support is meeting 75% of nutrient needs  Outcome: Progressing  Goal: Tube feed tolerance  Outcome: Progressing  Goal: Reduce weight from edema/fluid  Outcome: Progressing  Goal: Gradual weight gain  Outcome: Progressing  Goal: Improve ostomy output  Outcome: Progressing     Problem: Discharge Planning  Goal: Discharge to home or other facility with appropriate resources  Outcome: Progressing     Problem: Fall/Injury  Goal: Not fall by end of shift  Outcome: Progressing  Goal: Be free from injury by end of the shift  Outcome: Progressing  Goal: Verbalize understanding of personal risk factors for fall in the hospital  Outcome: Progressing     Problem: Skin  Goal: Participates in plan/prevention/treatment measures  Outcome: Progressing  Flowsheets (Taken 12/16/2023 0442)  Participates in plan/prevention/treatment measures: Increase activity/out of bed for meals  Goal: Prevent/minimize sheer/friction injuries  Outcome: Progressing  Flowsheets (Taken 12/16/2023 0442)  Prevent/minimize sheer/friction injuries: HOB 30 degrees or less  Goal: Promote skin healing  Outcome: Progressing  Flowsheets (Taken 12/16/2023 0442)  Promote skin healing: Assess skin/pad under line(s)/device(s)   The patient's goals for the shift include      The clinical goals for the shift include Pain control    Patient remained safe this shift. Pain managed with PRN oxycodone. Bowel sounds present but hypoactive. Patient had c/o nausea when pain would increase, PO compazine effective. Patient  independent ambulating in room. Tolerating diet with minimal difficulty.

## 2023-12-17 VITALS
SYSTOLIC BLOOD PRESSURE: 121 MMHG | HEIGHT: 71 IN | HEART RATE: 94 BPM | TEMPERATURE: 97.3 F | DIASTOLIC BLOOD PRESSURE: 58 MMHG | WEIGHT: 189.82 LBS | OXYGEN SATURATION: 97 % | RESPIRATION RATE: 16 BRPM | BODY MASS INDEX: 26.57 KG/M2

## 2023-12-17 LAB
ERYTHROCYTE [DISTWIDTH] IN BLOOD BY AUTOMATED COUNT: 14.8 % (ref 11.5–14.5)
HCT VFR BLD AUTO: 40.4 % (ref 41–52)
HGB BLD-MCNC: 12.9 G/DL (ref 13.5–17.5)
MAGNESIUM SERPL-MCNC: 2.13 MG/DL (ref 1.6–2.4)
MCH RBC QN AUTO: 27.4 PG (ref 26–34)
MCHC RBC AUTO-ENTMCNC: 31.9 G/DL (ref 32–36)
MCV RBC AUTO: 86 FL (ref 80–100)
NRBC BLD-RTO: 0 /100 WBCS (ref 0–0)
PHOSPHATE SERPL-MCNC: 3.1 MG/DL (ref 2.5–4.9)
PLATELET # BLD AUTO: 385 X10*3/UL (ref 150–450)
RBC # BLD AUTO: 4.71 X10*6/UL (ref 4.5–5.9)
WBC # BLD AUTO: 9.3 X10*3/UL (ref 4.4–11.3)

## 2023-12-17 PROCEDURE — 2500000004 HC RX 250 GENERAL PHARMACY W/ HCPCS (ALT 636 FOR OP/ED): Performed by: NURSE PRACTITIONER

## 2023-12-17 PROCEDURE — 85027 COMPLETE CBC AUTOMATED: CPT | Performed by: NURSE PRACTITIONER

## 2023-12-17 PROCEDURE — 36415 COLL VENOUS BLD VENIPUNCTURE: CPT | Performed by: NURSE PRACTITIONER

## 2023-12-17 PROCEDURE — 83735 ASSAY OF MAGNESIUM: CPT | Performed by: NURSE PRACTITIONER

## 2023-12-17 PROCEDURE — 2500000001 HC RX 250 WO HCPCS SELF ADMINISTERED DRUGS (ALT 637 FOR MEDICARE OP): Performed by: COLON & RECTAL SURGERY

## 2023-12-17 PROCEDURE — 84100 ASSAY OF PHOSPHORUS: CPT | Performed by: NURSE PRACTITIONER

## 2023-12-17 RX ORDER — OXYCODONE HYDROCHLORIDE 5 MG/1
5 TABLET ORAL EVERY 4 HOURS PRN
Qty: 5 TABLET | Refills: 0 | Status: SHIPPED | OUTPATIENT
Start: 2023-12-17 | End: 2024-01-05 | Stop reason: ALTCHOICE

## 2023-12-17 RX ADMIN — LISINOPRIL 20 MG: 20 TABLET ORAL at 08:21

## 2023-12-17 RX ADMIN — Medication 400 MG: at 08:21

## 2023-12-17 RX ADMIN — DOFETILIDE 250 MCG: 0.25 CAPSULE ORAL at 08:21

## 2023-12-17 RX ADMIN — APIXABAN 5 MG: 5 TABLET, FILM COATED ORAL at 09:59

## 2023-12-17 ASSESSMENT — PAIN - FUNCTIONAL ASSESSMENT
PAIN_FUNCTIONAL_ASSESSMENT: 0-10
PAIN_FUNCTIONAL_ASSESSMENT: 0-10

## 2023-12-17 ASSESSMENT — PAIN SCALES - GENERAL
PAINLEVEL_OUTOF10: 1
PAINLEVEL_OUTOF10: 0 - NO PAIN

## 2023-12-17 NOTE — CARE PLAN
The patient's goals for the shift include      The clinical goals for the shift include Patient will remain HDS this shift.      Problem: Pain  Goal: Participates in PT with improved pain control throughout the shift  Outcome: Adequate for Discharge     Problem: Nutrition  Goal: Less than 5 days NPO/clear liquids  Outcome: Adequate for Discharge  Goal: Oral intake greater than 50%  Outcome: Adequate for Discharge  Goal: Oral intake greater 75%  Outcome: Adequate for Discharge  Goal: Consume prescribed supplement  Outcome: Adequate for Discharge  Goal: Nutrition support goals are met within 48 hrs  Outcome: Adequate for Discharge  Goal: Nutrition support is meeting 75% of nutrient needs  Outcome: Adequate for Discharge  Goal: Tube feed tolerance  Outcome: Adequate for Discharge  Goal: Reduce weight from edema/fluid  Outcome: Adequate for Discharge  Goal: Gradual weight gain  Outcome: Adequate for Discharge  Goal: Improve ostomy output  Outcome: Adequate for Discharge     Problem: Discharge Planning  Goal: Discharge to home or other facility with appropriate resources  Outcome: Adequate for Discharge   Pt was safely discharged at this time to home.

## 2023-12-17 NOTE — NURSING NOTE
Discharge teaching completed, voiced understanding.  Taken via w/c at this time to private vehicle.

## 2023-12-17 NOTE — DISCHARGE SUMMARY
Discharge Diagnosis  Small bowel obstruction due to adhesions (CMS/HCC)    Issues Requiring Follow-Up  None    Test Results Pending At Discharge  Pending Labs       Order Current Status    Surgical Pathology Exam In process            Hospital Course   Patient was admitted to the regular nursing floor after departing the PACU after undergoing an exploratory laparotomy lysis of adhesions small bowel resection with primary anastomosis.  His postop course was relatively smooth although longer than typical.  Given the patient's past history it was important for him to have bowel function prior to discharge from the hospital.  It took until postop day 5 before he started with BMs and flatus.  During this period of time he was able to tolerate liquid however small amounts.  He has been able to ambulate well with no problems with urination his pain has been well-controlled and in fact the last couple days has not required any significant narcotic utilization.  On discharge he is tolerating solids p.o. and is having bowel function.    Pertinent Physical Exam At Time of Discharge  Physical Exam  Constitutional:       Appearance: Normal appearance.   HENT:      Head: Normocephalic and atraumatic.   Eyes:      Extraocular Movements: Extraocular movements intact.   Cardiovascular:      Rate and Rhythm: Normal rate and regular rhythm.   Pulmonary:      Effort: Pulmonary effort is normal.      Breath sounds: Normal breath sounds.   Abdominal:      General: Abdomen is flat.      Palpations: Abdomen is soft.      Comments: Minimal to no abdominal pain on palpation.  His midline laparotomy incision is without erythema.  Staples are in place.  The wound is clean dry and intact.   Skin:     General: Skin is warm and dry.   Neurological:      General: No focal deficit present.      Mental Status: He is alert and oriented to person, place, and time.   Psychiatric:         Mood and Affect: Mood normal.         Home Medications      Medication List      START taking these medications     oxyCODONE 5 mg immediate release tablet; Commonly known as: Roxicodone;   Take 1 tablet (5 mg) by mouth every 4 hours if needed for moderate pain (4   - 6).     CONTINUE taking these medications     apixaban 5 mg tablet; Commonly known as: Eliquis; Take 1 tablet (5 mg)   by mouth 2 times a day.   chlorhexidine 4 % external liquid; Commonly known as: Hibiclens; Use as   directed daily preoperatively   dofetilide 250 mcg capsule; Commonly known as: Tikosyn; Take 1 capsule   (250 mcg) by mouth 2 times a day.   lisinopril 20 mg tablet; TAKE ONE TABLET BY MOUTH EVERY DAY   magnesium oxide 400 mg (241.3 mg magnesium) tablet; Commonly known as:   Mag-Ox   simvastatin 40 mg tablet; Commonly known as: Zocor; Take 1 tablet (40   mg) by mouth once daily at bedtime.     STOP taking these medications     gabapentin 100 mg capsule; Commonly known as: Neurontin       Outpatient Follow-Up  Future Appointments   Date Time Provider Department Center   5/15/2024  1:15 PM Donell Tirado MD [id number] [place]       Mo Nava MD

## 2023-12-17 NOTE — DISCHARGE INSTRUCTIONS
Call Surgeon if you have:  Temperature greater than 100.4  Persistent nausea and vomiting  Severe uncontrolled pain  Redness, tenderness, or signs of infection (pain, swelling, redness, odor or green/yellow discharge around the site)  Difficulty breathing, headache or visual disturbances  Hives  Persistent dizziness or light-headedness  Extreme fatigue  Any other questions or concerns you may have after discharge    In an emergency, call 911 or go to an Emergency Department at a nearby hospital    It is important to bring a complete, current list of your medications to any medical appointments or hospitalizations.    REMINDER:   Carry a list of your medications and allergies with you at all times  Call your pharmacy at least 1 week in advance to refill prescriptions    Diet: Resume your usual diet. Good nutrition promotes healing. Increase fluid intake.     Supplies & Equipment: None  Education Materials Received: n/a  Belongings Returned: yes  Home medications Returned:yes      I understand and acknowledge receipt of the above instructions.                                                                                                                                          Patient or Guardian Signature                                                         Date/Time                                                                                                                                            Physician's or R.N.'s Signature                                                                  Date/Time      The discharge instructions have been reviewed with the patient and/or Guardian.  Patient and/or Guardian signed and retained a printed copy.

## 2023-12-19 ENCOUNTER — APPOINTMENT (OUTPATIENT)
Dept: PRIMARY CARE | Facility: CLINIC | Age: 68
End: 2023-12-19
Payer: MEDICARE

## 2023-12-19 NOTE — DOCUMENTATION CLARIFICATION NOTE
PATIENT:               ANALI PEDRAZA  ACCT #:                  [id number]  MRN:                       [id number]  :                       1955  ADMIT DATE:       2023 7:55 AM  DISCH DATE:        2023 4:25 PM  RESPONDING PROVIDER #:        [id number]          PROVIDER RESPONSE TEXT:    Moderate Protein Calorie Malnutrition    [facility] QUERY TEXT:    [facility]_Nutrition Diagnosis    Instruction:    Based on your assessment of the patient and the clinical information, please provide the requested documentation by clicking on the appropriate radio button and enter any additional information if prompted.    Question: Please further clarify this patient nutritional status as    When answering this query, please exercise your independent professional judgment. The fact that a question is being asked, does not imply that any particular answer is desired or expected.    The patient's clinical indicators include:  Clinical Information:  68M presents for small bowel obstruction 2/2 adhesions    Clinical Indicators:  - BMI 27.91  - NT, : Moderate malnutrition related to acute disease or injury As Evidenced by: unintentional wt loss of 7.5% x 3 months and pt consuming <75% of estimated energy needs x 3 months.    Treatment:  NT consult, low fiber diet, Ensure HP supplement    Risk Factors:  SBO, recent procedure  Options provided:  -- Moderate Protein Calorie Malnutrition  -- Protein Calorie Malnutrition, Please specify severity  -- Other - I will add my own diagnosis  -- Refer to Clinical Documentation Reviewer    Query created by: Elias Paez on 2023 5:57 PM      Electronically signed by:  DULCE MARIA GAO 2023 7:46 AM

## 2023-12-22 ENCOUNTER — OFFICE VISIT (OUTPATIENT)
Dept: SURGERY | Facility: CLINIC | Age: 68
End: 2023-12-22
Payer: MEDICARE

## 2023-12-22 VITALS
SYSTOLIC BLOOD PRESSURE: 142 MMHG | WEIGHT: 190 LBS | BODY MASS INDEX: 26.6 KG/M2 | HEART RATE: 83 BPM | TEMPERATURE: 98 F | DIASTOLIC BLOOD PRESSURE: 76 MMHG | HEIGHT: 71 IN

## 2023-12-22 DIAGNOSIS — K56.609 SMALL BOWEL OBSTRUCTION (MULTI): Primary | ICD-10-CM

## 2023-12-22 PROCEDURE — 99024 POSTOP FOLLOW-UP VISIT: CPT | Performed by: NURSE PRACTITIONER

## 2023-12-22 PROCEDURE — 1159F MED LIST DOCD IN RCRD: CPT | Performed by: NURSE PRACTITIONER

## 2023-12-22 PROCEDURE — 1126F AMNT PAIN NOTED NONE PRSNT: CPT | Performed by: NURSE PRACTITIONER

## 2023-12-22 PROCEDURE — 1111F DSCHRG MED/CURRENT MED MERGE: CPT | Performed by: NURSE PRACTITIONER

## 2023-12-22 PROCEDURE — 1036F TOBACCO NON-USER: CPT | Performed by: NURSE PRACTITIONER

## 2023-12-22 PROCEDURE — 3078F DIAST BP <80 MM HG: CPT | Performed by: NURSE PRACTITIONER

## 2023-12-22 PROCEDURE — 3008F BODY MASS INDEX DOCD: CPT | Performed by: NURSE PRACTITIONER

## 2023-12-22 PROCEDURE — 3077F SYST BP >= 140 MM HG: CPT | Performed by: NURSE PRACTITIONER

## 2023-12-22 PROCEDURE — 1160F RVW MEDS BY RX/DR IN RCRD: CPT | Performed by: NURSE PRACTITIONER

## 2023-12-22 ASSESSMENT — ENCOUNTER SYMPTOMS
ROS GI COMMENTS: AS NOTED IN HPI
PALPITATIONS: 0
FEVER: 0
APNEA: 0
SHORTNESS OF BREATH: 0
DYSURIA: 0
DIZZINESS: 0
COUGH: 0
LIGHT-HEADEDNESS: 0
ADENOPATHY: 0
HEADACHES: 0
BRUISES/BLEEDS EASILY: 0
CHEST TIGHTNESS: 0
DYSPHORIC MOOD: 0
CHILLS: 0
NERVOUS/ANXIOUS: 0

## 2023-12-22 NOTE — PROGRESS NOTES
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 Did pretty well until about 1 year ago when he started having obstructive type of symptoms which has gotten progressively worse. Had PBJ sandwich on Saturday had severe left sided pain. Finally had a BM Sunday night. HE felt better, not great but better. Pain has continued but he is mainly staying on liquids to full liquids.    Episodes of vomiting.  Lost 18 lbs the last month.  Patient denies any recent travel, no sick contacts.  Denies any history of vascular problems.  He is able to walk significant distances without any evidence of claudication.  He has no episodes of fainting or vision issues to suggest TIAs...     +Cologuard December. Had colonoscopy with Dr. Angeles in January.     7/20/23 CT A/P  IMPRESSION:  1.  Mild dilatation of central loops of small bowel without  transition point or additional findings to indicate obstruction. This  could be related to developing ileus.  2. Trace pelvic free fluid.     8/11/23  IMPRESSION:  1. There is no evidence of bowel obstruction. No suspicious small  bowel lesions identified.  2. Postsurgical changes of small-bowel resection unchanged luminal  narrowing at the anastomosis. Otherwise, the bowel loops are normal  in course and caliber.     11/10/23 Small bowel series   No evidence of bowel obstruction.     He saw Dr. Nava in November. He underwent laparotomy, DENG, SBR on 12/11/23. Pathology is pending.  Feeling much better     Denies any F/C, N/V, CP/SOB, dysuria.   Appetite: good  Energy: improving  Weight: Still down 15+  lbs  BM: 1-2 daily. Normal      Review of Systems   Constitutional:  Negative for chills and fever.   Respiratory:  Negative for apnea, cough, chest tightness and shortness of breath.    Cardiovascular:   "Negative for chest pain, palpitations and leg swelling.   Gastrointestinal:         As noted in HPI   Genitourinary:  Negative for dysuria.   Neurological:  Negative for dizziness, light-headedness and headaches.   Hematological:  Negative for adenopathy. Does not bruise/bleed easily.   Psychiatric/Behavioral:  Negative for dysphoric mood and suicidal ideas. The patient is not nervous/anxious.       Physical Exam  Constitutional:       Appearance: Normal appearance.   HENT:      Head: Normocephalic.   Neck:      Vascular: No carotid bruit.   Cardiovascular:      Rate and Rhythm: Normal rate and regular rhythm.      Pulses: Normal pulses.      Heart sounds: Normal heart sounds.   Pulmonary:      Effort: Pulmonary effort is normal.      Breath sounds: Normal breath sounds.   Abdominal:      Comments: Soft, ND. Appropriately tender. Staples removed in the usual fashion. Steri-strips applied.   Musculoskeletal:      Cervical back: No tenderness.   Lymphadenopathy:      Cervical: No cervical adenopathy.   Neurological:      General: No focal deficit present.      Mental Status: He is alert and oriented to person, place, and time.   Psychiatric:         Mood and Affect: Mood normal.         Behavior: Behavior normal.       Last Recorded Vitals  /76   Pulse 83   Temp 36.7 °C (98 °F)   Ht 1.803 m (5' 11\")   Wt 86.2 kg (190 lb)   BMI 26.50 kg/m²     Assessment:  68M with h/o prior SBR 14 years ago presented with intermittent SBO.  S/p Laparotomy, DENG, SBR on 12/11/23  Doing well post-op    Plan:  Follow up with me in 2 weeks or sooner if any problems  Continue soft diet. No lifting greater than 10 lbs.    Sonia García, APRN-CNP    "

## 2023-12-27 LAB
LABORATORY COMMENT REPORT: NORMAL
PATH REPORT.FINAL DX SPEC: NORMAL
PATH REPORT.GROSS SPEC: NORMAL
PATH REPORT.RELEVANT HX SPEC: NORMAL
PATH REPORT.TOTAL CANCER: NORMAL
RESIDENT REVIEW: NORMAL

## 2023-12-29 ENCOUNTER — APPOINTMENT (OUTPATIENT)
Dept: SURGERY | Facility: CLINIC | Age: 68
End: 2023-12-29
Payer: MEDICARE

## 2024-01-05 ENCOUNTER — OFFICE VISIT (OUTPATIENT)
Dept: SURGERY | Facility: CLINIC | Age: 69
End: 2024-01-05
Payer: MEDICARE

## 2024-01-05 VITALS — SYSTOLIC BLOOD PRESSURE: 158 MMHG | HEART RATE: 78 BPM | DIASTOLIC BLOOD PRESSURE: 82 MMHG | TEMPERATURE: 98.1 F

## 2024-01-05 DIAGNOSIS — K56.50 SMALL BOWEL OBSTRUCTION DUE TO ADHESIONS (MULTI): Primary | ICD-10-CM

## 2024-01-05 PROCEDURE — 3079F DIAST BP 80-89 MM HG: CPT | Performed by: NURSE PRACTITIONER

## 2024-01-05 PROCEDURE — 1160F RVW MEDS BY RX/DR IN RCRD: CPT | Performed by: NURSE PRACTITIONER

## 2024-01-05 PROCEDURE — 1126F AMNT PAIN NOTED NONE PRSNT: CPT | Performed by: NURSE PRACTITIONER

## 2024-01-05 PROCEDURE — 99024 POSTOP FOLLOW-UP VISIT: CPT | Performed by: NURSE PRACTITIONER

## 2024-01-05 PROCEDURE — 1159F MED LIST DOCD IN RCRD: CPT | Performed by: NURSE PRACTITIONER

## 2024-01-05 PROCEDURE — 3008F BODY MASS INDEX DOCD: CPT | Performed by: NURSE PRACTITIONER

## 2024-01-05 PROCEDURE — 1036F TOBACCO NON-USER: CPT | Performed by: NURSE PRACTITIONER

## 2024-01-05 PROCEDURE — 1111F DSCHRG MED/CURRENT MED MERGE: CPT | Performed by: NURSE PRACTITIONER

## 2024-01-05 PROCEDURE — 3077F SYST BP >= 140 MM HG: CPT | Performed by: NURSE PRACTITIONER

## 2024-01-05 ASSESSMENT — ENCOUNTER SYMPTOMS
CHILLS: 0
PALPITATIONS: 0
FEVER: 0
LIGHT-HEADEDNESS: 0
COUGH: 0
ADENOPATHY: 0
DIZZINESS: 0
DYSPHORIC MOOD: 0
HEADACHES: 0
BRUISES/BLEEDS EASILY: 0
CHEST TIGHTNESS: 0
NERVOUS/ANXIOUS: 0
APNEA: 0
SHORTNESS OF BREATH: 0
ROS GI COMMENTS: AS NOTED IN HPI
DYSURIA: 0

## 2024-01-05 NOTE — PROGRESS NOTES
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 Did pretty well until about 1 year ago when he started having obstructive type of symptoms which has gotten progressively worse. Had PBJ sandwich on Saturday had severe left sided pain. Finally had a BM Sunday night. HE felt better, not great but better. Pain has continued but he is mainly staying on liquids to full liquids.    Episodes of vomiting.  Lost 18 lbs the last month.  Patient denies any recent travel, no sick contacts.  Denies any history of vascular problems.  He is able to walk significant distances without any evidence of claudication.  He has no episodes of fainting or vision issues to suggest TIAs...     +Cologuard December. Had colonoscopy with Dr. Angeles in January.     7/20/23 CT A/P  IMPRESSION:  1.  Mild dilatation of central loops of small bowel without  transition point or additional findings to indicate obstruction. This  could be related to developing ileus.  2. Trace pelvic free fluid.     8/11/23  IMPRESSION:  1. There is no evidence of bowel obstruction. No suspicious small  bowel lesions identified.  2. Postsurgical changes of small-bowel resection unchanged luminal  narrowing at the anastomosis. Otherwise, the bowel loops are normal  in course and caliber.     11/10/23 Small bowel series   No evidence of bowel obstruction.     He saw Dr. Nava in November. He underwent laparotomy, DENG, SBR on 12/11/23. Pathology   FINAL DIAGNOSIS   A. SMALL BOWEL /INTESTINE, SEGMENTAL RESECTION:   -- SMALL BOWL WITH ANASTOMOSIS SITE AND REACTIVE CHANGES, SEE NOTE.     Note: The intestinal mucosa indicates crypt architecture distortions and pyloric gland metaplasia. No granuloma or dysplasia is identified.    Electronically signed by Whitley Tolbert MD PhD on  "12/27/2023 at 1447   He was last seen 12/22/23.     Denies any F/C, N/V, CP/SOB, dysuria.   Appetite: good  Energy: improving  Weight: still down but stable.  BM: 1-2 daily. Normal      Review of Systems   Constitutional:  Negative for chills and fever.   Respiratory:  Negative for apnea, cough, chest tightness and shortness of breath.    Cardiovascular:  Negative for chest pain, palpitations and leg swelling.   Gastrointestinal:         As noted in HPI   Genitourinary:  Negative for dysuria.   Neurological:  Negative for dizziness, light-headedness and headaches.   Hematological:  Negative for adenopathy. Does not bruise/bleed easily.   Psychiatric/Behavioral:  Negative for dysphoric mood and suicidal ideas. The patient is not nervous/anxious.       Physical Exam  Constitutional:       Appearance: Normal appearance.   HENT:      Head: Normocephalic.   Pulmonary:      Effort: Pulmonary effort is normal.      Abdominal:      Comments: Soft, ND. Appropriately tender.   Neurological:      General: No focal deficit present.      Mental Status: He is alert and oriented to person, place, and time.   Psychiatric:         Mood and Affect: Mood normal.         Behavior: Behavior normal.       Last Recorded Vitals  /76   Pulse 83   Temp 36.7 °C (98 °F)   Ht 1.803 m (5' 11\")   Wt 86.2 kg (190 lb)   BMI 26.50 kg/m²     Assessment:  68M with h/o prior SBR 14 years ago presented with intermittent SBO.  S/p Laparotomy, DENG, SBR on 12/11/23  Doing well post-op    Plan:  -Diet: OK to slowly advance diet   -Activity: Normal activities can be resumed. No lifting greater than 10 lbs for a full 6 weeks following surgery   -Follow-up with Dr. Nava or me, PRN.  -All questions and concerns were answered. Encouraged to call with any question or concerns.    Sonia García, MICHAEL-CNP  "

## 2024-01-05 NOTE — PROGRESS NOTES
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 Did pretty well until about 1 year ago when he started having obstructive type of symptoms which has gotten progressively worse. Had PBJ sandwich on Saturday had severe left sided pain. Finally had a BM Sunday night. HE felt better, not great but better. Pain has continued but he is mainly staying on liquids to full liquids.    Episodes of vomiting.  Lost 18 lbs the last month.  Patient denies any recent travel, no sick contacts.  Denies any history of vascular problems.  He is able to walk significant distances without any evidence of claudication.  He has no episodes of fainting or vision issues to suggest TIAs...     +Cologuard December. Had colonoscopy with Dr. Angeles in January.     7/20/23 CT A/P  IMPRESSION:  1.  Mild dilatation of central loops of small bowel without  transition point or additional findings to indicate obstruction. This  could be related to developing ileus.  2. Trace pelvic free fluid.     8/11/23  IMPRESSION:  1. There is no evidence of bowel obstruction. No suspicious small  bowel lesions identified.  2. Postsurgical changes of small-bowel resection unchanged luminal  narrowing at the anastomosis. Otherwise, the bowel loops are normal  in course and caliber.     11/10/23 Small bowel series   No evidence of bowel obstruction.     He saw Dr. Nava in November. He underwent laparotomy, DENG, SBR on 12/11/23. Pathology is pending.  Feeling much better     Denies any F/C, N/V, CP/SOB, dysuria.   Appetite: good  Energy: improving  Weight: Still down 15+  lbs  BM: 1-2 daily. Normal      Review of Systems   Constitutional:  Negative for chills and fever.   Respiratory:  Negative for apnea, cough, chest tightness and shortness of breath.    Cardiovascular:   Negative for chest pain, palpitations and leg swelling.   Gastrointestinal:         As noted in HPI   Genitourinary:  Negative for dysuria.   Neurological:  Negative for dizziness, light-headedness and headaches.   Hematological:  Negative for adenopathy. Does not bruise/bleed easily.   Psychiatric/Behavioral:  Negative for dysphoric mood and suicidal ideas. The patient is not nervous/anxious.       Physical Exam  Constitutional:       Appearance: Normal appearance.   HENT:      Head: Normocephalic.   Neck:      Vascular: No carotid bruit.   Cardiovascular:      Rate and Rhythm: Normal rate and regular rhythm.      Pulses: Normal pulses.      Heart sounds: Normal heart sounds.   Pulmonary:      Effort: Pulmonary effort is normal.      Breath sounds: Normal breath sounds.   Abdominal:      Comments: Soft, ND. Appropriately tender. Staples removed in the usual fashion. Steri-strips applied.   Musculoskeletal:      Cervical back: No tenderness.   Lymphadenopathy:      Cervical: No cervical adenopathy.   Neurological:      General: No focal deficit present.      Mental Status: He is alert and oriented to person, place, and time.   Psychiatric:         Mood and Affect: Mood normal.         Behavior: Behavior normal.     Last Recorded Vitals  There were no vitals taken for this visit.    Assessment:  68M with h/o prior SBR 14 years ago presented with intermittent SBO.  S/p Laparotomy, DENG, SBR on 12/11/23  Doing well post-op    Plan:  Follow up with me in 2 weeks or sooner if any problems  Continue soft diet. No lifting greater than 10 lbs.    Sonia García, APRN-CNP

## 2024-01-13 DIAGNOSIS — I48.19 ATRIAL FIBRILLATION, PERSISTENT (MULTI): ICD-10-CM

## 2024-01-15 RX ORDER — APIXABAN 5 MG/1
5 TABLET, FILM COATED ORAL 2 TIMES DAILY
Qty: 180 TABLET | Refills: 1 | Status: SHIPPED | OUTPATIENT
Start: 2024-01-15 | End: 2024-01-29 | Stop reason: SDUPTHER

## 2024-01-26 NOTE — PROGRESS NOTES
"Subjective   Patient ID: Vic Wise is a 68 y.o. male who presents for Medicare Annual Wellness Visit Subsequent, Hypertension, and Hyperlipidemia.  HPI    AWV    Patient presents today for a follow-up on Hypertension, and Hypercholesterolemia. tries follow a low sodium, and low fat diet. Does check his BP at home. Exercises, yes. Had yearly blood work on 7-28-23.     Taking current medications which were reviewed.  Problem list discussed.    Overall doing well.  Eating okay.  Staying active.    Has no other new problem /question.    ROS  Constitutional- No activity change. No appetite change.  Eyes- Denies vision changes.  Respiratory- No shortness of breath.  Cardiovascular- No palpitations. No chest pain.  GI- No nausea or vomiting. No diarrhea or constipation. Denies abdominal pain.  Musculoskeletal- Denies joint swelling.  Extremities- No edema.  Neurological- Denies headaches. Denies dizziness.  Skin- No rashes.  Psychiatric/Behavioral- Denies significant anxiety, or depressed mood.     Objective     /72   Pulse 77   Resp 18   Ht 1.803 m (5' 11\")   Wt 87.7 kg (193 lb 6.4 oz)   SpO2 94%   BMI 26.97 kg/m²     No Known Allergies    Constitutional-- Well-nourished.  No distress  Head- unremarkable.  Eyes- PERRL.  Conjunctiva normal.  Nose- Normal.  No rhinorrhea noted.  Throat- Oropharynx is clear and moist.  Neck- Supple with no thyromegaly.  No significant cervical adenopathy noted.  Pulmonary/Chest- Breath sounds normal with normal effort.  No wheezing.  Heart- Regular rate and rhythm.  No murmur.  Abdomen- Soft and non-tender.  No masses noted.  Musculoskeletal- Normal ROM.  No significant joint swelling  Extremities- No edema.   Neurological- Alert.  No noted deficits.  Skin- Warm.  No rashes.  Psychiatric/Behavioral- Mood and affect normal.  Behavior normal.     Assessment/Plan   1. Medicare annual wellness visit, subsequent        2. Primary hypertension        3. Positive colorectal cancer " screening using Cologuard test  Colonoscopy Screening; Average Risk Patient      4. Hypercholesterolemia  simvastatin (Zocor) 40 mg tablet      5. Paroxysmal atrial fibrillation (CMS/HCC)  dofetilide (Tikosyn) 250 mcg capsule      6. Hypertension, unspecified type  lisinopril 20 mg tablet      7. Atrial fibrillation, persistent (CMS/HCC)  apixaban (Eliquis) 5 mg tablet      8. Chronic kidney disease, stage 3a (CMS/HCC)        9. Primary osteoarthritis involving multiple joints  predniSONE (Deltasone) 10 mg tablet             Long talk. Treatment options reviewed.  Medicare wellness questionnaire reviewed with him in detail.  Home safety issues discussed.  Talked about making sure his wheel was updated    Reviewed most recent lab work with patient. Advised patient to remain up to date on routine maintenance and health screening.      Hypertension controlled  Mild kidney disease stable  Chronic kidney disease controlled. Will continue to monitor renal function.     Osteoarthritis controlled. Educated the patient on osteoarthritis care and management. Educated on muscle strength and exercise.      Maintain appointments with specialists as scheduled.     Continue and take your medications as prescribed.    Health Maintenance issues discussed.    Importance of healthy diet and regular exercise regimen discussed.    We will contact you with any test results ordered. If you do not hear from us, please contact.    Follow-up as instructed or sooner if any problems or symptoms do not resolve as expected.        Scribe Attestation  By signing my name below, INata Scribe   attest that this documentation has been prepared under the direction and in the presence of William Dasilva MD.

## 2024-01-29 ENCOUNTER — OFFICE VISIT (OUTPATIENT)
Dept: PRIMARY CARE | Facility: CLINIC | Age: 69
End: 2024-01-29
Payer: MEDICARE

## 2024-01-29 VITALS
OXYGEN SATURATION: 94 % | DIASTOLIC BLOOD PRESSURE: 72 MMHG | HEART RATE: 77 BPM | HEIGHT: 71 IN | BODY MASS INDEX: 27.07 KG/M2 | WEIGHT: 193.4 LBS | SYSTOLIC BLOOD PRESSURE: 120 MMHG | RESPIRATION RATE: 18 BRPM

## 2024-01-29 DIAGNOSIS — M15.9 PRIMARY OSTEOARTHRITIS INVOLVING MULTIPLE JOINTS: ICD-10-CM

## 2024-01-29 DIAGNOSIS — N18.31 CHRONIC KIDNEY DISEASE, STAGE 3A (MULTI): ICD-10-CM

## 2024-01-29 DIAGNOSIS — I10 HYPERTENSION, UNSPECIFIED TYPE: ICD-10-CM

## 2024-01-29 DIAGNOSIS — Z00.00 MEDICARE ANNUAL WELLNESS VISIT, SUBSEQUENT: Primary | ICD-10-CM

## 2024-01-29 DIAGNOSIS — E78.00 HYPERCHOLESTEROLEMIA: ICD-10-CM

## 2024-01-29 DIAGNOSIS — I10 PRIMARY HYPERTENSION: ICD-10-CM

## 2024-01-29 DIAGNOSIS — I48.19 ATRIAL FIBRILLATION, PERSISTENT (MULTI): ICD-10-CM

## 2024-01-29 DIAGNOSIS — R19.5 POSITIVE COLORECTAL CANCER SCREENING USING COLOGUARD TEST: ICD-10-CM

## 2024-01-29 DIAGNOSIS — I48.0 PAROXYSMAL ATRIAL FIBRILLATION (MULTI): ICD-10-CM

## 2024-01-29 PROCEDURE — 1159F MED LIST DOCD IN RCRD: CPT | Performed by: FAMILY MEDICINE

## 2024-01-29 PROCEDURE — 1170F FXNL STATUS ASSESSED: CPT | Performed by: FAMILY MEDICINE

## 2024-01-29 PROCEDURE — 1126F AMNT PAIN NOTED NONE PRSNT: CPT | Performed by: FAMILY MEDICINE

## 2024-01-29 PROCEDURE — 3074F SYST BP LT 130 MM HG: CPT | Performed by: FAMILY MEDICINE

## 2024-01-29 PROCEDURE — 1157F ADVNC CARE PLAN IN RCRD: CPT | Performed by: FAMILY MEDICINE

## 2024-01-29 PROCEDURE — 99213 OFFICE O/P EST LOW 20 MIN: CPT | Performed by: FAMILY MEDICINE

## 2024-01-29 PROCEDURE — 3078F DIAST BP <80 MM HG: CPT | Performed by: FAMILY MEDICINE

## 2024-01-29 PROCEDURE — 1160F RVW MEDS BY RX/DR IN RCRD: CPT | Performed by: FAMILY MEDICINE

## 2024-01-29 PROCEDURE — G0439 PPPS, SUBSEQ VISIT: HCPCS | Performed by: FAMILY MEDICINE

## 2024-01-29 PROCEDURE — 3008F BODY MASS INDEX DOCD: CPT | Performed by: FAMILY MEDICINE

## 2024-01-29 PROCEDURE — 1036F TOBACCO NON-USER: CPT | Performed by: FAMILY MEDICINE

## 2024-01-29 RX ORDER — DOFETILIDE 0.25 MG/1
250 CAPSULE ORAL 2 TIMES DAILY
Qty: 180 CAPSULE | Refills: 3 | Status: SHIPPED | OUTPATIENT
Start: 2024-01-29 | End: 2024-05-15 | Stop reason: SDUPTHER

## 2024-01-29 RX ORDER — SIMVASTATIN 40 MG/1
40 TABLET, FILM COATED ORAL NIGHTLY
Qty: 90 TABLET | Refills: 3 | Status: SHIPPED | OUTPATIENT
Start: 2024-01-29

## 2024-01-29 RX ORDER — PREDNISONE 10 MG/1
TABLET ORAL
Qty: 24 TABLET | Refills: 0 | Status: SHIPPED
Start: 2024-01-29 | End: 2024-05-31 | Stop reason: WASHOUT

## 2024-01-29 RX ORDER — LISINOPRIL 20 MG/1
20 TABLET ORAL DAILY
Qty: 90 TABLET | Refills: 3 | Status: SHIPPED | OUTPATIENT
Start: 2024-01-29

## 2024-01-29 ASSESSMENT — ACTIVITIES OF DAILY LIVING (ADL)
DOING_HOUSEWORK: INDEPENDENT
TAKING_MEDICATION: INDEPENDENT
BATHING: INDEPENDENT
GROCERY_SHOPPING: INDEPENDENT
MANAGING_FINANCES: INDEPENDENT
DRESSING: INDEPENDENT

## 2024-05-15 ENCOUNTER — OFFICE VISIT (OUTPATIENT)
Dept: CARDIOLOGY | Facility: CLINIC | Age: 69
End: 2024-05-15
Payer: MEDICARE

## 2024-05-15 VITALS
HEIGHT: 71 IN | WEIGHT: 196.6 LBS | BODY MASS INDEX: 27.52 KG/M2 | DIASTOLIC BLOOD PRESSURE: 51 MMHG | HEART RATE: 91 BPM | SYSTOLIC BLOOD PRESSURE: 112 MMHG

## 2024-05-15 DIAGNOSIS — Z79.01 CHRONIC ANTICOAGULATION: ICD-10-CM

## 2024-05-15 DIAGNOSIS — I25.10 CORONARY ARTERY CALCIFICATION SEEN ON CAT SCAN: Primary | ICD-10-CM

## 2024-05-15 DIAGNOSIS — E78.00 HYPERCHOLESTEROLEMIA: ICD-10-CM

## 2024-05-15 DIAGNOSIS — I10 PRIMARY HYPERTENSION: ICD-10-CM

## 2024-05-15 DIAGNOSIS — I48.0 PAROXYSMAL ATRIAL FIBRILLATION (MULTI): ICD-10-CM

## 2024-05-15 DIAGNOSIS — G47.33 OSA (OBSTRUCTIVE SLEEP APNEA): ICD-10-CM

## 2024-05-15 DIAGNOSIS — I49.5 SINUS NODE DYSFUNCTION (MULTI): ICD-10-CM

## 2024-05-15 DIAGNOSIS — I34.0 NONRHEUMATIC MITRAL VALVE REGURGITATION: ICD-10-CM

## 2024-05-15 PROCEDURE — 1160F RVW MEDS BY RX/DR IN RCRD: CPT | Performed by: INTERNAL MEDICINE

## 2024-05-15 PROCEDURE — 3008F BODY MASS INDEX DOCD: CPT | Performed by: INTERNAL MEDICINE

## 2024-05-15 PROCEDURE — 3074F SYST BP LT 130 MM HG: CPT | Performed by: INTERNAL MEDICINE

## 2024-05-15 PROCEDURE — 93000 ELECTROCARDIOGRAM COMPLETE: CPT | Performed by: INTERNAL MEDICINE

## 2024-05-15 PROCEDURE — 99214 OFFICE O/P EST MOD 30 MIN: CPT | Performed by: INTERNAL MEDICINE

## 2024-05-15 PROCEDURE — 1036F TOBACCO NON-USER: CPT | Performed by: INTERNAL MEDICINE

## 2024-05-15 PROCEDURE — 3078F DIAST BP <80 MM HG: CPT | Performed by: INTERNAL MEDICINE

## 2024-05-15 PROCEDURE — 1157F ADVNC CARE PLAN IN RCRD: CPT | Performed by: INTERNAL MEDICINE

## 2024-05-15 PROCEDURE — 1159F MED LIST DOCD IN RCRD: CPT | Performed by: INTERNAL MEDICINE

## 2024-05-15 RX ORDER — DOFETILIDE 0.25 MG/1
250 CAPSULE ORAL 2 TIMES DAILY
Qty: 180 CAPSULE | Refills: 3 | Status: SHIPPED | OUTPATIENT
Start: 2024-05-15

## 2024-05-15 NOTE — PROGRESS NOTES
Patient:  Vic Wise  YOB: 1955  MRN: [id number]       HPI:       Vic Wise is a 68 y.o. male who returns today for cardiac follow-up.  He was seen on December 28, 2022 for newly diagnosed atrial fibrillation. He does not have any history of atherosclerotic heart or valvular heart disease. He was diagnosed with hypertension more than 20 years ago. His blood pressures have been well controlled with lisinopril. He has hyperlipidemia for which he takes simvastatin. No history of diabetes mellitus. He has never smoked. He has a history of some iron deficiency anemia related to hemorrhoids.     He was noted during a routine office visit on December 7, 2022 with Dr. Dasilva to have atrial fibrillation with a heart rate of 78 bpm. No significant ST changes. He had been in the office 6 months prior to that and had a regular rhythm. He was essentially asymptomatic. He noted a very brief episode of palpitations on [employer]onal[employer] Day. He was started on Eliquis 5 mg twice daily.  He has a [id number] score of 2.  His heart rates were controlled without any negative chronotropic agents suggesting an element of underlying conduction disease.      An echocardiogram on January 25, 2023 showed normal LV systolic function. Estimated LV ejection fraction of 60 to 65%. There was mild RV enlargement with normal RV systolic function. Mild left atrial enlargement. 2+ mitral regurgitation and 1+ tricuspid regurgitation. Estimated RVSP 44 mmHg. Very similar to previous study in 2015. An exercise treadmill test on January 25, 2020 was negative for significant ST changes. He achieved 4.6 METS and 104% of maximally predicted heart rate. Resting heart rate was 99 bpm with rapid increase to 139 bpm. A 24-hour Holter monitor on January 24, 2023 showed atrial fibrillation throughout with an average heart rate 79 bpm. Heart rates ranged from 36 bpm in the early morning hours to a maximal heart rate of 141 beats minute. He had 186 pauses  of greater than 2 seconds. The overwhelming majority occurred during sleeping hours. Longest pause was 3.1 seconds. Lab studies dated December 28, 2022 showed a normal CBC and CMP except for glucose 129. TSH was normal. Magnesium level was 1.74.      He underwent external electrocardioversion to normal sinus rhythm by Dr. Abraham on February 24, 2023. He was noted on a follow-up visit with Dr. Abraham on March 14, 2023 to be back in atrial fibrillation. Heart rate 61 bpm. Dr. Abraham recommended admission for drug loading with dofetilide due to underlying sinus node dysfunction. He was admitted April 4, 2023 and started on dofetilide to 500 mcg twice daily. In the early morning hours of April 5, 2023 he converted to normal sinus rhythm. He was noted to have low magnesium levels and was give IV Mg Sulfate and was started on Mg Oxide.      He had a partial small bowel resection approximately 14 years ago.  He developed progressively worsening abdominal discomfort and was diagnosed with a partial small bowel obstruction secondary to adhesions.  He underwent exploratory laparotomy and lysis of adhesions on December 11, 2023.  He states the surgery took a toll on him and it took him several weeks to fully recover.  He generally has been doing from a cardiac standpoint.  He denies any chest pain or shortness of breath. He denies any orthopnea, PND, or increasing peripheral edema. He denies any lightheadedness, near-syncope, or syncope. He denies any fever, chillekgs, or cough. He denies any nausea, vomiting, or diaphoresis. He denies any hemoptysis, hematemesis, melena, or hematochezia. His EKG in the office today shows NSR with first-degree AV block.  Rare PACs.  Corrected QT interval 448 with ms.  Lab studies dated December 15, 2023 showed a normal CBC with exception of hemoglobin 11.4 and hematocrit 36.9.  Basic metabolic profile was normal with exception of BUN 26 and glucose 138.  Lipid profile September 29, 2023 showed a  cholesterol 167 with HDL 38, LDL 94, triglycerides 176.   He remains in normal sinus rhythm.  His blood pressures are well-controlled.  He does not have any anginal or CHF symptoms.  We discussed the importance of continuing long-term anticoagulation with Eliquis.  Other details as noted below.     The above portion of this note was dictated by me using voice recognition software. I personally performed the services described in the documentation. The scribe entering the documentation below was in my presence. I affirm that the information is both accurate and complete.       Objective:     Vitals:    05/15/24 1311   BP: 112/51   Pulse: 91       Wt Readings from Last 4 Encounters:   05/15/24 89.2 kg (196 lb 9.6 oz)   01/29/24 87.7 kg (193 lb 6.4 oz)   12/22/23 86.2 kg (190 lb)   12/17/23 86.1 kg (189 lb 13.1 oz)       Allergies:     No Known Allergies     Medications:     Current Outpatient Medications   Medication Instructions    apixaban (ELIQUIS) 5 mg, oral, 2 times daily    dofetilide (TIKOSYN) 250 mcg, oral, 2 times daily    lisinopril 20 mg, oral, Daily    magnesium oxide (Mag-Ox) 400 mg (241.3 mg magnesium) tablet 1 tablet, oral, Daily    predniSONE (Deltasone) 10 mg tablet TID FOR 5 DAYS THEN BID FOR 3 DAYS THEN 1 DAILY    simvastatin (ZOCOR) 40 mg, oral, Nightly       Physical Examination:   GENERAL:  Well developed, well nourished, in no acute distress.  CHEST:  Symmetric and nontender.  NEURO/PSYCH:  Alert and oriented times three with approppriate behavior and responses.  NECK:  Supple, no JVD, no bruit.  LUNGS:  Clear to auscultation bilaterally, normal respiratory effort.  HEART:  Rate and rhythm regular with no evident murmur, no gallop appreciated.        There are no rubs, clicks or heaves.  EXTREMITIES:  Warm with good color, no clubbing or cyanosis.  There is no edema noted.  PERIPHERAL VASCULAR:  Pulses present and equally palpable; 2+ throughout.      Lab:     CBC:   Lab Results   Component  Value Date    WBC 9.3 12/17/2023    RBC 4.71 12/17/2023    HGB 12.9 (L) 12/17/2023    HCT 40.4 (L) 12/17/2023     12/17/2023        CMP:    Lab Results   Component Value Date     12/15/2023    K 4.1 12/15/2023    CL 99 12/15/2023    CO2 27 12/15/2023    BUN 26 (H) 12/15/2023    CREATININE 1.27 12/15/2023    GLUCOSE 138 (H) 12/15/2023    CALCIUM 9.3 12/15/2023       Magnesium:    Lab Results   Component Value Date    MG 2.13 12/17/2023       Lipid Profile:    Lab Results   Component Value Date    TRIG 176 (H) 09/29/2023    HDL 38.0 (A) 09/29/2023       TSH:    Lab Results   Component Value Date    TSH 1.21 04/04/2023       PT/INR:    Lab Results   Component Value Date    PROTIME 12.9 (H) 11/28/2023    INR 1.1 11/28/2023       BMP:  Lab Results   Component Value Date     12/15/2023     12/14/2023     12/13/2023    K 4.1 12/15/2023    K 4.3 12/14/2023    K 4.0 12/13/2023    CL 99 12/15/2023     12/14/2023     12/13/2023    CO2 27 12/15/2023    CO2 28 12/14/2023    CO2 27 12/13/2023    BUN 26 (H) 12/15/2023    BUN 18 12/14/2023    BUN 15 12/13/2023    CREATININE 1.27 12/15/2023    CREATININE 1.18 12/14/2023    CREATININE 1.01 12/13/2023       CBC:  Lab Results   Component Value Date    WBC 9.3 12/17/2023    WBC 7.4 12/16/2023    WBC 8.2 12/15/2023    RBC 4.71 12/17/2023    RBC 4.03 (L) 12/16/2023    RBC 4.29 (L) 12/15/2023    HGB 12.9 (L) 12/17/2023    HGB 11.1 (L) 12/16/2023    HGB 11.4 (L) 12/15/2023    HCT 40.4 (L) 12/17/2023    HCT 35.3 (L) 12/16/2023    HCT 36.9 (L) 12/15/2023    MCV 86 12/17/2023    MCV 88 12/16/2023    MCV 86 12/15/2023    MCH 27.4 12/17/2023    MCH 27.5 12/16/2023    MCH 26.6 12/15/2023    MCHC 31.9 (L) 12/17/2023    MCHC 31.4 (L) 12/16/2023    MCHC 30.9 (L) 12/15/2023    RDW 14.8 (H) 12/17/2023    RDW 15.2 (H) 12/16/2023    RDW 15.6 (H) 12/15/2023     12/17/2023     12/16/2023     12/15/2023    MPV 10.0 10/24/2023       Cardiac  Enzymes:    Lab Results   Component Value Date    TROPHS 6 07/20/2023    TROPHS 7 07/20/2023       Hepatic Function Panel:    Lab Results   Component Value Date    ALKPHOS 76 11/09/2023    ALT 14 11/09/2023    AST 14 11/09/2023    PROT 7.1 11/09/2023    BILITOT 0.7 11/09/2023       Problem List:     Patient Active Problem List   Diagnosis    Abnormal EKG    Arthritis    BPH (benign prostatic hyperplasia)    Chronic kidney disease, stage 3a (Multi)    Eczema    Fatigue    HTN (hypertension)    Hypercholesterolemia    Irregular heart beat    Low iron    Lower back pain    Nonrheumatic mitral valve regurgitation    Paroxysmal atrial fibrillation (Multi)    Positive colorectal cancer screening using Cologuard test    Primary osteoarthritis involving multiple joints    Psoriasis    Red blood cell abnormality    Sinus node dysfunction (Multi)    Snoring    Abdominal pain    Abnormal echocardiogram    Coronary artery calcification seen on CAT scan    SERGIO (obstructive sleep apnea)    Peripheral neuropathy    Small bowel obstruction (Multi)    Essential hypertension    Hyperlipidemia    Chronic back pain    Atrial fibrillation (Multi)    Chronic anticoagulation    Class 1 obesity with body mass index (BMI) of 30.0 to 30.9 in adult    High risk medication use    Small bowel obstruction due to adhesions (Multi)       Asessment:     Problem List Items Addressed This Visit             ICD-10-CM    HTN (hypertension) I10    Relevant Orders    CBC    Comprehensive metabolic panel    Lipid panel    Follow Up In Cardiology    Hypercholesterolemia E78.00    Relevant Orders    CBC    Comprehensive metabolic panel    Lipid panel    Follow Up In Cardiology    Nonrheumatic mitral valve regurgitation I34.0    Relevant Orders    Follow Up In Cardiology    Paroxysmal atrial fibrillation (Multi) I48.0    Relevant Medications    apixaban (Eliquis) 5 mg tablet    dofetilide (Tikosyn) 250 mcg capsule    Other Relevant Orders    CBC     Comprehensive metabolic panel    Lipid panel    Follow Up In Cardiology    Sinus node dysfunction (Multi) I49.5    Relevant Orders    Follow Up In Cardiology    Coronary artery calcification seen on CAT scan - Primary I25.10    SERGIO (obstructive sleep apnea) G47.33    Chronic anticoagulation Z79.01

## 2024-05-30 NOTE — PROGRESS NOTES
Chief Compliant:  FUV abdominal bulge    History Of Present Illness  Vic Wise is a 68M with remote SBR about 14 years ago (9/28/10 Pathology: Partial excision of ileum-active enteritis with two ulcerations, clinically strictures-one reactive lymph node).    +Cologuard December. Had colonoscopy with Dr. Angeles in January.     7/20/23 CT A/P  IMPRESSION:  1.  Mild dilatation of central loops of small bowel without  transition point or additional findings to indicate obstruction. This  could be related to developing ileus.  2. Trace pelvic free fluid.     8/11/23  IMPRESSION:  1. There is no evidence of bowel obstruction. No suspicious small  bowel lesions identified.  2. Postsurgical changes of small-bowel resection unchanged luminal  narrowing at the anastomosis. Otherwise, the bowel loops are normal  in course and caliber.     11/10/23 Small bowel series   No evidence of bowel obstruction.     He saw Dr. Nava in November. He underwent laparotomy, DEGN, SBR on 12/11/23. Pathology   FINAL DIAGNOSIS   A. SMALL BOWEL /INTESTINE, SEGMENTAL RESECTION:   -- SMALL BOWL WITH ANASTOMOSIS SITE AND REACTIVE CHANGES, SEE NOTE.     Note: The intestinal mucosa indicates crypt architecture distortions and pyloric gland metaplasia. No granuloma or dysplasia is identified.    Electronically signed by Whitley Tolbert MD PhD on 12/27/2023 at 1447   He was last seen January 2024    He returns today with c/o abdominal bulge  He noticed it a few weeks ago. No pain. No change in bowel habits.     Denies any F/C, N/V, CP/SOB, dysuria.   Appetite: good  Energy: good  Weight: stable  BM: 1-3 times daily. No diarrhea or constipation.    Review of Systems   Constitutional:  Negative for chills and fever.   Respiratory:  Negative for apnea, cough, chest tightness and shortness of breath.    Cardiovascular:  Negative for chest pain, palpitations and leg swelling.   Gastrointestinal:         As noted in HPI   Genitourinary:  Negative for  "dysuria.   Neurological:  Negative for dizziness, light-headedness and headaches.   Hematological:  Negative for adenopathy. Does not bruise/bleed easily.   Psychiatric/Behavioral:  Negative for dysphoric mood and suicidal ideas. The patient is not nervous/anxious.       Physical Exam  Constitutional:       Appearance: Normal appearance.   HENT:      Head: Normocephalic.   Pulmonary:      Effort: Pulmonary effort is normal.      Abdominal:      Comments: Soft, NT, ND. Midline incision is healed. Ventral hernia at the top of his incision.  Neurological:      General: No focal deficit present.      Mental Status: He is alert and oriented to person, place, and time.   Psychiatric:         Mood and Affect: Mood normal.         Behavior: Behavior normal.     Last Recorded Vitals  /69   Pulse 78   Temp 36.6 °C (97.9 °F)   Ht 1.803 m (5' 11\")   Wt 90.3 kg (199 lb)   BMI 27.75 kg/m²       Assessment:  68M with h/o prior SBR 14 years ago presented with intermittent SBO.  S/p Laparotomy, DENG, SBR on 12/11/23  Asymptomatic incisional hernia    Plan:  -Reviewed worrisome signs and symptoms: nausea, vomiting, change in bowel habits and/or constipation, redness or pain at hernia site. If any of these happens, please call the office ASAP or come to the ED.  -Discussed CT scan to confirm. He wants to hold off for now as he is feeling fine  -Fu with me SLIM García, APRN-CNP  "

## 2024-05-31 ENCOUNTER — OFFICE VISIT (OUTPATIENT)
Dept: SURGERY | Facility: CLINIC | Age: 69
End: 2024-05-31
Payer: MEDICARE

## 2024-05-31 VITALS
WEIGHT: 199 LBS | HEIGHT: 71 IN | BODY MASS INDEX: 27.86 KG/M2 | TEMPERATURE: 97.9 F | HEART RATE: 78 BPM | DIASTOLIC BLOOD PRESSURE: 69 MMHG | SYSTOLIC BLOOD PRESSURE: 144 MMHG

## 2024-05-31 DIAGNOSIS — K43.9 VENTRAL HERNIA WITHOUT OBSTRUCTION OR GANGRENE: Primary | ICD-10-CM

## 2024-05-31 PROCEDURE — 3077F SYST BP >= 140 MM HG: CPT | Performed by: NURSE PRACTITIONER

## 2024-05-31 PROCEDURE — 3078F DIAST BP <80 MM HG: CPT | Performed by: NURSE PRACTITIONER

## 2024-05-31 PROCEDURE — 1157F ADVNC CARE PLAN IN RCRD: CPT | Performed by: NURSE PRACTITIONER

## 2024-05-31 PROCEDURE — 99213 OFFICE O/P EST LOW 20 MIN: CPT | Performed by: NURSE PRACTITIONER

## 2024-05-31 PROCEDURE — 1160F RVW MEDS BY RX/DR IN RCRD: CPT | Performed by: NURSE PRACTITIONER

## 2024-05-31 PROCEDURE — 1159F MED LIST DOCD IN RCRD: CPT | Performed by: NURSE PRACTITIONER

## 2024-05-31 PROCEDURE — 3008F BODY MASS INDEX DOCD: CPT | Performed by: NURSE PRACTITIONER

## 2024-07-10 DIAGNOSIS — I10 HYPERTENSION, UNSPECIFIED TYPE: ICD-10-CM

## 2024-07-10 RX ORDER — LISINOPRIL 20 MG/1
20 TABLET ORAL DAILY
Qty: 30 TABLET | Refills: 0 | Status: SHIPPED | OUTPATIENT
Start: 2024-07-10

## 2024-08-02 DIAGNOSIS — I10 HYPERTENSION, UNSPECIFIED TYPE: ICD-10-CM

## 2024-08-05 RX ORDER — LISINOPRIL 20 MG/1
20 TABLET ORAL DAILY
Qty: 30 TABLET | Refills: 0 | Status: SHIPPED | OUTPATIENT
Start: 2024-08-05

## 2024-10-11 DIAGNOSIS — I10 HYPERTENSION, UNSPECIFIED TYPE: ICD-10-CM

## 2024-10-14 RX ORDER — LISINOPRIL 20 MG/1
20 TABLET ORAL DAILY
Qty: 30 TABLET | Refills: 0 | Status: SHIPPED | OUTPATIENT
Start: 2024-10-14 | End: 2024-10-15 | Stop reason: SDUPTHER

## 2024-10-15 ENCOUNTER — APPOINTMENT (OUTPATIENT)
Dept: PRIMARY CARE | Facility: CLINIC | Age: 69
End: 2024-10-15
Payer: MEDICARE

## 2024-10-15 VITALS
HEART RATE: 68 BPM | RESPIRATION RATE: 16 BRPM | DIASTOLIC BLOOD PRESSURE: 64 MMHG | OXYGEN SATURATION: 98 % | SYSTOLIC BLOOD PRESSURE: 130 MMHG | BODY MASS INDEX: 29.54 KG/M2 | TEMPERATURE: 97.1 F | HEIGHT: 71 IN | WEIGHT: 211 LBS

## 2024-10-15 DIAGNOSIS — I10 PRIMARY HYPERTENSION: Primary | ICD-10-CM

## 2024-10-15 DIAGNOSIS — I10 HYPERTENSION, UNSPECIFIED TYPE: ICD-10-CM

## 2024-10-15 DIAGNOSIS — Z12.5 PROSTATE CANCER SCREENING: ICD-10-CM

## 2024-10-15 DIAGNOSIS — M15.0 PRIMARY OSTEOARTHRITIS INVOLVING MULTIPLE JOINTS: ICD-10-CM

## 2024-10-15 DIAGNOSIS — E78.00 HYPERCHOLESTEROLEMIA: ICD-10-CM

## 2024-10-15 PROCEDURE — 99213 OFFICE O/P EST LOW 20 MIN: CPT | Performed by: FAMILY MEDICINE

## 2024-10-15 PROCEDURE — 3075F SYST BP GE 130 - 139MM HG: CPT | Performed by: FAMILY MEDICINE

## 2024-10-15 PROCEDURE — 3078F DIAST BP <80 MM HG: CPT | Performed by: FAMILY MEDICINE

## 2024-10-15 PROCEDURE — 1160F RVW MEDS BY RX/DR IN RCRD: CPT | Performed by: FAMILY MEDICINE

## 2024-10-15 PROCEDURE — 3008F BODY MASS INDEX DOCD: CPT | Performed by: FAMILY MEDICINE

## 2024-10-15 PROCEDURE — 1157F ADVNC CARE PLAN IN RCRD: CPT | Performed by: FAMILY MEDICINE

## 2024-10-15 PROCEDURE — 1159F MED LIST DOCD IN RCRD: CPT | Performed by: FAMILY MEDICINE

## 2024-10-15 PROCEDURE — 1124F ACP DISCUSS-NO DSCNMKR DOCD: CPT | Performed by: FAMILY MEDICINE

## 2024-10-15 PROCEDURE — 1036F TOBACCO NON-USER: CPT | Performed by: FAMILY MEDICINE

## 2024-10-15 RX ORDER — LISINOPRIL 20 MG/1
20 TABLET ORAL DAILY
Qty: 90 TABLET | Refills: 1 | Status: SHIPPED | OUTPATIENT
Start: 2024-10-15

## 2024-10-15 RX ORDER — PREDNISONE 10 MG/1
10 TABLET ORAL DAILY
COMMUNITY
End: 2024-10-15 | Stop reason: SDUPTHER

## 2024-10-15 RX ORDER — PREDNISONE 10 MG/1
10 TABLET ORAL 2 TIMES DAILY
Qty: 60 TABLET | Refills: 0 | Status: SHIPPED | OUTPATIENT
Start: 2024-10-15

## 2024-10-15 RX ORDER — SIMVASTATIN 40 MG/1
40 TABLET, FILM COATED ORAL NIGHTLY
Qty: 90 TABLET | Refills: 1 | Status: SHIPPED | OUTPATIENT
Start: 2024-10-15

## 2024-10-15 ASSESSMENT — PATIENT HEALTH QUESTIONNAIRE - PHQ9
2. FEELING DOWN, DEPRESSED OR HOPELESS: NOT AT ALL
SUM OF ALL RESPONSES TO PHQ9 QUESTIONS 1 AND 2: 0
1. LITTLE INTEREST OR PLEASURE IN DOING THINGS: NOT AT ALL

## 2024-10-15 NOTE — PROGRESS NOTES
"Subjective   Patient ID: Vic Wise is a 69 y.o. male who presents for Hypertension and Hyperlipidemia.  HPI  HTN & HLD follow up  Denies chest pain,SOB, swelling, headaches, lightheadedness or dizziness.   Eats a generally healthy diet, Active .   Does check BP at home.   Medication working well.     Flu done 10/4/24     Advanced Care Planning  Vic Wise and I discussed their advance care plan preferences such as living will, and durable health care power of , which include: no Attempt Resuscitation, yes Intubate & Ventilate, yes Comfort Care or Life- Sustaning Care. I reminded patient to talk with their health care agent, Phil Wise , about their health care goals. Note reflects patient's free will and care goals as expressed to me.     No other concern /question   ROS  Constitutional- No activity change. No appetite change.  Eyes- Denies vision changes.  Respiratory- No shortness of breath.  Cardiovascular- No palpitations. No chest pain.  GI- No nausea or vomiting. No diarrhea or constipation. Denies abdominal pain.  Musculoskeletal- Denies joint swelling.  Extremities- No edema.  Neurological- Denies headaches. Denies dizziness.  Skin- No rashes.  Psychiatric/Behavioral- Denies significant anxiety, or depressed mood.     Objective     /64 (BP Location: Left arm, Patient Position: Sitting, BP Cuff Size: Adult)   Pulse 68   Temp 36.2 °C (97.1 °F) (Temporal)   Resp 16   Ht 1.803 m (5' 11\")   Wt 95.7 kg (211 lb)   SpO2 98%   BMI 29.43 kg/m²     No Known Allergies    Constitutional-- Well-nourished.  No distress  Head- unremarkable.  Eyes- PERRL.  Conjunctiva normal.  Nose- Normal.  No rhinorrhea noted.  Throat- Oropharynx is clear and moist.  Neck- Supple with no thyromegaly.  No significant cervical adenopathy noted.  Pulmonary/Chest- Breath sounds normal with normal effort.  No wheezing.  Heart- Regular rate and rhythm.  No murmur.  Abdomen- Soft and non-tender.  No masses " noted.  Musculoskeletal- Normal ROM.  No significant joint swelling  Extremities- No edema.   Neurological- Alert.  No noted deficits.  Skin- Warm.  No rashes.  Psychiatric/Behavioral- Mood and affect normal.  Behavior normal.     Assessment/Plan   1. Primary hypertension        2. Primary osteoarthritis involving multiple joints  predniSONE (Deltasone) 10 mg tablet      3. Prostate cancer screening  Prostate Specific Antigen, Screen      4. Hypercholesterolemia  simvastatin (Zocor) 40 mg tablet      5. Hypertension, unspecified type  lisinopril 20 mg tablet    Comprehensive Metabolic Panel    Lipid Panel      6. BMI 29.0-29.9,adult               Long talk. Treatment options reviewed.    Reviewed most recent lab work with patient. Advised patient to remain up to date on routine maintenance and health screening.  Maintain appointments with specialists as scheduled.  Advised patient to remain up to date on immunizations.     Discussed hypertension. Take Lisinopril as discussed.      Osteoarthritis controlled. Educated the patient on osteoarthritis care and management. Educated on muscle strength and exercise.    Discussed importance of natural sources of nutrition.  Advised patient to consume vegetables, salads, fruits, nuts, and proteins such as fish and chicken.  Discussed portion control.      Hypertension controlled.  Arthritis stable.    Complete blood work as discussed. Advised patient to remain properly hydrated.     Continue and take your medications as prescribed.    Health Maintenance issues discussed.    Importance of healthy diet and regular exercise regimen discussed.    We will contact you with any test results ordered. If you do not hear from us, please contact.    Follow-up as instructed or sooner if any problems or symptoms do not resolve as expected.          Scribe Attestation  By signing my name below, Nata VENCES Scribe   attest that this documentation has been prepared under the direction  and in the presence of William Dasilva MD.

## 2024-11-14 ENCOUNTER — LAB (OUTPATIENT)
Dept: LAB | Facility: LAB | Age: 69
End: 2024-11-14
Payer: MEDICARE

## 2024-11-14 DIAGNOSIS — I48.0 PAROXYSMAL ATRIAL FIBRILLATION (MULTI): ICD-10-CM

## 2024-11-14 DIAGNOSIS — Z12.5 PROSTATE CANCER SCREENING: ICD-10-CM

## 2024-11-14 DIAGNOSIS — I10 PRIMARY HYPERTENSION: ICD-10-CM

## 2024-11-14 DIAGNOSIS — I49.5 SINUS NODE DYSFUNCTION (MULTI): ICD-10-CM

## 2024-11-14 DIAGNOSIS — I34.0 NONRHEUMATIC MITRAL VALVE REGURGITATION: ICD-10-CM

## 2024-11-14 DIAGNOSIS — R73.9 ELEVATED BLOOD SUGAR: Primary | ICD-10-CM

## 2024-11-14 DIAGNOSIS — E78.00 HYPERCHOLESTEROLEMIA: ICD-10-CM

## 2024-11-14 DIAGNOSIS — I10 HYPERTENSION, UNSPECIFIED TYPE: ICD-10-CM

## 2024-11-14 LAB
ALBUMIN SERPL BCP-MCNC: 4 G/DL (ref 3.4–5)
ALP SERPL-CCNC: 63 U/L (ref 33–136)
ALT SERPL W P-5'-P-CCNC: 10 U/L (ref 10–52)
ANION GAP SERPL CALC-SCNC: 11 MMOL/L (ref 10–20)
AST SERPL W P-5'-P-CCNC: 13 U/L (ref 9–39)
BILIRUB SERPL-MCNC: 0.5 MG/DL (ref 0–1.2)
BUN SERPL-MCNC: 15 MG/DL (ref 6–23)
CALCIUM SERPL-MCNC: 9 MG/DL (ref 8.6–10.3)
CHLORIDE SERPL-SCNC: 107 MMOL/L (ref 98–107)
CHOLEST SERPL-MCNC: 130 MG/DL (ref 0–199)
CHOLESTEROL/HDL RATIO: 3.6
CO2 SERPL-SCNC: 27 MMOL/L (ref 21–32)
CREAT SERPL-MCNC: 1.16 MG/DL (ref 0.5–1.3)
EGFRCR SERPLBLD CKD-EPI 2021: 67 ML/MIN/1.73M*2
ERYTHROCYTE [DISTWIDTH] IN BLOOD BY AUTOMATED COUNT: 18.4 % (ref 11.5–14.5)
GLUCOSE SERPL-MCNC: 126 MG/DL (ref 74–99)
HCT VFR BLD AUTO: 37.5 % (ref 41–52)
HDLC SERPL-MCNC: 36.3 MG/DL
HGB BLD-MCNC: 10.9 G/DL (ref 13.5–17.5)
LDLC SERPL CALC-MCNC: 74 MG/DL
MCH RBC QN AUTO: 23.1 PG (ref 26–34)
MCHC RBC AUTO-ENTMCNC: 29.1 G/DL (ref 32–36)
MCV RBC AUTO: 80 FL (ref 80–100)
NON HDL CHOLESTEROL: 94 MG/DL (ref 0–149)
NRBC BLD-RTO: 0 /100 WBCS (ref 0–0)
PLATELET # BLD AUTO: 400 X10*3/UL (ref 150–450)
POTASSIUM SERPL-SCNC: 4.9 MMOL/L (ref 3.5–5.3)
PROT SERPL-MCNC: 6.4 G/DL (ref 6.4–8.2)
PSA SERPL-MCNC: 2.51 NG/ML
RBC # BLD AUTO: 4.71 X10*6/UL (ref 4.5–5.9)
SODIUM SERPL-SCNC: 140 MMOL/L (ref 136–145)
TRIGL SERPL-MCNC: 98 MG/DL (ref 0–149)
VLDL: 20 MG/DL (ref 0–40)
WBC # BLD AUTO: 4.2 X10*3/UL (ref 4.4–11.3)

## 2024-11-14 PROCEDURE — 85027 COMPLETE CBC AUTOMATED: CPT

## 2024-11-14 PROCEDURE — 36415 COLL VENOUS BLD VENIPUNCTURE: CPT

## 2024-11-14 PROCEDURE — 83036 HEMOGLOBIN GLYCOSYLATED A1C: CPT

## 2024-11-14 PROCEDURE — G0103 PSA SCREENING: HCPCS

## 2024-11-14 PROCEDURE — 80053 COMPREHEN METABOLIC PANEL: CPT

## 2024-11-14 PROCEDURE — 80061 LIPID PANEL: CPT

## 2024-11-15 LAB
EST. AVERAGE GLUCOSE BLD GHB EST-MCNC: 163 MG/DL
HBA1C MFR BLD: 7.3 %

## 2024-11-18 ENCOUNTER — TELEPHONE (OUTPATIENT)
Dept: PRIMARY CARE | Facility: CLINIC | Age: 69
End: 2024-11-18
Payer: MEDICARE

## 2024-11-18 ENCOUNTER — TELEPHONE (OUTPATIENT)
Dept: CARDIOLOGY | Facility: CLINIC | Age: 69
End: 2024-11-18
Payer: MEDICARE

## 2024-11-18 NOTE — TELEPHONE ENCOUNTER
----- Message from William Dasilva sent at 11/16/2024  7:39 AM EST -----  Labs are within normal limits or stable but your blood sugar is significantly higher than last time indicating you have diabetes.  Recommend starting metformin  mg daily 30 pills with 3 refills.  Improve low sugar diet.  Follow-up with me in 2 to 3 months.  Please let him know and arrange

## 2024-11-18 NOTE — TELEPHONE ENCOUNTER
Patient has an appointment with Dr. Donell Tirado this week and labs will be discussed at that time.

## 2024-11-18 NOTE — TELEPHONE ENCOUNTER
----- Message from Donell Tirado sent at 11/15/2024  5:25 PM EST -----  Labs reviewed and look fine.  Similar to previous results.  Mild anemia and mildly elevated glucose of 126.  Will defer evaluation of these findings to Dr. Dasilva.  Follow-up as scheduled.  Thanks.

## 2024-11-18 NOTE — TELEPHONE ENCOUNTER
PATIENT AWARE. PATIENT STATES HE REFUSES TO TAKE MEDICATION FOR DIABETES AT THIS TIME AND HE STATES THAT HE IS GOING TO WORK ON HIS DIET. PLEASE ADVISE

## 2024-11-20 ENCOUNTER — APPOINTMENT (OUTPATIENT)
Dept: CARDIOLOGY | Facility: CLINIC | Age: 69
End: 2024-11-20
Payer: MEDICARE

## 2024-11-20 VITALS
HEART RATE: 83 BPM | DIASTOLIC BLOOD PRESSURE: 66 MMHG | SYSTOLIC BLOOD PRESSURE: 110 MMHG | HEIGHT: 71 IN | BODY MASS INDEX: 28.84 KG/M2 | WEIGHT: 206 LBS

## 2024-11-20 DIAGNOSIS — I10 PRIMARY HYPERTENSION: ICD-10-CM

## 2024-11-20 DIAGNOSIS — I48.0 PAROXYSMAL ATRIAL FIBRILLATION (MULTI): ICD-10-CM

## 2024-11-20 DIAGNOSIS — I49.5 SINUS NODE DYSFUNCTION (MULTI): ICD-10-CM

## 2024-11-20 DIAGNOSIS — I34.0 NONRHEUMATIC MITRAL VALVE REGURGITATION: ICD-10-CM

## 2024-11-20 DIAGNOSIS — E78.00 HYPERCHOLESTEROLEMIA: ICD-10-CM

## 2024-11-20 PROCEDURE — 3078F DIAST BP <80 MM HG: CPT | Performed by: INTERNAL MEDICINE

## 2024-11-20 PROCEDURE — 1159F MED LIST DOCD IN RCRD: CPT | Performed by: INTERNAL MEDICINE

## 2024-11-20 PROCEDURE — 99214 OFFICE O/P EST MOD 30 MIN: CPT | Performed by: INTERNAL MEDICINE

## 2024-11-20 PROCEDURE — 1123F ACP DISCUSS/DSCN MKR DOCD: CPT | Performed by: INTERNAL MEDICINE

## 2024-11-20 PROCEDURE — 1157F ADVNC CARE PLAN IN RCRD: CPT | Performed by: INTERNAL MEDICINE

## 2024-11-20 PROCEDURE — 3008F BODY MASS INDEX DOCD: CPT | Performed by: INTERNAL MEDICINE

## 2024-11-20 PROCEDURE — 3074F SYST BP LT 130 MM HG: CPT | Performed by: INTERNAL MEDICINE

## 2024-11-20 PROCEDURE — 93000 ELECTROCARDIOGRAM COMPLETE: CPT | Performed by: INTERNAL MEDICINE

## 2024-11-20 NOTE — PATIENT INSTRUCTIONS
PLEASE BRING ALL MEDICATION BOTTLES TO OFFICE VISITS    HAVE LABS DONE BEFORE NEXT OFFICE VISIT (FASTING LABS)

## 2024-11-20 NOTE — PROGRESS NOTES
Patient:  Vic Wise  YOB: 1955  MRN: [id number]       HPI:       Vic Wise is a 69 y.o. male who returns today for cardiac follow-up.  He was seen on December 28, 2022 for newly diagnosed atrial fibrillation. He does not have any history of atherosclerotic heart or valvular heart disease. He was diagnosed with hypertension more than 20 years ago. His blood pressures have been well controlled with lisinopril. He has hyperlipidemia for which he takes simvastatin. No history of diabetes mellitus. He has never smoked. He has a history of some iron deficiency anemia related to hemorrhoids.     He was noted during a routine office visit on December 7, 2022 with Dr. Dasilva to have atrial fibrillation with a heart rate of 78 bpm. No significant ST changes. He had been in the office 6 months prior to that and had a regular rhythm. He was essentially asymptomatic. He noted a very brief episode of palpitations on Ronaldo Day. He was started on Eliquis 5 mg twice daily.  He has a [id number] score of 2.  His heart rates were controlled without any negative chronotropic agents suggesting an element of underlying conduction disease.      An echocardiogram on January 25, 2023 showed normal LV systolic function. Estimated LV ejection fraction of 60 to 65%. There was mild RV enlargement with normal RV systolic function. Mild left atrial enlargement. 2+ mitral regurgitation and 1+ tricuspid regurgitation. Estimated RVSP 44 mmHg. Very similar to previous study in 2015. An exercise treadmill test on January 25, 2020 was negative for significant ST changes. He achieved 4.6 METS and 104% of maximally predicted heart rate. Resting heart rate was 99 bpm with rapid increase to 139 bpm. A 24-hour Holter monitor on January 24, 2023 showed atrial fibrillation throughout with an average heart rate 79 bpm. Heart rates ranged from 36 bpm in the early morning hours to a maximal heart rate of 141 beats minute. He had 186 pauses  of greater than 2 seconds. The overwhelming majority occurred during sleeping hours. Longest pause was 3.1 seconds. Lab studies dated December 28, 2022 showed a normal CBC and CMP except for glucose 129. TSH was normal. Magnesium level was 1.74.      He underwent external electrocardioversion to normal sinus rhythm by Dr. Abraham on February 24, 2023. He was noted on a follow-up visit with Dr. Abraham on March 14, 2023 to be back in atrial fibrillation. Heart rate 61 bpm. Dr. Abraham recommended admission for drug loading with dofetilide due to underlying sinus node dysfunction. He was admitted April 4, 2023 and started on dofetilide to 500 mcg twice daily. In the early morning hours of April 5, 2023 he converted to normal sinus rhythm. He was noted to have low magnesium levels and was give IV Mg Sulfate and was started on Mg Oxide.      He had a partial small bowel resection earlier this year.  He developed progressively worsening abdominal discomfort and was diagnosed with a partial small bowel obstruction secondary to adhesions.  He underwent exploratory laparotomy and lysis of adhesions on December 11, 2023.  He noted that the surgery took a toll on him and it took him several weeks to fully recover.      He states that he has been feeling great.  He denies any chest pain or shortness of breath. He denies any orthopnea, PND, or increasing peripheral edema. He denies any lightheadedness, near-syncope, or syncope. He denies any fever, chillekgs, or cough. He denies any nausea, vomiting, or diaphoresis. He denies any hemoptysis, hematemesis, melena, or hematochezia.  His EKG in the office today shows recurrent atrial fibrillation with heart rates 83 bpm, rare PVC, nonspecific ST-T wave changes.  Corrected QT interval 456 ms.  Lab studies November 14, 2024 showed a hemoglobin 10.9 and hematocrit 37.5.  Comprehensive metabolic profile normal with exception of glucose 126.  Cholesterol 130 with HDL 36, LDL 74, and  triglycerides 98.  Hemoglobin A1c 7.3.     The patient states that he is confused as he has always had symptoms before when he was in atrial fibrillation.  He will be scheduled for a 24-hour Holter monitor.  Assuming this shows persistent atrial fibrillation, arrangements will be made for him to undergo repeat cardioversion.  Consideration to increase his Tikosyn dose to 500 mcg twice daily.   His blood pressures are well-controlled.  He does not have any anginal or CHF symptoms.  We discussed the importance of continuing long-term anticoagulation with Eliquis.  Other details as noted below.    The above portion of this note was dictated by me using voice recognition software. I personally performed the services described in the documentation. The scribe entering the documentation below was in my presence. I affirm that the information is both accurate and complete.      Objective:     Vitals:    11/20/24 1419   BP: 110/66   Pulse: 83       Wt Readings from Last 4 Encounters:   11/20/24 93.4 kg (206 lb)   10/15/24 95.7 kg (211 lb)   05/31/24 90.3 kg (199 lb)   05/15/24 89.2 kg (196 lb 9.6 oz)       Allergies:     No Known Allergies       Medications:     Current Outpatient Medications   Medication Instructions    apixaban (ELIQUIS) 5 mg, oral, 2 times daily    dofetilide (TIKOSYN) 250 mcg, oral, 2 times daily    ferrous sulfate (IRON ORAL) 45 mg, Daily    lisinopril 20 mg, oral, Daily    magnesium oxide (Mag-Ox) 400 mg (241.3 mg magnesium) tablet 1 tablet, Daily    predniSONE (DELTASONE) 10 mg, oral, 2 times daily    simvastatin (ZOCOR) 40 mg, oral, Nightly       Physical Examination:   GENERAL:  Well developed, well nourished, in no acute distress.  CHEST:  Symmetric and nontender.  NEURO/PSYCH:  Alert and oriented times three with approppriate behavior and responses.  NECK:  Supple, no JVD, no bruit.  LUNGS:  Clear to auscultation bilaterally, normal respiratory effort.  HEART:  Rate and rhythm irregularly  irregular with no evident murmur, no gallop appreciated.        There are no rubs, clicks or heaves.  EXTREMITIES:  Warm with good color, no clubbing or cyanosis.  There is no edema noted.  PERIPHERAL VASCULAR:  Pulses present and equally palpable; 2+ throughout.      Lab:     CBC:   Lab Results   Component Value Date    WBC 4.2 (L) 11/14/2024    RBC 4.71 11/14/2024    HGB 10.9 (L) 11/14/2024    HCT 37.5 (L) 11/14/2024     11/14/2024        CMP:    Lab Results   Component Value Date     11/14/2024    K 4.9 11/14/2024     11/14/2024    CO2 27 11/14/2024    BUN 15 11/14/2024    CREATININE 1.16 11/14/2024    GLUCOSE 126 (H) 11/14/2024    CALCIUM 9.0 11/14/2024       Lipid Profile:    Lab Results   Component Value Date    TRIG 98 11/14/2024    HDL 36.3 11/14/2024    LDLCALC 74 11/14/2024       BMP:  Lab Results   Component Value Date     11/14/2024     12/15/2023     12/14/2023    K 4.9 11/14/2024    K 4.1 12/15/2023    K 4.3 12/14/2023     11/14/2024    CL 99 12/15/2023     12/14/2023    CO2 27 11/14/2024    CO2 27 12/15/2023    CO2 28 12/14/2023    BUN 15 11/14/2024    BUN 26 (H) 12/15/2023    BUN 18 12/14/2023    CREATININE 1.16 11/14/2024    CREATININE 1.27 12/15/2023    CREATININE 1.18 12/14/2023       CBC:  Lab Results   Component Value Date    WBC 4.2 (L) 11/14/2024    WBC 9.3 12/17/2023    WBC 7.4 12/16/2023    RBC 4.71 11/14/2024    RBC 4.71 12/17/2023    RBC 4.03 (L) 12/16/2023    HGB 10.9 (L) 11/14/2024    HGB 12.9 (L) 12/17/2023    HGB 11.1 (L) 12/16/2023    HCT 37.5 (L) 11/14/2024    HCT 40.4 (L) 12/17/2023    HCT 35.3 (L) 12/16/2023    MCV 80 11/14/2024    MCV 86 12/17/2023    MCV 88 12/16/2023    MCH 23.1 (L) 11/14/2024    MCH 27.4 12/17/2023    MCH 27.5 12/16/2023    MCHC 29.1 (L) 11/14/2024    MCHC 31.9 (L) 12/17/2023    MCHC 31.4 (L) 12/16/2023    RDW 18.4 (H) 11/14/2024    RDW 14.8 (H) 12/17/2023    RDW 15.2 (H) 12/16/2023     11/14/2024    PLT  385 12/17/2023     12/16/2023    MPV 10.0 10/24/2023       Hepatic Function Panel:    Lab Results   Component Value Date    ALKPHOS 63 11/14/2024    ALT 10 11/14/2024    AST 13 11/14/2024    PROT 6.4 11/14/2024    BILITOT 0.5 11/14/2024       HgBA1c:    Lab Results   Component Value Date    HGBA1C 7.3 (H) 11/14/2024       Magnesium:    Lab Results   Component Value Date    MG 2.13 12/17/2023       TSH:    Lab Results   Component Value Date    TSH 1.21 04/04/2023       PT/INR:    Lab Results   Component Value Date    PROTIME 12.9 (H) 11/28/2023    INR 1.1 11/28/2023       Cardiac Enzymes:    Lab Results   Component Value Date    TROPHS 6 07/20/2023    TROPHS 7 07/20/2023       Problem List:     Patient Active Problem List   Diagnosis    Abnormal EKG    Arthritis    BPH (benign prostatic hyperplasia)    Chronic kidney disease, stage 3a (Multi)    Eczema    Fatigue    HTN (hypertension)    Hypercholesterolemia    Irregular heart beat    Low iron    Lower back pain    Nonrheumatic mitral valve regurgitation    Paroxysmal atrial fibrillation (Multi)    Positive colorectal cancer screening using Cologuard test    Primary osteoarthritis involving multiple joints    Psoriasis    Red blood cell abnormality    Sinus node dysfunction (Multi)    Snoring    Abdominal pain    Abnormal echocardiogram    Coronary artery calcification seen on CAT scan    SERGIO (obstructive sleep apnea)    Peripheral neuropathy    Small bowel obstruction (Multi)    Essential hypertension    Hyperlipidemia    Chronic back pain    Atrial fibrillation (Multi)    Chronic anticoagulation    Class 1 obesity with body mass index (BMI) of 30.0 to 30.9 in adult    High risk medication use    Small bowel obstruction due to adhesions (Multi)    BMI 29.0-29.9,adult    Prostate cancer screening       Asessment:     Problem List Items Addressed This Visit             ICD-10-CM    HTN (hypertension) I10    Relevant Orders    Follow Up In Cardiology    CBC     Comprehensive metabolic panel    Lipid panel    Hypercholesterolemia E78.00    Relevant Orders    Follow Up In Cardiology    CBC    Comprehensive metabolic panel    Lipid panel    Nonrheumatic mitral valve regurgitation I34.0    Relevant Orders    Follow Up In Cardiology    Paroxysmal atrial fibrillation (Multi) I48.0    Relevant Orders    ECG 12 lead (Clinic Performed)    Follow Up In Cardiology    Holter or Event Cardiac Monitor    Sinus node dysfunction (Multi) I49.5    Relevant Orders    Follow Up In Cardiology    CBC    Comprehensive metabolic panel    Lipid panel

## 2024-11-22 ENCOUNTER — ANCILLARY PROCEDURE (OUTPATIENT)
Dept: CARDIOLOGY | Facility: CLINIC | Age: 69
End: 2024-11-22
Payer: MEDICARE

## 2024-11-22 DIAGNOSIS — I48.0 PAROXYSMAL ATRIAL FIBRILLATION (MULTI): ICD-10-CM

## 2024-11-26 ENCOUNTER — DOCUMENTATION (OUTPATIENT)
Dept: CARDIOLOGY | Facility: CLINIC | Age: 69
End: 2024-11-26
Payer: MEDICARE

## 2024-11-26 PROCEDURE — 93227 XTRNL ECG REC<48 HR R&I: CPT | Performed by: INTERNAL MEDICINE

## 2024-12-02 ENCOUNTER — TELEPHONE (OUTPATIENT)
Dept: CARDIOLOGY | Facility: CLINIC | Age: 69
End: 2024-12-02
Payer: MEDICARE

## 2024-12-02 NOTE — TELEPHONE ENCOUNTER
----- Message from Donell Tirado sent at 11/29/2024  4:03 PM EST -----  Regarding: RE:  He is Holter monitor was reviewed and shows persistent atrial fibrillation.  Heart rates are controlled.  He will need to be scheduled for a repeat cardioversion in the future.  We are going to check with Dr. Abraham first to see if he prefers to adjust the dosing of his Tikosyn prior to the cardioversion being scheduled.  Will contact him next week with recommendations.  Thanks.  ----- Message -----  From: Richard Abraham MD  Sent: 11/27/2024   5:51 PM EST  To: Gabbie Miller; Donell Tirado MD  Subject: FW:                                              Yes  Better to increase the dose but need to see how’s qt and renal function  I may have to see him sooner  ----- Message -----  From: Donell Tirado MD  Sent: 11/27/2024   4:49 PM EST  To: MD James Hutson.  I recently saw Mr. Wise in the office and he was back in atrial fibrillation.  His Holter confirms persistent atrial fibrillation with controlled ventricular response.  I was planning to schedule him for repeat cardioversion but wanted to check with you to see if your preference was to initially increase his Tikosyn to a higher dose.  Thanks for your input... Abad.  ----- Message -----  From: Alex Rivas MD  Sent: 11/26/2024  10:21 PM EST  To: Donell Tirado MD

## 2024-12-02 NOTE — TELEPHONE ENCOUNTER
Called patient who is agreeable to come in on 12/23/24 at 9:30 as scheduled. Maria Teresa Hyman, CMA

## 2024-12-23 ENCOUNTER — APPOINTMENT (OUTPATIENT)
Dept: CARDIOLOGY | Facility: CLINIC | Age: 69
End: 2024-12-23
Payer: MEDICARE

## 2024-12-23 VITALS
SYSTOLIC BLOOD PRESSURE: 124 MMHG | BODY MASS INDEX: 29.57 KG/M2 | HEIGHT: 71 IN | WEIGHT: 211.2 LBS | DIASTOLIC BLOOD PRESSURE: 72 MMHG | HEART RATE: 66 BPM

## 2024-12-23 DIAGNOSIS — Z78.9 NEVER SMOKED TOBACCO: ICD-10-CM

## 2024-12-23 DIAGNOSIS — Z79.899 HIGH RISK MEDICATION USE: ICD-10-CM

## 2024-12-23 DIAGNOSIS — I49.5 SINUS NODE DYSFUNCTION (MULTI): ICD-10-CM

## 2024-12-23 DIAGNOSIS — Z51.81 ANTICOAGULATION MANAGEMENT ENCOUNTER: Primary | ICD-10-CM

## 2024-12-23 DIAGNOSIS — R94.31 ABNORMAL EKG: ICD-10-CM

## 2024-12-23 DIAGNOSIS — I48.0 PAROXYSMAL ATRIAL FIBRILLATION (MULTI): ICD-10-CM

## 2024-12-23 DIAGNOSIS — Z79.01 ANTICOAGULATION MANAGEMENT ENCOUNTER: Primary | ICD-10-CM

## 2024-12-23 PROCEDURE — 3008F BODY MASS INDEX DOCD: CPT | Performed by: INTERNAL MEDICINE

## 2024-12-23 PROCEDURE — 1036F TOBACCO NON-USER: CPT | Performed by: INTERNAL MEDICINE

## 2024-12-23 PROCEDURE — 1159F MED LIST DOCD IN RCRD: CPT | Performed by: INTERNAL MEDICINE

## 2024-12-23 PROCEDURE — 3078F DIAST BP <80 MM HG: CPT | Performed by: INTERNAL MEDICINE

## 2024-12-23 PROCEDURE — 3074F SYST BP LT 130 MM HG: CPT | Performed by: INTERNAL MEDICINE

## 2024-12-23 PROCEDURE — 1123F ACP DISCUSS/DSCN MKR DOCD: CPT | Performed by: INTERNAL MEDICINE

## 2024-12-23 PROCEDURE — 1157F ADVNC CARE PLAN IN RCRD: CPT | Performed by: INTERNAL MEDICINE

## 2024-12-23 PROCEDURE — 93000 ELECTROCARDIOGRAM COMPLETE: CPT | Performed by: INTERNAL MEDICINE

## 2024-12-23 PROCEDURE — 99215 OFFICE O/P EST HI 40 MIN: CPT | Performed by: INTERNAL MEDICINE

## 2024-12-23 NOTE — PATIENT INSTRUCTIONS
On January 1, 2025 increase Dofetilide to 500 mg one tab in the morning and one tab at night, Come to [place] office for EKG on 01/02/2025 in office, Friday 01/03/2025 cardioversion in [place].    Please bring any lab results from other providers / physicians to your next appointment.     Please bring all medicines, vitamins, and herbal supplements with you when you come to the office.     Prescriptions will not be filled unless you are compliant with your follow up appointments or have a follow up appointment scheduled as per instruction of your physician. Refills should be requested at the time of your visit.      DID YOU KNOW:  We have a pharmacy here in the [facility].  They can fill all prescriptions, not just cardiac medications.  Prescriptions from other pharmacies can easily be transferred to the  pharmacy by the  pharmacist on site.   pharmacies offer FREE HOME DELIVERY on medications to anywhere in [place]. They can sync your medications. Typically prescriptions can be ready in 10 - 15 minutes. If pharmacy is unable to fill your  prescription or if cost is more than your paying now the Pharmacist can easily transfer back to your Pharmacy of choice. Pharmacy phone # [phone].     Scribe Attestation  By signing my name below, I, Neri MARTIN , Scrgabe   attest that this documentation has been prepared under the direction and in the presence of Richard Abraham MD.

## 2024-12-23 NOTE — PROGRESS NOTES
CARDIOLOGY OFFICE VISIT      CHIEF COMPLAINT  Chief Complaint   Patient presents with    Follow-up       HISTORY OF PRESENT ILLNESS  HPI  69-year-old male with a past medical history of hypertension, hyperlipidemia. Patient noticed some palpitations and fatigue back in December 2022. He was diagnosed of atrial fibrillation by primary care physician. He was placed on Eliquis therapy.     An echocardiogram January 2023 shows normal left ventricular function value of 60 to 65% with 2+ mitral rotation 1+ tricuspid regurgitation. Exercise treadmill stress test in January 2022 was negative for significant ST changes. He achieved 4.6 METS and 104% maximal heart rate predicted. Holter monitoring January 2023 shows underlying rhythm of atrial fibrillation with a maximal heart rate of 141 bpm average heart rate of 79 bpm minimum heart rate of 36 bpm. There were frequent pauses up to 3.1 seconds of duration most of the time during sleep time.     Patient underwent cardioversion in February 24, 2023 with successful restoration to sinus rhythm.     During the last visit, he had recurrence of atrial fibrillation. Patient was admitted for drug load with high risk medication (dofetilide). He converted during hospitalization.    Patient was admitted at [facility] in December 2023.  He was admitted to the regular nursing floor after departing the PACU after undergoing an exploratory laparotomy lysis of adhesions small bowel resection with primary anastomosis. His postop course was relatively smooth although longer than typical. Given the patient's past history it was important for him to have bowel function prior to discharge from the hospital. It took until postop day 5 before he started with BMs and flatus. During this period of time he was able to tolerate liquid however small amounts.     Patient states that so far he has been doing well.  He denies any symptoms of chest pain or shortness breath or palpitations.   Patient was seen by cardiology service in November 2024 and he was found to be back in atrial fibrillation.    EKG performed today shows atrial fibrillation at a rate of 66 bpm QRS duration 80 ms QT corrected 440 ms.  Rhythm strip shows the same pattern.        Past Medical History  Past Medical History:   Diagnosis Date    Anemia     Arrhythmia     Cataract     Encounter for general adult medical examination without abnormal findings 10/11/2021    Encounter for Medicare annual wellness exam    Encounter for general adult medical examination without abnormal findings 05/18/2022    Medicare annual wellness visit, subsequent    Encounter for general adult medical examination without abnormal findings 10/14/2020    Encounter for Medicare annual wellness exam    Encounter for immunization 10/14/2020    Encounter for immunization    Encounter for screening for malignant neoplasm of colon 05/18/2022    Colon cancer screening    Encounter for screening for malignant neoplasm of colon 10/14/2020    Colon cancer screening    Encounter for screening for malignant neoplasm of prostate 09/17/2019    Screening PSA (prostate specific antigen)    Encounter for screening for malignant neoplasm of prostate 05/18/2022    Screening PSA (prostate specific antigen)    Encounter for screening for other disorder 05/18/2022    Special screening for other conditions    GI (gastrointestinal bleed)     History of blood transfusion     Hordeolum externum left eye, unspecified eyelid 05/17/2020    Hordeolum externum of left eye, unspecified eyelid    Hyperlipidemia     Other specified disorders of eye and adnexa 05/17/2020    Redness of eye, left    Other specified disorders of nose and nasal sinuses 12/14/2020    Sinus drainage    Personal history of other diseases of the nervous system and sense organs 05/17/2020    History of conjunctivitis    Personal history of other drug therapy 10/11/2021    History of influenza vaccination       Social  History  Social History     Tobacco Use    Smoking status: Never    Smokeless tobacco: Never   Vaping Use    Vaping status: Never Used   Substance Use Topics    Alcohol use: Not Currently    Drug use: Never       Family History     Family History   Problem Relation Name Age of Onset    Other (cardiac arrhythmia) Father          Allergies:  No Known Allergies     Outpatient Medications:  Current Outpatient Medications   Medication Instructions    apixaban (ELIQUIS) 5 mg, oral, 2 times daily    dofetilide (TIKOSYN) 250 mcg, oral, 2 times daily    ferrous sulfate (IRON ORAL) 45 mg, As needed    lisinopril 20 mg, oral, Daily    magnesium oxide (Mag-Ox) 400 mg (241.3 mg magnesium) tablet 1 tablet, Daily    simvastatin (ZOCOR) 40 mg, oral, Nightly          REVIEW OF SYSTEMS  Review of Systems   All other systems reviewed and are negative.        VITALS  Vitals:    12/23/24 0949   BP: 124/72   Pulse: 66       PHYSICAL EXAM  Cardiovascular:      PMI at left midclavicular line. Normal rate. Regular rhythm. Normal S1. Normal S2.       Murmurs: There is no murmur.      No gallop.  No click. No rub.   Pulses:     Intact distal pulses.   Edema:     Peripheral edema absent.           ASSESSMENT AND PLAN  Clinical impression.     1. Persistent atrial fibrillation  2. Evidence of sinus node dysfunction  3. Hypertension  4. Hyperlipidemia  5. Normal left ventricular function per echogram in January 2023  6. No evidence of ischemia per stress test in 2023  7. Long-term anticoagulant therapy with Eliquis.  8. High risk medication (dofetilide).    Plan recommendations    I had a lengthy discussion with patient and family member regarding management for atrial fibrillation.  He had recurrence of this arrhythmia.  He is on dofetilide therapy.  Starting January 1, 2025 patient will increase the dose of dofetilide to 500 mcg twice a day.  He will need an EKG the next 2 days for QT interval evaluation with the possibility of cardioversion  January 3, 2025.    Continue Eliquis therapy.    ,  Procedure, risk, benefits and possible complications were explained to patient.  All questions were answered.  Patient agrees with plan.      Follow my office 4 to 6 weeks post cardioversion or sooner if needed.    Risk factor modification and lifestyle modification discussed with patient. Diet , exercise and hydration discussed with patient.    I have personally review with patient during this office visit, laboratory data, echocardiogram results, stress test results, Holter-event monitor results prior and after the last electrophysiology visit. All questions has been answered.    Please excuse any errors in grammar or translation related to this dictation.  Voice recognition software was utilized to prepare this document.

## 2025-01-02 ENCOUNTER — TELEPHONE (OUTPATIENT)
Dept: CARDIOLOGY | Facility: HOSPITAL | Age: 70
End: 2025-01-02

## 2025-01-02 ENCOUNTER — TELEPHONE (OUTPATIENT)
Dept: CARDIOLOGY | Facility: CLINIC | Age: 70
End: 2025-01-02

## 2025-01-02 ENCOUNTER — APPOINTMENT (OUTPATIENT)
Dept: CARDIOLOGY | Facility: CLINIC | Age: 70
End: 2025-01-02
Payer: MEDICARE

## 2025-01-02 VITALS — HEART RATE: 68 BPM

## 2025-01-02 DIAGNOSIS — I48.0 PAROXYSMAL ATRIAL FIBRILLATION (MULTI): ICD-10-CM

## 2025-01-02 PROCEDURE — 93000 ELECTROCARDIOGRAM COMPLETE: CPT | Performed by: INTERNAL MEDICINE

## 2025-01-02 NOTE — PROGRESS NOTES
PT IN OFFICE FOR EKG ORDERED BY Dr. Richard Abraham MD FOR AFIB. REVIEWED BY Dr. Richard Abraham MD PT IS IN RHYTHM AND CARDIOVERSION FOR 01/03/2025 HAS BEEN CANCELLED. PT TO REMAIN ON DOFETILIDE 500 MG BID. Advised pt verbalized understanding.[facility] LPN

## 2025-01-02 NOTE — TELEPHONE ENCOUNTER
Pt called the office stating he needs a refill of Tikosyn. He has 250 mcg tablets and takes 2 tablets (500 mcg) twice daily. Pt would like script to be sent to [facility] to Dr. Abraham for ok to change Capsule size to 500 mcg take 1 tablet twice daily.

## 2025-01-03 ENCOUNTER — APPOINTMENT (OUTPATIENT)
Dept: CARDIOLOGY | Facility: HOSPITAL | Age: 70
End: 2025-01-03
Payer: MEDICARE

## 2025-01-15 ENCOUNTER — APPOINTMENT (OUTPATIENT)
Dept: CARDIOLOGY | Facility: CLINIC | Age: 70
End: 2025-01-15
Payer: MEDICARE

## 2025-01-15 VITALS
HEART RATE: 74 BPM | BODY MASS INDEX: 29.4 KG/M2 | DIASTOLIC BLOOD PRESSURE: 76 MMHG | HEIGHT: 71 IN | SYSTOLIC BLOOD PRESSURE: 116 MMHG | WEIGHT: 210 LBS

## 2025-01-15 DIAGNOSIS — R94.31 ABNORMAL EKG: ICD-10-CM

## 2025-01-15 DIAGNOSIS — Z51.81 ANTICOAGULATION MANAGEMENT ENCOUNTER: ICD-10-CM

## 2025-01-15 DIAGNOSIS — Z78.9 NEVER SMOKED TOBACCO: ICD-10-CM

## 2025-01-15 DIAGNOSIS — I49.5 SINUS NODE DYSFUNCTION (MULTI): ICD-10-CM

## 2025-01-15 DIAGNOSIS — Z79.01 ANTICOAGULATION MANAGEMENT ENCOUNTER: ICD-10-CM

## 2025-01-15 DIAGNOSIS — Z79.899 HIGH RISK MEDICATION USE: ICD-10-CM

## 2025-01-15 DIAGNOSIS — I48.0 PAROXYSMAL ATRIAL FIBRILLATION (MULTI): Primary | ICD-10-CM

## 2025-01-15 PROCEDURE — 1159F MED LIST DOCD IN RCRD: CPT | Performed by: INTERNAL MEDICINE

## 2025-01-15 PROCEDURE — 3078F DIAST BP <80 MM HG: CPT | Performed by: INTERNAL MEDICINE

## 2025-01-15 PROCEDURE — 3008F BODY MASS INDEX DOCD: CPT | Performed by: INTERNAL MEDICINE

## 2025-01-15 PROCEDURE — 3074F SYST BP LT 130 MM HG: CPT | Performed by: INTERNAL MEDICINE

## 2025-01-15 PROCEDURE — 93000 ELECTROCARDIOGRAM COMPLETE: CPT | Performed by: INTERNAL MEDICINE

## 2025-01-15 PROCEDURE — 1036F TOBACCO NON-USER: CPT | Performed by: INTERNAL MEDICINE

## 2025-01-15 PROCEDURE — 1157F ADVNC CARE PLAN IN RCRD: CPT | Performed by: INTERNAL MEDICINE

## 2025-01-15 PROCEDURE — 99215 OFFICE O/P EST HI 40 MIN: CPT | Performed by: INTERNAL MEDICINE

## 2025-01-15 PROCEDURE — 1123F ACP DISCUSS/DSCN MKR DOCD: CPT | Performed by: INTERNAL MEDICINE

## 2025-01-15 RX ORDER — DOFETILIDE 0.5 MG/1
500 CAPSULE ORAL 2 TIMES DAILY
Qty: 180 CAPSULE | Refills: 3 | Status: SHIPPED | OUTPATIENT
Start: 2025-01-15

## 2025-01-15 ASSESSMENT — ENCOUNTER SYMPTOMS
DYSPNEA ON EXERTION: 0
PALPITATIONS: 0

## 2025-01-15 NOTE — PATIENT INSTRUCTIONS
Continue same medications/treatment.  Patient educated on proper medication use.  Patient educated on risk factor modification.  Please bring any lab results from other providers/physicians to your next appointment.    Please bring all medicines, vitamins, and herbal supplements with you when you come to the office.    Prescriptions will not be filled unless you are compliant with your follow up appointments or have a follow up appointment scheduled as per instruction of your physician. Refills should be requested at the time of your visit.    Follow up with Dr. Dwyer in 4-6 weeks    Afshan VENCES RN, AM SCRIBING FOR, AND IN THE PRESENCE OF DR. BELKYS DWYER MD

## 2025-01-15 NOTE — PROGRESS NOTES
CARDIOLOGY OFFICE VISIT      CHIEF COMPLAINT  Chief Complaint   Patient presents with    Follow-up     Pt is ere today following up after 3-4 weeks, cardioversion was not performed        HISTORY OF PRESENT ILLNESS  HPI  69-year-old male with a past medical history of hypertension, hyperlipidemia. Patient noticed some palpitations and fatigue back in December 2022. He was diagnosed of atrial fibrillation by primary care physician. He was placed on Eliquis therapy.     An echocardiogram January 2023 shows normal left ventricular function value of 60 to 65% with 2+ mitral rotation 1+ tricuspid regurgitation. Exercise treadmill stress test in January 2022 was negative for significant ST changes. He achieved 4.6 METS and 104% maximal heart rate predicted. Holter monitoring January 2023 shows underlying rhythm of atrial fibrillation with a maximal heart rate of 141 bpm average heart rate of 79 bpm minimum heart rate of 36 bpm. There were frequent pauses up to 3.1 seconds of duration most of the time during sleep time.     Patient underwent cardioversion in February 24, 2023 with successful restoration to sinus rhythm.     he had recurrence of atrial fibrillation. Patient was admitted for drug load with high risk medication (dofetilide). He converted during hospitalization.     Patient was admitted at [facility] in December 2023.  He was admitted to the regular nursing floor after departing the PACU after undergoing an exploratory laparotomy lysis of adhesions small bowel resection with primary anastomosis. His postop course was relatively smooth although longer than typical. Given the patient's past history it was important for him to have bowel function prior to discharge from the hospital. It took until postop day 5 before he started with BMs and flatus. During this period of time he was able to tolerate liquid however small amounts.     Patient was seen by cardiology service in November 2024 and he was found  to be back in atrial fibrillation.    Patient was seen my office in December 2024.  He was still in atrial fibrillation.  The dose of dofetilide was increased to 500 mcg twice a day.  Subsequent EKG in January 2, 2025 patient was in sinus rhythm.  Current EKG shows atrial fibrillation at a rate of 74 bpm QRS ration 88 ms QT corrected 481 ms.  Rhythm strip shows the same pattern.    Patient states that he is asymptomatic during these episodes.         Past Medical History  Past Medical History:   Diagnosis Date    Anemia     Arrhythmia     Cataract     Encounter for general adult medical examination without abnormal findings 10/11/2021    Encounter for Medicare annual wellness exam    Encounter for general adult medical examination without abnormal findings 05/18/2022    Medicare annual wellness visit, subsequent    Encounter for general adult medical examination without abnormal findings 10/14/2020    Encounter for Medicare annual wellness exam    Encounter for immunization 10/14/2020    Encounter for immunization    Encounter for screening for malignant neoplasm of colon 05/18/2022    Colon cancer screening    Encounter for screening for malignant neoplasm of colon 10/14/2020    Colon cancer screening    Encounter for screening for malignant neoplasm of prostate 09/17/2019    Screening PSA (prostate specific antigen)    Encounter for screening for malignant neoplasm of prostate 05/18/2022    Screening PSA (prostate specific antigen)    Encounter for screening for other disorder 05/18/2022    Special screening for other conditions    GI (gastrointestinal bleed)     History of blood transfusion     Hordeolum externum left eye, unspecified eyelid 05/17/2020    Hordeolum externum of left eye, unspecified eyelid    Hyperlipidemia     Hypertension     Other specified disorders of eye and adnexa 05/17/2020    Redness of eye, left    Other specified disorders of nose and nasal sinuses 12/14/2020    Sinus drainage     Personal history of other diseases of the nervous system and sense organs 05/17/2020    History of conjunctivitis    Personal history of other drug therapy 10/11/2021    History of influenza vaccination       Social History  Social History     Tobacco Use    Smoking status: Never    Smokeless tobacco: Never   Vaping Use    Vaping status: Never Used   Substance Use Topics    Alcohol use: Not Currently    Drug use: Never       Family History     Family History   Problem Relation Name Age of Onset    Other (cardiac arrhythmia) Father          Allergies:  No Known Allergies     Outpatient Medications:  Current Outpatient Medications   Medication Instructions    apixaban (ELIQUIS) 5 mg, oral, 2 times daily    dofetilide (TIKOSYN) 250 mcg, oral, 2 times daily    ferrous sulfate (IRON ORAL) 45 mg, As needed    lisinopril 20 mg, oral, Daily    magnesium oxide (Mag-Ox) 400 mg (241.3 mg magnesium) tablet 1 tablet, Daily    simvastatin (ZOCOR) 40 mg, oral, Nightly          REVIEW OF SYSTEMS  Review of Systems   Cardiovascular:  Negative for chest pain, dyspnea on exertion and palpitations.   All other systems reviewed and are negative.        VITALS  Vitals:    01/15/25 1226   BP: 116/76   Pulse: 74       PHYSICAL EXAM  Cardiovascular:      PMI at left midclavicular line. Normal rate. Irregularly irregular rhythm. Normal S1. Normal S2.       Murmurs: There is no murmur.      No gallop.  No click. No rub.      Comments: Atrial fibrillation   Pulses:     Intact distal pulses.   Edema:     Peripheral edema absent.           ASSESSMENT AND PLAN  Clinical impression.     1. Persistent atrial fibrillation  2. Evidence of sinus node dysfunction  3. Hypertension  4. Hyperlipidemia  5. Normal left ventricular function per echogram in January 2023  6. No evidence of ischemia per stress test in 2023  7. Long-term anticoagulant therapy with Eliquis.  8. High risk medication (dofetilide).      Plan recommendations    I had a lengthy  discussion with patient and family member regarding plan to for home management of atrial fibrillation.  He is still have in and out episodes of atrial fibrillation.  Dofetilide may not be controlling 100% of the time episodes of atrial fibrillation.  He is asymptomatic.  We discussed the option of pulmonary vein isolation and also changing a different antiarrhythmic therapy versus observation.  He states that he is not symptomatic and he would like to be in observation for now.    Patient will be seen my office in the next 4 to 6 weeks.    Continue with high risk medication (dofetilide) at a dose of 500 mcg twice a day.    Continue Eliquis therapy.      Risk factor modification and lifestyle modification discussed with patient. Diet , exercise and hydration discussed with patient.    I have personally review with patient during this office visit, laboratory data, echocardiogram results, stress test results, Holter-event monitor results prior and after the last electrophysiology visit. All questions has been answered.    Please excuse any errors in grammar or translation related to this dictation.  Voice recognition software was utilized to prepare this document.

## 2025-02-07 ENCOUNTER — APPOINTMENT (OUTPATIENT)
Dept: CARDIOLOGY | Facility: CLINIC | Age: 70
End: 2025-02-07
Payer: MEDICARE

## 2025-02-19 ENCOUNTER — APPOINTMENT (OUTPATIENT)
Dept: CARDIOLOGY | Facility: CLINIC | Age: 70
End: 2025-02-19
Payer: MEDICARE

## 2025-02-19 VITALS
SYSTOLIC BLOOD PRESSURE: 132 MMHG | HEIGHT: 71 IN | BODY MASS INDEX: 29.4 KG/M2 | DIASTOLIC BLOOD PRESSURE: 78 MMHG | HEART RATE: 71 BPM | WEIGHT: 210 LBS

## 2025-02-19 DIAGNOSIS — Z51.81 ANTICOAGULATION MANAGEMENT ENCOUNTER: ICD-10-CM

## 2025-02-19 DIAGNOSIS — R94.31 ABNORMAL EKG: ICD-10-CM

## 2025-02-19 DIAGNOSIS — I49.5 SINUS NODE DYSFUNCTION (MULTI): ICD-10-CM

## 2025-02-19 DIAGNOSIS — I48.0 PAROXYSMAL ATRIAL FIBRILLATION (MULTI): ICD-10-CM

## 2025-02-19 DIAGNOSIS — R01.1 MURMUR, HEART: Primary | ICD-10-CM

## 2025-02-19 DIAGNOSIS — Z78.9 NEVER SMOKED CIGARETTES: ICD-10-CM

## 2025-02-19 DIAGNOSIS — Z79.899 HIGH RISK MEDICATION USE: ICD-10-CM

## 2025-02-19 DIAGNOSIS — Z79.01 ANTICOAGULATION MANAGEMENT ENCOUNTER: ICD-10-CM

## 2025-02-19 PROCEDURE — 1159F MED LIST DOCD IN RCRD: CPT | Performed by: INTERNAL MEDICINE

## 2025-02-19 PROCEDURE — 99215 OFFICE O/P EST HI 40 MIN: CPT | Performed by: INTERNAL MEDICINE

## 2025-02-19 PROCEDURE — 3078F DIAST BP <80 MM HG: CPT | Performed by: INTERNAL MEDICINE

## 2025-02-19 PROCEDURE — 93000 ELECTROCARDIOGRAM COMPLETE: CPT | Performed by: INTERNAL MEDICINE

## 2025-02-19 PROCEDURE — 3075F SYST BP GE 130 - 139MM HG: CPT | Performed by: INTERNAL MEDICINE

## 2025-02-19 PROCEDURE — 1036F TOBACCO NON-USER: CPT | Performed by: INTERNAL MEDICINE

## 2025-02-19 PROCEDURE — 3008F BODY MASS INDEX DOCD: CPT | Performed by: INTERNAL MEDICINE

## 2025-02-19 PROCEDURE — 1123F ACP DISCUSS/DSCN MKR DOCD: CPT | Performed by: INTERNAL MEDICINE

## 2025-02-19 PROCEDURE — 1157F ADVNC CARE PLAN IN RCRD: CPT | Performed by: INTERNAL MEDICINE

## 2025-02-19 ASSESSMENT — ENCOUNTER SYMPTOMS
DYSPNEA ON EXERTION: 0
PALPITATIONS: 0

## 2025-02-19 NOTE — PROGRESS NOTES
CARDIOLOGY OFFICE VISIT      CHIEF COMPLAINT  Chief Complaint   Patient presents with    Follow-up     Patient is present for 1 month follow up.          HISTORY OF PRESENT ILLNESS  HPI  69-year-old male with a past medical history of hypertension, hyperlipidemia. Patient noticed some palpitations and fatigue back in December 2022. He was diagnosed of atrial fibrillation by primary care physician. He was placed on Eliquis therapy.     An echocardiogram January 2023 shows normal left ventricular function value of 60 to 65% with 2+ mitral regurgitation 1+ tricuspid regurgitation. Exercise treadmill stress test in January 2022 was negative for significant ST changes. He achieved 4.6 METS and 104% maximal heart rate predicted. Holter monitoring January 2023 shows underlying rhythm of atrial fibrillation with a maximal heart rate of 141 bpm average heart rate of 79 bpm minimum heart rate of 36 bpm. There were frequent pauses up to 3.1 seconds of duration most of the time during sleep time.     Patient underwent cardioversion in February 24, 2023 with successful restoration to sinus rhythm.     he had recurrence of atrial fibrillation. Patient was admitted for drug load with high risk medication (dofetilide). He converted during hospitalization.     Patient was admitted at [facility] in December 2023.  He was admitted to the regular nursing floor after departing the PACU after undergoing an exploratory laparotomy lysis of adhesions small bowel resection with primary anastomosis. His postop course was relatively smooth although longer than typical. Given the patient's past history it was important for him to have bowel function prior to discharge from the hospital. It took until postop day 5 before he started with BMs and flatus. During this period of time he was able to tolerate liquid however small amounts.      Patient was seen by cardiology service in November 2024 and he was found to be back in atrial  fibrillation.     Patient was seen my office in December 2024.  He was still in atrial fibrillation.  The dose of dofetilide was increased to 500 mcg twice a day.  Subsequent EKG in January 2, 2025 patient was in sinus rhythm.    Patient states that since the last office visit, he has been doing well.  He denies any symptoms of chest pain or shortness of breath or palpitations.    EKG performed today shows sinus rhythm first-degree AV block at a rate of 71 bpm QRS duration 86 ms QT corrected 417 ms.  Rhythm strip shows the same pattern.      Past Medical History  Past Medical History:   Diagnosis Date    Anemia     Arrhythmia     Cataract     Encounter for general adult medical examination without abnormal findings 10/11/2021    Encounter for Medicare annual wellness exam    Encounter for general adult medical examination without abnormal findings 05/18/2022    Medicare annual wellness visit, subsequent    Encounter for general adult medical examination without abnormal findings 10/14/2020    Encounter for Medicare annual wellness exam    Encounter for immunization 10/14/2020    Encounter for immunization    Encounter for screening for malignant neoplasm of colon 05/18/2022    Colon cancer screening    Encounter for screening for malignant neoplasm of colon 10/14/2020    Colon cancer screening    Encounter for screening for malignant neoplasm of prostate 09/17/2019    Screening PSA (prostate specific antigen)    Encounter for screening for malignant neoplasm of prostate 05/18/2022    Screening PSA (prostate specific antigen)    Encounter for screening for other disorder 05/18/2022    Special screening for other conditions    GI (gastrointestinal bleed)     History of blood transfusion     Hordeolum externum left eye, unspecified eyelid 05/17/2020    Hordeolum externum of left eye, unspecified eyelid    Hyperlipidemia     Hypertension     Other specified disorders of eye and adnexa 05/17/2020    Redness of eye, left     Other specified disorders of nose and nasal sinuses 12/14/2020    Sinus drainage    Personal history of other diseases of the nervous system and sense organs 05/17/2020    History of conjunctivitis    Personal history of other drug therapy 10/11/2021    History of influenza vaccination       Social History  Social History     Tobacco Use    Smoking status: Never    Smokeless tobacco: Never   Vaping Use    Vaping status: Never Used   Substance Use Topics    Alcohol use: Not Currently    Drug use: Never       Family History     Family History   Problem Relation Name Age of Onset    Other (cardiac arrhythmia) Father          Allergies:  No Known Allergies     Outpatient Medications:  Current Outpatient Medications   Medication Instructions    apixaban (ELIQUIS) 5 mg, oral, 2 times daily    dofetilide (TIKOSYN) 500 mcg, oral, 2 times daily    ferrous sulfate (IRON ORAL) 45 mg, As needed    lisinopril 20 mg, oral, Daily    magnesium oxide (Mag-Ox) 400 mg (241.3 mg magnesium) tablet 1 tablet, Daily    simvastatin (ZOCOR) 40 mg, oral, Nightly          REVIEW OF SYSTEMS  Review of Systems   Cardiovascular:  Negative for chest pain, dyspnea on exertion and palpitations.   All other systems reviewed and are negative.        VITALS  Vitals:    02/19/25 1551   BP: 132/78   Pulse: 71       PHYSICAL EXAM  Constitutional:       General: Awake.      Appearance: Normal and healthy appearance. Well-developed and not in distress.   Neck:      Vascular: No JVR. JVD normal.   Pulmonary:      Effort: Pulmonary effort is normal.      Breath sounds: Normal breath sounds. No wheezing. No rhonchi. No rales.   Chest:      Chest wall: Not tender to palpatation.   Cardiovascular:      PMI at left midclavicular line. Normal rate. Regular rhythm. Normal S1. Normal S2.       Murmurs: There is a systolic murmur.      No gallop.  No click. No rub.   Pulses:     Intact distal pulses.   Edema:     Peripheral edema absent.   Abdominal:       Tenderness: There is no abdominal tenderness.   Musculoskeletal: Normal range of motion.         General: No tenderness. Skin:     General: Skin is warm and dry.   Neurological:      General: No focal deficit present.      Mental Status: Alert and oriented to person, place and time.           ASSESSMENT AND PLAN  Clinical impression.     1. Persistent atrial fibrillation  2. Evidence of sinus node dysfunction  3. Hypertension  4. Hyperlipidemia  5. Normal left ventricular function per echogram in January 2023  6. No evidence of ischemia per stress test in 2023  7. Long-term anticoagulant therapy with Eliquis.  8. High risk medication (dofetilide).      Plan recommendations    Patient is doing well from the electrophysiology standpoint had a lengthy discussion with patient and family member regarding plan to follow for management of atrial fibrillation.  We discussed the option of rate control versus rhythm control strategy.  Patient stated again that he is not symptomatic during his arrhythmia.  Will continue with observation for now.    He will be seen my office in 3 months.    If he has episodes of palpitations, we can offer him PVI.    Continue with dofetilide therapy    Continue with Eliquis therapy.    Risk factor modification and lifestyle modification discussed with patient. Diet , exercise and hydration discussed with patient.    I have personally review with patient during this office visit, laboratory data, echocardiogram results, stress test results, Holter-event monitor results prior and after the last electrophysiology visit. All questions has been answered.    Please excuse any errors in grammar or translation related to this dictation.  Voice recognition software was utilized to prepare this document.

## 2025-02-19 NOTE — PATIENT INSTRUCTIONS
Continue same medications/treatment.  Patient educated on proper medication use.  Patient educated on risk factor modification.  Please bring any lab results from other providers/physicians to your next appointment.    Please bring all medicines, vitamins, and herbal supplements with you when you come to the office.    Prescriptions will not be filled unless you are compliant with your follow up appointments or have a follow up appointment scheduled as per instruction of your physician. Refills should be requested at the time of your visit.    SCHEDULE echocardiogram  Follow up with Dr. Dwyer in 3 months     Afshan VENCES RN, AM SCRIBING FOR, AND IN THE PRESENCE OF DR. BELKYS DWYER MD

## 2025-03-12 ENCOUNTER — HOSPITAL ENCOUNTER (OUTPATIENT)
Dept: CARDIOLOGY | Facility: CLINIC | Age: 70
Discharge: HOME | End: 2025-03-12
Payer: MEDICARE

## 2025-03-12 VITALS
SYSTOLIC BLOOD PRESSURE: 138 MMHG | HEIGHT: 71 IN | WEIGHT: 210 LBS | BODY MASS INDEX: 29.4 KG/M2 | DIASTOLIC BLOOD PRESSURE: 70 MMHG

## 2025-03-12 DIAGNOSIS — I48.0 PAROXYSMAL ATRIAL FIBRILLATION (MULTI): ICD-10-CM

## 2025-03-12 DIAGNOSIS — R01.1 MURMUR, HEART: ICD-10-CM

## 2025-03-12 PROCEDURE — 93306 TTE W/DOPPLER COMPLETE: CPT | Performed by: INTERNAL MEDICINE

## 2025-03-12 PROCEDURE — 93306 TTE W/DOPPLER COMPLETE: CPT

## 2025-03-14 LAB
AORTIC VALVE MEAN GRADIENT: 6 MMHG
AORTIC VALVE PEAK VELOCITY: 1.55 M/S
AV PEAK GRADIENT: 10 MMHG
AVA (PEAK VEL): 1.8 CM2
AVA (VTI): 1.81 CM2
EJECTION FRACTION APICAL 4 CHAMBER: 66.7
EJECTION FRACTION: 59 %
LEFT ATRIUM VOLUME AREA LENGTH INDEX BSA: 35.3 ML/M2
LEFT VENTRICLE INTERNAL DIMENSION DIASTOLE: 5.49 CM (ref 3.5–6)
LEFT VENTRICULAR OUTFLOW TRACT DIAMETER: 1.85 CM
LV EJECTION FRACTION BIPLANE: 59 %
RIGHT VENTRICLE PEAK SYSTOLIC PRESSURE: 36.5 MMHG

## 2025-05-15 ENCOUNTER — OFFICE VISIT (OUTPATIENT)
Dept: CARDIOLOGY | Facility: CLINIC | Age: 70
End: 2025-05-15
Payer: MEDICARE

## 2025-05-15 ENCOUNTER — APPOINTMENT (OUTPATIENT)
Dept: CARDIOLOGY | Facility: CLINIC | Age: 70
End: 2025-05-15
Payer: MEDICARE

## 2025-05-15 VITALS
DIASTOLIC BLOOD PRESSURE: 66 MMHG | BODY MASS INDEX: 29.4 KG/M2 | WEIGHT: 210 LBS | HEART RATE: 60 BPM | HEIGHT: 71 IN | SYSTOLIC BLOOD PRESSURE: 120 MMHG

## 2025-05-15 DIAGNOSIS — Z79.01 CHRONIC ANTICOAGULATION: ICD-10-CM

## 2025-05-15 DIAGNOSIS — I48.0 PAROXYSMAL ATRIAL FIBRILLATION (MULTI): Primary | ICD-10-CM

## 2025-05-15 PROCEDURE — 93000 ELECTROCARDIOGRAM COMPLETE: CPT | Performed by: INTERNAL MEDICINE

## 2025-05-15 RX ORDER — FLECAINIDE ACETATE 50 MG/1
75 TABLET ORAL 2 TIMES DAILY
Qty: 90 TABLET | Refills: 11 | Status: SHIPPED | OUTPATIENT
Start: 2025-05-15 | End: 2026-05-15

## 2025-05-15 NOTE — PROGRESS NOTES
"Electrophysiology Office Visit     CHIEF COMPLAINT  Chief Complaint   Patient presents with    Follow-up     3 month follow up discuss echo   Murmur, heart  Paroxysmal atrial fibrillation (Multi)        Primary EP doctor: Dr Abraham  Primary Cardiologist: Dr Tirado    EP History: Persistent AF, sinus node dysfunction, HTN  12/7/2022 Dx w/ AF by PCP, Dr Dasilva. Placed on Eliquis. Referred to cardiology, Dr Tirado.   2/24/2023 DCCV  3/14/2023 EP OV new pt visit. \" I prefer to continue with restoration to sinus rhythm. The only problem that I have is he has also evidence of sinus node dysfunction. Prefer to try dofetilide to control his arrhythmia and hopefully we do not develop significant bradycardia.\"  4/4/2023 - 4/6/2023 Drug load w/ Dofetilide   12/2024 Dofetilide increased  Maintained on dofetilide 500 mcg twice daily, magnesium 400 mg daily, Eliquis 5 mg twice daily    HPI: 5/15/2025  69 year old male pmhx persistent AF, sinus node dysfunction, HTN. Maintained on dofetilide 500 mcg twice daily, magnesium 400 mg daily, Eliquis 5 mg twice daily.    He brings in a hand held EKG device and a log of all this irregular rhythms for about 6 weeks. He is in AF almost all the time. His dofetilide 500mg BID is not controlling his AF, however pt is asymptomatic.     He has never tried any other antiarrhythmic. We discussed trying flecainide. I conferred with Dr Abraham and he agrees to have patient discontinue dofetilide for 2 days and then start Flecainide. Flecainide will be started on Sunday, May 18th and he will get EKG on Monday and Tuesday to check QRS and QTC.    Today's EKG Interpretation: AF, rate 69 bpm, qrs 86bpm, qtc 454ms    VITALS  Vitals:    05/15/25 1303   BP: 120/66   Pulse: 60     Wt Readings from Last 4 Encounters:   05/15/25 95.3 kg (210 lb)   03/12/25 95.3 kg (210 lb)   02/19/25 95.3 kg (210 lb)   01/15/25 95.3 kg (210 lb)     PHYSICAL EXAM:  GENERAL:  Well developed, well nourished, in no acute " distress.  NEURO/PSYCH:  No focal deficits. A&O x 3   LUNGS:  Clear to auscultation bilaterally. No wheezing, rhonci, or crackles  HEART:  IRREGULAR, no M/R/G.   EXTREMITIES:  Warm with good color, no clubbing or cyanosis.  No pitting edema.     Medical History[1]  Surgical History[2]  Social History[3]  Family History[4]  RX Allergies[5]   Current Outpatient Medications   Medication Instructions    apixaban (ELIQUIS) 5 mg, oral, 2 times daily    ferrous sulfate (IRON ORAL) 45 mg, As needed    flecainide (TAMBOCOR) 75 mg, oral, 2 times daily, Start on Sunday, May 18th    lisinopril 20 mg, oral, Daily    magnesium oxide (Mag-Ox) 400 mg (241.3 mg magnesium) tablet 1 tablet, Daily    simvastatin (ZOCOR) 40 mg, oral, Nightly     Relevant reports:   3/12/2025 ECHO   1. Left ventricular ejection fraction is normal, calculated by Peck's biplane at 59%.   2. Left ventricular diastolic filling was indeterminate due to atrial fibrillation/flutter.   3. Intact intraventricular septum without shunting or a ventricular septal defect.   4. No left ventricular thrombus visualized.   5. There is normal right ventricular global systolic function.   6. The left atrial size is moderately dilated.   7. No evidence of mitral valve prolapse.   8. There is No tricuspid stenosis.   9. Slightly elevated right ventricular systolic pressure.  10. Mild aortic valve regurgitation.  11. No significant change.    1/24/2023 ECHO   1. Left ventricular systolic function is normal with a 60-65% estimated ejection fraction.   2. Borderline left ventricular hypertrophy. Diastolic function is indeterminate due to rhythm, but TDI is within normal limits.   3. Right ventricle has normal function, but appears to be mildly dilated.   4. Mildly dilated left atrium.   5. Mild to moderate mitral insufficiency with central jet.   6. Mild tricuspid insufficiency with estimated RVSP being mildly increased at 44 mmHg.   7. Trileaflet aortic valve with mild  aortic insufficiency. Central jet.   8. Aortic root measures normal, and proximal ascending thoracic aorta measures upper limits of normal in size.   9. Compared to the report 2015, there is probably not much change over the mitral insufficiency may have worsened to a small degree. There was no comment about atrial fibrillation on that study.    1/24/2023 Stress Test   1. Very low functional capacity for age at 4.6 MET, limited by generalized and leg fatigue.   2. Resting a.fib rate 99 with rapid increase to 139 bpm at low level of exercise.   3. Non specific inferior lead st abnormality, no clear critieria for ischemia.   4. HR,BP product with no significant stt abnormality suggests he is not in high risk group for critical CAD.   5. The adequate level of stress was achieved.    11/22/2024 Holter x 24 hr  1. The exclusive rhythm was atrial fibrillation/flutter, with a ventricular response ranging from 35 to 140 bpm, with an average of 67 bpm.  2. A total of 2389 wide-complex beats were noted, accounting for 2.5% of the total QRS complexes.  There were most likely aberrant complexes, occurring frequently during atrial flutter with 2:1 AV conduction.  There were up to 7 consecutive wide-complex beats, not felt to represent true ventricular tachycardia.  3.  Rare isolated premature ventricular complexes were noted, with occasional ventricular couplets and triplets but no runs of ventricular tachycardia.  4. No symptoms were reported during the recording session.  IMPRESSION: Abnormal Holter monitor study, with a 100% burden of atrial fibrillation/flutter, and a reasonable overall rate control    1/24/2023 24-hour Holter monitor done request of Dr. Tirado because atrial fibrillation and palpitations.  Basic rhythm is atrial fibrillation throughout. Minimum heart rate 36 bpm--in the early morning hours, maximum heart rate is 141 bpm, and average heart rate was 79 bpm for the 24-hour period. Normal heart rate  variation   Rare PVCs, with 1 ventricular triplet and 1 ventricular couplet noted   Patient had 186 pauses greater than 2 seconds, the overwhelming majority of these during the sleeping hours. Longest pause was 3.1 seconds in the early morning hours   ST changes were either unremarkable or nonspecific in nature   This patient voices no complaints on his diary    Problem List[6]     ASSESSMENT AND PLAN  Problem List Items Addressed This Visit       Paroxysmal atrial fibrillation (Multi) - Primary  He brings in a hand held EKG device and a log of all this irregular rhythms for about 6 weeks. He is in AF almost all the time. His dofetilide 500mg BID is not controlling his AF, however pt is asymptomatic.     He has never tried any other antiarrhythmic. We discussed trying flecainide. I conferred with Dr Abraham and he agrees to have patient discontinue dofetilide for 2 days and then start Flecainide. Flecainide will be started on Sunday, May 18th and he will get EKG on Monday and Tuesday to check QRS and QTC.    Follow up with Dr Abraham in 1 month    Relevant Medications    flecainide (Tambocor) 50 mg tablet    Other Relevant Orders    Follow Up In Cardiology    Chronic anticoagulation  Continue BLANE Goldstein, PA-C               [1]   Past Medical History:  Diagnosis Date    Anemia     Arrhythmia     Cataract     Encounter for general adult medical examination without abnormal findings 10/11/2021    Encounter for Medicare annual wellness exam    Encounter for general adult medical examination without abnormal findings 05/18/2022    Medicare annual wellness visit, subsequent    Encounter for general adult medical examination without abnormal findings 10/14/2020    Encounter for Medicare annual wellness exam    Encounter for immunization 10/14/2020    Encounter for immunization    Encounter for screening for malignant neoplasm of colon 05/18/2022    Colon cancer screening    Encounter for screening for  malignant neoplasm of colon 10/14/2020    Colon cancer screening    Encounter for screening for malignant neoplasm of prostate 09/17/2019    Screening PSA (prostate specific antigen)    Encounter for screening for malignant neoplasm of prostate 05/18/2022    Screening PSA (prostate specific antigen)    Encounter for screening for other disorder 05/18/2022    Special screening for other conditions    GI (gastrointestinal bleed)     History of blood transfusion     Hordeolum externum left eye, unspecified eyelid 05/17/2020    Hordeolum externum of left eye, unspecified eyelid    Hyperlipidemia     Hypertension     Other specified disorders of eye and adnexa 05/17/2020    Redness of eye, left    Other specified disorders of nose and nasal sinuses 12/14/2020    Sinus drainage    Personal history of other diseases of the nervous system and sense organs 05/17/2020    History of conjunctivitis    Personal history of other drug therapy 10/11/2021    History of influenza vaccination   [2]   Past Surgical History:  Procedure Laterality Date    BACK SURGERY      CARDIOVERSION      CATARACT EXTRACTION      COLON SURGERY  03/2024    small bowel   [3]   Social History  Tobacco Use    Smoking status: Never    Smokeless tobacco: Never   Vaping Use    Vaping status: Never Used   Substance Use Topics    Alcohol use: Not Currently    Drug use: Never   [4]   Family History  Problem Relation Name Age of Onset    Other (cardiac arrhythmia) Father     [5] No Known Allergies  [6]   Patient Active Problem List  Diagnosis    Abnormal EKG    Arthritis    BPH (benign prostatic hyperplasia)    Chronic kidney disease, stage 3a (Multi)    Eczema    Fatigue    HTN (hypertension)    Hypercholesterolemia    Irregular heart beat    Low iron    Lower back pain    Nonrheumatic mitral valve regurgitation    Paroxysmal atrial fibrillation (Multi)    Positive colorectal cancer screening using Cologuard test    Primary osteoarthritis involving multiple  joints    Psoriasis    Red blood cell abnormality    Sinus node dysfunction (Multi)    Snoring    Abdominal pain    Abnormal echocardiogram    Coronary artery calcification seen on CAT scan    SERGIO (obstructive sleep apnea)    Peripheral neuropathy    Small bowel obstruction (Multi)    Essential hypertension    Hyperlipidemia    Chronic back pain    Atrial fibrillation (Multi)    Chronic anticoagulation    Class 1 obesity with body mass index (BMI) of 30.0 to 30.9 in adult    High risk medication use    Small bowel obstruction due to adhesions (Multi)    BMI 29.0-29.9,adult    Prostate cancer screening    Never smoked cigarettes

## 2025-05-15 NOTE — PATIENT INSTRUCTIONS
Great to see you today.   Since you are in continuous A-fib we are going to start a different medication.  Do not take any more dofetilide.  Start flecainide on Sunday.  Come in the office on Monday and Tuesday morning for EKGs.  Follow-up with Dr. Abraham in 1 month.  Please take your medications and or do life style behavior modifications as discussed.     Please call if you have any questions or concerns.  Please go to emergency department if you have abrupt onset of chest, shortness of breath, light headedness or dizziness.

## 2025-05-19 ENCOUNTER — APPOINTMENT (OUTPATIENT)
Dept: CARDIOLOGY | Facility: CLINIC | Age: 70
End: 2025-05-19
Payer: MEDICARE

## 2025-05-19 DIAGNOSIS — I48.0 PAROXYSMAL ATRIAL FIBRILLATION (MULTI): ICD-10-CM

## 2025-05-19 PROCEDURE — 93000 ELECTROCARDIOGRAM COMPLETE: CPT | Performed by: INTERNAL MEDICINE

## 2025-05-19 PROCEDURE — 99211 OFF/OP EST MAY X REQ PHY/QHP: CPT | Performed by: INTERNAL MEDICINE

## 2025-05-19 NOTE — PROGRESS NOTES
Pt here for EKG ordered by YULIA Escamilla/Jarad for med adjustment. Per Darlene, pt in afib, QT/QTC, and QRS OK. Pt to have repeat INR tomorrow.

## 2025-05-20 ENCOUNTER — APPOINTMENT (OUTPATIENT)
Dept: CARDIOLOGY | Facility: CLINIC | Age: 70
End: 2025-05-20
Payer: MEDICARE

## 2025-05-20 DIAGNOSIS — I48.0 PAROXYSMAL ATRIAL FIBRILLATION (MULTI): ICD-10-CM

## 2025-05-20 PROCEDURE — 93000 ELECTROCARDIOGRAM COMPLETE: CPT | Performed by: INTERNAL MEDICINE

## 2025-05-20 NOTE — PROGRESS NOTES
Pt here for EKG ordered by YULIA Escamilla/Dr Abraham for med change, afib. Per Darlene, pt in afib, measurements ok. Pt to continue same medications and keep follow up appt with Dr Abraham on 6/18 to discuss possibility of increasing medication and/or cardioversion.

## 2025-05-28 ENCOUNTER — APPOINTMENT (OUTPATIENT)
Dept: CARDIOLOGY | Facility: CLINIC | Age: 70
End: 2025-05-28
Payer: MEDICARE

## 2025-05-28 VITALS
HEART RATE: 69 BPM | HEIGHT: 71 IN | BODY MASS INDEX: 29.71 KG/M2 | WEIGHT: 212.2 LBS | DIASTOLIC BLOOD PRESSURE: 72 MMHG | SYSTOLIC BLOOD PRESSURE: 137 MMHG

## 2025-05-28 DIAGNOSIS — E78.00 HYPERCHOLESTEROLEMIA: ICD-10-CM

## 2025-05-28 DIAGNOSIS — I34.0 NONRHEUMATIC MITRAL VALVE REGURGITATION: ICD-10-CM

## 2025-05-28 DIAGNOSIS — I48.0 PAROXYSMAL ATRIAL FIBRILLATION (MULTI): ICD-10-CM

## 2025-05-28 DIAGNOSIS — I10 PRIMARY HYPERTENSION: ICD-10-CM

## 2025-05-28 DIAGNOSIS — I49.5 SINUS NODE DYSFUNCTION (MULTI): ICD-10-CM

## 2025-05-28 PROCEDURE — 3008F BODY MASS INDEX DOCD: CPT | Performed by: INTERNAL MEDICINE

## 2025-05-28 PROCEDURE — 3078F DIAST BP <80 MM HG: CPT | Performed by: INTERNAL MEDICINE

## 2025-05-28 PROCEDURE — 1159F MED LIST DOCD IN RCRD: CPT | Performed by: INTERNAL MEDICINE

## 2025-05-28 PROCEDURE — 3075F SYST BP GE 130 - 139MM HG: CPT | Performed by: INTERNAL MEDICINE

## 2025-05-28 PROCEDURE — 99214 OFFICE O/P EST MOD 30 MIN: CPT | Performed by: INTERNAL MEDICINE

## 2025-05-28 NOTE — PROGRESS NOTES
Patient:  Vic Wise  YOB: 1955  MRN: [id number]     Chief Complaint   Patient presents with    Follow-up     Six month follow up        HPI:       Vic Wise is a 69 y.o. male who returns today for cardiac follow-up.   He was seen on December 28, 2022 for newly diagnosed atrial fibrillation. He does not have any history of atherosclerotic heart or valvular heart disease. He was diagnosed with hypertension more than 20 years ago. His blood pressures have been well controlled with lisinopril. He has hyperlipidemia for which he takes simvastatin. No history of diabetes mellitus. He has never smoked. He has a history of some iron deficiency anemia related to hemorrhoids.     He was noted during a routine office visit on December 7, 2022 with Dr. Dasilva to have atrial fibrillation with a heart rate of 78 bpm. No significant ST changes. He had been in the office 6 months prior to that and had a regular rhythm. He was essentially asymptomatic. He reported a very brief episode of palpitations on [place] Day. He was started on Eliquis 5 mg twice daily.  He has a [id number] score of 2.  His heart rates have been controlled without any negative chronotropic agents suggesting an element of underlying conduction disease.      An echocardiogram on January 25, 2023 showed normal LV systolic function. Estimated LV ejection fraction of 60 to 65%. There was mild RV enlargement with normal RV systolic function. Mild left atrial enlargement. 2+ mitral regurgitation and 1+ tricuspid regurgitation. Estimated RVSP was 44 mmHg. Very similar to previous study in 2015. An exercise treadmill test on January 25, 2020 was negative for significant ST changes. He achieved 4.6 METS and 104% of maximally predicted heart rate. Resting heart rate was 99 bpm with rapid increase to 139 bpm. A 24-hour Holter monitor on January 24, 2023 showed atrial fibrillation throughout with an average heart rate 79 bpm. Heart rates ranged from  36 bpm in the early morning hours to a maximal heart rate of 141 beats minute. He had 186 pauses of greater than 2 seconds. The overwhelming majority occurred during sleeping hours. Longest pause was 3.1 seconds. Lab studies dated December 28, 2022 showed a normal CBC and CMP except for glucose 129. TSH was normal. Magnesium level was 1.74.      He underwent external electrocardioversion to normal sinus rhythm by Dr. Abraham on February 24, 2023. He was noted on a follow-up visit with Dr. Abraham on March 14, 2023 to be back in atrial fibrillation. Heart rate 61 bpm. Dr. Abraham recommended admission for drug loading with dofetilide due to underlying sinus node dysfunction. He was admitted April 4, 2023 and started on dofetilide.  In the early morning hours of April 5, 2023 he converted to normal sinus rhythm. He was noted to have low magnesium levels and was give IV Mg Sulfate and was started on Mg Oxide.      He developed progressively worsening abdominal discomfort and was diagnosed with a partial small bowel obstruction secondary to adhesions.  He underwent exploratory laparotomy, partial small bowel resection, and lysis of adhesions on December 11, 2023.  He noted that the surgery took a toll on him and it took him several weeks to fully recover.      He developed recurrent atrial fibrillation in December 2024.  His dofetilide dose was increased to 500 mcg twice daily.  An EKG January 2, 2025 showed normal sinus rhythm.  At the time of his follow-up visit with Dr. Abraham February 19, 2025 a repeat EKG also showed normal sinus rhythm.  QTc 417 ms.  A follow-up echocardiogram March 12, 2025 showed normal LV function with estimated LV ejection fraction of 55 to 60%.  Mild mitral and tricuspid regurgitation.  Estimated RVSP 36 mmHg.  He has since developed recurrent atrial fibrillation.  Dr. Abraham recommended that dofetilide be discontinued.  He is now on flecainide 75 mg twice daily.  He has only been taking this for a week  or so.  He has follow-up with EP scheduled next month.    He generally has been feeling well.  He has a [employer] mobile device that continues to show a persistent irregular rhythm.  He denies any chest pain or shortness of breath. He denies any orthopnea, PND, or increasing peripheral edema. He denies any lightheadedness, near-syncope, or syncope. He denies any fever, chills or cough. He denies any nausea, vomiting, or diaphoresis. He denies any hemoptysis, hematemesis, melena, or hematochezia.     His blood pressures are well-controlled.  He does not have any anginal or CHF symptoms.  We discussed the importance of continuing long-term anticoagulation with Eliquis.  Lab studies  November 14, 2024 showed a hemoglobin 10.9 and hematocrit 37.5.  Comprehensive metabolic profile normal with exception of glucose 126.  Cholesterol 130 with HDL 36, LDL 74, and triglycerides 98.  Hemoglobin A1c 7.3. Other details as noted below.      Objective:     Vitals:    05/28/25 1439   BP: 158/65   Pulse: 69         Wt Readings from Last 4 Encounters:   05/15/25 95.3 kg (210 lb)   03/12/25 95.3 kg (210 lb)   02/19/25 95.3 kg (210 lb)   01/15/25 95.3 kg (210 lb)       Allergies:     No Known Allergies       Medications:     Current Outpatient Medications   Medication Instructions    apixaban (ELIQUIS) 5 mg, oral, 2 times daily    ferrous sulfate (IRON ORAL) 45 mg, As needed    flecainide (TAMBOCOR) 75 mg, oral, 2 times daily, Start on Sunday, May 18th    lisinopril 20 mg, oral, Daily    magnesium oxide (Mag-Ox) 400 mg (241.3 mg magnesium) tablet 1 tablet, Daily    simvastatin (ZOCOR) 40 mg, oral, Nightly       Physical Examination:   GENERAL:  Well developed, well nourished, in no acute distress.  CHEST:  Symmetric and nontender.  NEURO/PSYCH:  Alert and oriented times three with approppriate behavior and responses.  NECK:  Supple, no JVD, no bruit.  LUNGS:  Clear to auscultation bilaterally, normal respiratory effort.  HEART:  Rate and  rhythm irregularly irregular with no evident murmur, no gallop appreciated.        There are no rubs, clicks or heaves.  EXTREMITIES:  Warm with good color, no clubbing or cyanosis.  There is no edema noted.  PERIPHERAL VASCULAR:  Pulses present and equally palpable; 2+ throughout.      Lab:     CBC:   Lab Results   Component Value Date    WBC 4.2 (L) 11/14/2024    RBC 4.71 11/14/2024    HGB 10.9 (L) 11/14/2024    HCT 37.5 (L) 11/14/2024     11/14/2024        CMP:    Lab Results   Component Value Date     11/14/2024    K 4.9 11/14/2024     11/14/2024    CO2 27 11/14/2024    BUN 15 11/14/2024    CREATININE 1.16 11/14/2024    GLUCOSE 126 (H) 11/14/2024    CALCIUM 9.0 11/14/2024       Lipid Profile:    Lab Results   Component Value Date    TRIG 98 11/14/2024    HDL 36.3 11/14/2024    LDLCALC 74 11/14/2024       BMP:  Lab Results   Component Value Date     11/14/2024     12/15/2023     12/14/2023    K 4.9 11/14/2024    K 4.1 12/15/2023    K 4.3 12/14/2023     11/14/2024    CL 99 12/15/2023     12/14/2023    CO2 27 11/14/2024    CO2 27 12/15/2023    CO2 28 12/14/2023    BUN 15 11/14/2024    BUN 26 (H) 12/15/2023    BUN 18 12/14/2023    CREATININE 1.16 11/14/2024    CREATININE 1.27 12/15/2023    CREATININE 1.18 12/14/2023       CBC:  Lab Results   Component Value Date    WBC 4.2 (L) 11/14/2024    WBC 9.3 12/17/2023    WBC 7.4 12/16/2023    RBC 4.71 11/14/2024    RBC 4.71 12/17/2023    RBC 4.03 (L) 12/16/2023    HGB 10.9 (L) 11/14/2024    HGB 12.9 (L) 12/17/2023    HGB 11.1 (L) 12/16/2023    HCT 37.5 (L) 11/14/2024    HCT 40.4 (L) 12/17/2023    HCT 35.3 (L) 12/16/2023    MCV 80 11/14/2024    MCV 86 12/17/2023    MCV 88 12/16/2023    MCH 23.1 (L) 11/14/2024    MCH 27.4 12/17/2023    MCH 27.5 12/16/2023    MCHC 29.1 (L) 11/14/2024    MCHC 31.9 (L) 12/17/2023    MCHC 31.4 (L) 12/16/2023    RDW 18.4 (H) 11/14/2024    RDW 14.8 (H) 12/17/2023    RDW 15.2 (H) 12/16/2023      11/14/2024     12/17/2023     12/16/2023    MPV 10.0 10/24/2023       Hepatic Function Panel:    Lab Results   Component Value Date    ALKPHOS 63 11/14/2024    ALT 10 11/14/2024    AST 13 11/14/2024    PROT 6.4 11/14/2024    BILITOT 0.5 11/14/2024       HgBA1c:    Lab Results   Component Value Date    HGBA1C 7.3 (H) 11/14/2024       Magnesium:    Lab Results   Component Value Date    MG 2.13 12/17/2023       TSH:    Lab Results   Component Value Date    TSH 1.21 04/04/2023       PT/INR:    Lab Results   Component Value Date    PROTIME 12.9 (H) 11/28/2023    INR 1.1 11/28/2023       Cardiac Enzymes:    Lab Results   Component Value Date    TROPHS 6 07/20/2023    TROPHS 7 07/20/2023       Problem List:     Patient Active Problem List   Diagnosis    Abnormal EKG    Arthritis    BPH (benign prostatic hyperplasia)    Chronic kidney disease, stage 3a (Multi)    Eczema    Fatigue    HTN (hypertension)    Hypercholesterolemia    Irregular heart beat    Low iron    Lower back pain    Nonrheumatic mitral valve regurgitation    Paroxysmal atrial fibrillation (Multi)    Positive colorectal cancer screening using Cologuard test    Primary osteoarthritis involving multiple joints    Psoriasis    Red blood cell abnormality    Sinus node dysfunction (Multi)    Snoring    Abdominal pain    Abnormal echocardiogram    Coronary artery calcification seen on CAT scan    SERGIO (obstructive sleep apnea)    Peripheral neuropathy    Small bowel obstruction (Multi)    Essential hypertension    Hyperlipidemia    Chronic back pain    Atrial fibrillation (Multi)    Chronic anticoagulation    Class 1 obesity with body mass index (BMI) of 30.0 to 30.9 in adult    High risk medication use    Small bowel obstruction due to adhesions (Multi)    BMI 29.0-29.9,adult    Prostate cancer screening    Never smoked cigarettes       Asessment:     Problem List Items Addressed This Visit           ICD-10-CM    HTN (hypertension) I10    Relevant  Orders    Follow Up In Cardiology    [facility]    Comprehensive metabolic panel    Lipid panel    Hypercholesterolemia E78.00    Relevant Orders    Follow Up In Cardiology    [facility]    Comprehensive metabolic panel    Lipid panel    Nonrheumatic mitral valve regurgitation I34.0    Relevant Orders    Follow Up In Cardiology    [facility]    Comprehensive metabolic panel    Lipid panel    Paroxysmal atrial fibrillation (Multi) I48.0    Relevant Medications    apixaban (Eliquis) 5 mg tablet    Other Relevant Orders    Follow Up In Cardiology    [facility]    Comprehensive metabolic panel    Lipid panel    Sinus node dysfunction (Multi) I49.5    Relevant Orders    Follow Up In Cardiology    [facility]    Comprehensive metabolic panel    Lipid panel       Scribe Attestation  By signing my name below, I, Estelle Meade MA   , Scribe   attest that this documentation has been prepared under the direction and in the presence of Donell Tirado MD.     Provider Attestation - Scribe documentation    The above portion of this note was dictated by me using voice recognition software.  All medical record entries made by the scribe were at my direction.  The scribe entering the documentation was in my presence.   I have reviewed the chart and agree that the record accurately reflects my personal performance of the history, physical exam, discussion and plan.

## 2025-05-28 NOTE — PATIENT INSTRUCTIONS
HAVE LABS DONE SOON (FASTING LABS)     Please bring all medicines, vitamins, and herbal supplements with you in original bottles to every appointment!!!!    Prescriptions will not be filled unless you are compliant with your follow up appointments or have a follow up appointment scheduled as per instruction of your physician. Refills should be requested at the time of your visit.

## 2025-05-29 DIAGNOSIS — I48.0 PAROXYSMAL ATRIAL FIBRILLATION (MULTI): ICD-10-CM

## 2025-05-29 DIAGNOSIS — I10 HYPERTENSION, UNSPECIFIED TYPE: ICD-10-CM

## 2025-05-30 RX ORDER — LISINOPRIL 20 MG/1
20 TABLET ORAL DAILY
Qty: 30 TABLET | Refills: 0 | Status: SHIPPED | OUTPATIENT
Start: 2025-05-30

## 2025-06-05 RX ORDER — APIXABAN 5 MG/1
5 TABLET, FILM COATED ORAL 2 TIMES DAILY
Qty: 180 TABLET | Refills: 3 | OUTPATIENT
Start: 2025-06-05

## 2025-06-18 ENCOUNTER — APPOINTMENT (OUTPATIENT)
Dept: CARDIOLOGY | Facility: CLINIC | Age: 70
End: 2025-06-18
Payer: MEDICARE

## 2025-06-18 VITALS
HEART RATE: 61 BPM | HEIGHT: 71 IN | BODY MASS INDEX: 29.12 KG/M2 | WEIGHT: 208 LBS | SYSTOLIC BLOOD PRESSURE: 122 MMHG | DIASTOLIC BLOOD PRESSURE: 68 MMHG

## 2025-06-18 DIAGNOSIS — R94.31 ABNORMAL EKG: ICD-10-CM

## 2025-06-18 DIAGNOSIS — Z79.01 ANTICOAGULATION MANAGEMENT ENCOUNTER: ICD-10-CM

## 2025-06-18 DIAGNOSIS — I48.0 PAROXYSMAL ATRIAL FIBRILLATION (MULTI): Primary | ICD-10-CM

## 2025-06-18 DIAGNOSIS — I49.5 SINUS NODE DYSFUNCTION (MULTI): ICD-10-CM

## 2025-06-18 DIAGNOSIS — I10 PRIMARY HYPERTENSION: ICD-10-CM

## 2025-06-18 DIAGNOSIS — Z79.899 HIGH RISK MEDICATION USE: ICD-10-CM

## 2025-06-18 DIAGNOSIS — Z01.818 PREOP TESTING: ICD-10-CM

## 2025-06-18 DIAGNOSIS — Z51.81 ANTICOAGULATION MANAGEMENT ENCOUNTER: ICD-10-CM

## 2025-06-18 PROCEDURE — 1159F MED LIST DOCD IN RCRD: CPT | Performed by: INTERNAL MEDICINE

## 2025-06-18 PROCEDURE — 1036F TOBACCO NON-USER: CPT | Performed by: INTERNAL MEDICINE

## 2025-06-18 PROCEDURE — 3074F SYST BP LT 130 MM HG: CPT | Performed by: INTERNAL MEDICINE

## 2025-06-18 PROCEDURE — 99215 OFFICE O/P EST HI 40 MIN: CPT | Performed by: INTERNAL MEDICINE

## 2025-06-18 PROCEDURE — 93000 ELECTROCARDIOGRAM COMPLETE: CPT | Performed by: INTERNAL MEDICINE

## 2025-06-18 PROCEDURE — 3008F BODY MASS INDEX DOCD: CPT | Performed by: INTERNAL MEDICINE

## 2025-06-18 PROCEDURE — 3078F DIAST BP <80 MM HG: CPT | Performed by: INTERNAL MEDICINE

## 2025-06-18 ASSESSMENT — ENCOUNTER SYMPTOMS
PALPITATIONS: 0
DYSPNEA ON EXERTION: 0

## 2025-06-18 NOTE — H&P (VIEW-ONLY)
CARDIOLOGY OFFICE VISIT      CHIEF COMPLAINT  Chief Complaint   Patient presents with    Follow-up     1 month follow up for Paroxysmal atrial fibrillation and Sinus node dysfunction.       HISTORY OF PRESENT ILLNESS  HPI  70-year-old male with a past medical history of hypertension, hyperlipidemia. Patient noticed some palpitations and fatigue back in December 2022. He was diagnosed of atrial fibrillation by primary care physician. He was placed on Eliquis therapy.     An echocardiogram January 2023 shows normal left ventricular function value of 60 to 65% with 2+ mitral regurgitation 1+ tricuspid regurgitation. Exercise treadmill stress test in January 2022 was negative for significant ST changes. He achieved 4.6 METS and 104% maximal heart rate predicted. Holter monitoring January 2023 shows underlying rhythm of atrial fibrillation with a maximal heart rate of 141 bpm average heart rate of 79 bpm minimum heart rate of 36 bpm. There were frequent pauses up to 3.1 seconds of duration most of the time during sleep time.     Patient underwent cardioversion in February 24, 2023 with successful restoration to sinus rhythm.     he had recurrence of atrial fibrillation. Patient was admitted for drug load with high risk medication (dofetilide). He converted during hospitalization.     Patient was admitted at [facility] in December 2023.  He was admitted to the regular nursing floor after departing the PACU after undergoing an exploratory laparotomy lysis of adhesions small bowel resection with primary anastomosis. His postop course was relatively smooth although longer than typical. Given the patient's past history it was important for him to have bowel function prior to discharge from the hospital. It took until postop day 5 before he started with BMs and flatus. During this period of time he was able to tolerate liquid however small amounts.      Patient was seen by cardiology service in November 2024 and he  was found to be back in atrial fibrillation.     Patient was seen my office in December 2024.  He was still in atrial fibrillation.  The dose of dofetilide was increased to 500 mcg twice a day.  Subsequent EKG in January 2, 2025 patient was in sinus rhythm.    During the last office visit, he was doing well.  We decided to leave on rate control strategy.    Patient was reevaluated my office in May 2025.  He was complaining of occasional palpitations.  After discussion regarding atrial fibrillation management, dofetilide was discontinued.  Patient was placed on flecainide.  He is currently using flecainide at a dose of 75 mg 1 tablet twice a day.    Patient states that so far he has been doing well.  Denies any symptoms like chest pain but he is monitoring his heart rate at home and all of the underlying irregular-atrial fibrillation.    EKG performed today shows atrial fibrillation with a rate of 61 bpm QRS duration 94 ms QT corrected 414 ms.  Rhythm strip shows the same pattern.      Past Medical History  Medical History[1]    Social History  Social History[2]    Family History   Family History[3]     Allergies:  RX Allergies[4]     Outpatient Medications:  Current Outpatient Medications   Medication Instructions    apixaban (ELIQUIS) 5 mg, oral, 2 times daily    ferrous sulfate (IRON ORAL) 45 mg, As needed    flecainide (TAMBOCOR) 75 mg, oral, 2 times daily, Start on [place], May 18th    lisinopril 20 mg, oral, Daily    magnesium oxide (Mag-Ox) 400 mg (241.3 mg magnesium) tablet 1 tablet, Daily    simvastatin (ZOCOR) 40 mg, oral, Nightly          REVIEW OF SYSTEMS  Review of Systems   Cardiovascular:  Negative for chest pain, dyspnea on exertion and palpitations.   All other systems reviewed and are negative.        VITALS  Vitals:    06/18/25 1448   BP: 122/68   Pulse: 61       PHYSICAL EXAM  Constitutional:       General: Awake.      Appearance: Normal and healthy appearance. Well-developed and not in distress.    Neck:      Vascular: No JVR. JVD normal.   Pulmonary:      Effort: Pulmonary effort is normal.      Breath sounds: Normal breath sounds. No wheezing. No rhonchi. No rales.   Chest:      Chest wall: Not tender to palpatation.   Cardiovascular:      PMI at left midclavicular line. Normal rate. Irregularly irregular rhythm. Normal S1. Normal S2.       Murmurs: There is no murmur.      No gallop.  No click. No rub.      Comments: Atrial fibrillation   Pulses:     Intact distal pulses.   Edema:     Peripheral edema absent.   Abdominal:      Tenderness: There is no abdominal tenderness.   Musculoskeletal: Normal range of motion.         General: No tenderness. Skin:     General: Skin is warm and dry.   Neurological:      General: No focal deficit present.      Mental Status: Alert and oriented to person, place and time.           ASSESSMENT AND PLAN  Clinical impression.     1. Persistent atrial fibrillation  2. Evidence of sinus node dysfunction  3. Hypertension  4. Hyperlipidemia  5. Normal left ventricular function per echogram in January 2023  6. No evidence of ischemia per stress test in 2023  7. Long-term anticoagulant therapy with Eliquis.  8. High risk medication (dofetilide).  Patient has been off dofetilide since May 2025.  Currently on flecainide therapy    Plan recommendation    Patient is using a different rhythm therapy.  Will attempt cardioversion. Procedure, risk, benefits and possible complications were explained to patient.  All questions were answered.  Patient agrees with plan.  Informed consent was signed.    Patient states that he would like to avoid ablation therapy if possible.  Will try only antiarrhythmic therapy.    Continue anticoagulant therapy with Eliquis.    Follow my office 4 to 6 weeks post cardioversion or sooner if needed.    Risk factor modification and lifestyle modification discussed with patient. Diet , exercise and hydration discussed with patient.    I have personally review  with patient during this office visit, laboratory data, echocardiogram results, stress test results, Holter-event monitor results prior and after the last electrophysiology visit. All questions has been answered.    Please excuse any errors in grammar or translation related to this dictation.  Voice recognition software was utilized to prepare this document.        I, Dr. Abraham, personally performed the services described in the documentation as scribed by the nurse in my presence, and confirm it is both accurate and complete.            [1]   Past Medical History:  Diagnosis Date    Anemia     Arrhythmia     Cataract     Encounter for general adult medical examination without abnormal findings 10/11/2021    Encounter for Medicare annual wellness exam    Encounter for general adult medical examination without abnormal findings 05/18/2022    Medicare annual wellness visit, subsequent    Encounter for general adult medical examination without abnormal findings 10/14/2020    Encounter for Medicare annual wellness exam    Encounter for immunization 10/14/2020    Encounter for immunization    Encounter for screening for malignant neoplasm of colon 05/18/2022    Colon cancer screening    Encounter for screening for malignant neoplasm of colon 10/14/2020    Colon cancer screening    Encounter for screening for malignant neoplasm of prostate 09/17/2019    Screening PSA (prostate specific antigen)    Encounter for screening for malignant neoplasm of prostate 05/18/2022    Screening PSA (prostate specific antigen)    Encounter for screening for other disorder 05/18/2022    Special screening for other conditions    GI (gastrointestinal bleed)     History of blood transfusion     Hordeolum externum left eye, unspecified eyelid 05/17/2020    Hordeolum externum of left eye, unspecified eyelid    Hyperlipidemia     Hypertension     Other specified disorders of eye and adnexa 05/17/2020    Redness of eye, left    Other specified  disorders of nose and nasal sinuses 12/14/2020    Sinus drainage    Personal history of other diseases of the nervous system and sense organs 05/17/2020    History of conjunctivitis    Personal history of other drug therapy 10/11/2021    History of influenza vaccination   [2]   Social History  Tobacco Use    Smoking status: Never    Smokeless tobacco: Never   Vaping Use    Vaping status: Never Used   Substance Use Topics    Alcohol use: Not Currently    Drug use: Never   [3]   Family History  Problem Relation Name Age of Onset    Other (cardiac arrhythmia) Father     [4] No Known Allergies

## 2025-06-18 NOTE — PROGRESS NOTES
CARDIOLOGY OFFICE VISIT      CHIEF COMPLAINT  Chief Complaint   Patient presents with    Follow-up     1 month follow up for Paroxysmal atrial fibrillation and Sinus node dysfunction.       HISTORY OF PRESENT ILLNESS  HPI  70-year-old male with a past medical history of hypertension, hyperlipidemia. Patient noticed some palpitations and fatigue back in December 2022. He was diagnosed of atrial fibrillation by primary care physician. He was placed on Eliquis therapy.     An echocardiogram January 2023 shows normal left ventricular function value of 60 to 65% with 2+ mitral regurgitation 1+ tricuspid regurgitation. Exercise treadmill stress test in January 2022 was negative for significant ST changes. He achieved 4.6 METS and 104% maximal heart rate predicted. Holter monitoring January 2023 shows underlying rhythm of atrial fibrillation with a maximal heart rate of 141 bpm average heart rate of 79 bpm minimum heart rate of 36 bpm. There were frequent pauses up to 3.1 seconds of duration most of the time during sleep time.     Patient underwent cardioversion in February 24, 2023 with successful restoration to sinus rhythm.     he had recurrence of atrial fibrillation. Patient was admitted for drug load with high risk medication (dofetilide). He converted during hospitalization.     Patient was admitted at [facility] in December 2023.  He was admitted to the regular nursing floor after departing the PACU after undergoing an exploratory laparotomy lysis of adhesions small bowel resection with primary anastomosis. His postop course was relatively smooth although longer than typical. Given the patient's past history it was important for him to have bowel function prior to discharge from the hospital. It took until postop day 5 before he started with BMs and flatus. During this period of time he was able to tolerate liquid however small amounts.      Patient was seen by cardiology service in November 2024 and he  was found to be back in atrial fibrillation.     Patient was seen my office in December 2024.  He was still in atrial fibrillation.  The dose of dofetilide was increased to 500 mcg twice a day.  Subsequent EKG in January 2, 2025 patient was in sinus rhythm.    During the last office visit, he was doing well.  We decided to leave on rate control strategy.    Patient was reevaluated my office in May 2025.  He was complaining of occasional palpitations.  After discussion regarding atrial fibrillation management, dofetilide was discontinued.  Patient was placed on flecainide.  He is currently using flecainide at a dose of 75 mg 1 tablet twice a day.    Patient states that so far he has been doing well.  Denies any symptoms like chest pain but he is monitoring his heart rate at home and all of the underlying irregular-atrial fibrillation.    EKG performed today shows atrial fibrillation with a rate of 61 bpm QRS duration 94 ms QT corrected 414 ms.  Rhythm strip shows the same pattern.      Past Medical History  Medical History[1]    Social History  Social History[2]    Family History   Family History[3]     Allergies:  RX Allergies[4]     Outpatient Medications:  Current Outpatient Medications   Medication Instructions    apixaban (ELIQUIS) 5 mg, oral, 2 times daily    ferrous sulfate (IRON ORAL) 45 mg, As needed    flecainide (TAMBOCOR) 75 mg, oral, 2 times daily, Start on [place], May 18th    lisinopril 20 mg, oral, Daily    magnesium oxide (Mag-Ox) 400 mg (241.3 mg magnesium) tablet 1 tablet, Daily    simvastatin (ZOCOR) 40 mg, oral, Nightly          REVIEW OF SYSTEMS  Review of Systems   Cardiovascular:  Negative for chest pain, dyspnea on exertion and palpitations.   All other systems reviewed and are negative.        VITALS  Vitals:    06/18/25 1448   BP: 122/68   Pulse: 61       PHYSICAL EXAM  Constitutional:       General: Awake.      Appearance: Normal and healthy appearance. Well-developed and not in distress.    Neck:      Vascular: No JVR. JVD normal.   Pulmonary:      Effort: Pulmonary effort is normal.      Breath sounds: Normal breath sounds. No wheezing. No rhonchi. No rales.   Chest:      Chest wall: Not tender to palpatation.   Cardiovascular:      PMI at left midclavicular line. Normal rate. Irregularly irregular rhythm. Normal S1. Normal S2.       Murmurs: There is no murmur.      No gallop.  No click. No rub.      Comments: Atrial fibrillation   Pulses:     Intact distal pulses.   Edema:     Peripheral edema absent.   Abdominal:      Tenderness: There is no abdominal tenderness.   Musculoskeletal: Normal range of motion.         General: No tenderness. Skin:     General: Skin is warm and dry.   Neurological:      General: No focal deficit present.      Mental Status: Alert and oriented to person, place and time.           ASSESSMENT AND PLAN  Clinical impression.     1. Persistent atrial fibrillation  2. Evidence of sinus node dysfunction  3. Hypertension  4. Hyperlipidemia  5. Normal left ventricular function per echogram in January 2023  6. No evidence of ischemia per stress test in 2023  7. Long-term anticoagulant therapy with Eliquis.  8. High risk medication (dofetilide).  Patient has been off dofetilide since May 2025.  Currently on flecainide therapy    Plan recommendation    Patient is using a different rhythm therapy.  Will attempt cardioversion. Procedure, risk, benefits and possible complications were explained to patient.  All questions were answered.  Patient agrees with plan.  Informed consent was signed.    Patient states that he would like to avoid ablation therapy if possible.  Will try only antiarrhythmic therapy.    Continue anticoagulant therapy with Eliquis.    Follow my office 4 to 6 weeks post cardioversion or sooner if needed.    Risk factor modification and lifestyle modification discussed with patient. Diet , exercise and hydration discussed with patient.    I have personally review  with patient during this office visit, laboratory data, echocardiogram results, stress test results, Holter-event monitor results prior and after the last electrophysiology visit. All questions has been answered.    Please excuse any errors in grammar or translation related to this dictation.  Voice recognition software was utilized to prepare this document.        I, Dr. Abraham, personally performed the services described in the documentation as scribed by the nurse in my presence, and confirm it is both accurate and complete.            [1]   Past Medical History:  Diagnosis Date    Anemia     Arrhythmia     Cataract     Encounter for general adult medical examination without abnormal findings 10/11/2021    Encounter for Medicare annual wellness exam    Encounter for general adult medical examination without abnormal findings 05/18/2022    Medicare annual wellness visit, subsequent    Encounter for general adult medical examination without abnormal findings 10/14/2020    Encounter for Medicare annual wellness exam    Encounter for immunization 10/14/2020    Encounter for immunization    Encounter for screening for malignant neoplasm of colon 05/18/2022    Colon cancer screening    Encounter for screening for malignant neoplasm of colon 10/14/2020    Colon cancer screening    Encounter for screening for malignant neoplasm of prostate 09/17/2019    Screening PSA (prostate specific antigen)    Encounter for screening for malignant neoplasm of prostate 05/18/2022    Screening PSA (prostate specific antigen)    Encounter for screening for other disorder 05/18/2022    Special screening for other conditions    GI (gastrointestinal bleed)     History of blood transfusion     Hordeolum externum left eye, unspecified eyelid 05/17/2020    Hordeolum externum of left eye, unspecified eyelid    Hyperlipidemia     Hypertension     Other specified disorders of eye and adnexa 05/17/2020    Redness of eye, left    Other specified  disorders of nose and nasal sinuses 12/14/2020    Sinus drainage    Personal history of other diseases of the nervous system and sense organs 05/17/2020    History of conjunctivitis    Personal history of other drug therapy 10/11/2021    History of influenza vaccination   [2]   Social History  Tobacco Use    Smoking status: Never    Smokeless tobacco: Never   Vaping Use    Vaping status: Never Used   Substance Use Topics    Alcohol use: Not Currently    Drug use: Never   [3]   Family History  Problem Relation Name Age of Onset    Other (cardiac arrhythmia) Father     [4] No Known Allergies

## 2025-06-18 NOTE — PATIENT INSTRUCTIONS
Pre-Procedure Patient Information    You have been scheduled for: cardioversion  At: [facility]--2ND FLOOR OUTPATIENT REGISTRATION  With: Dr. Abraham   Date of procedure: Tuesday, June 24, 2025    1. Please have transportation to and from the hospital. While you should plan for same-day discharge there is a possibility you will need to stay overnight.    2. You will receive a call from the hospital 24 hours before your procedure providing you with fasting instructions, procedure location detail, and time of arrival.  If you have not received a call from the hospital by 6 pm the day before your scheduled procedure, please call [phone].    3. Please bring a current list of medications with you to the hospital.      4. Medications to hold:   NONE       - Otherwise, you may continue your medications in the morning with sips of water.     5. Nothing to eat after midnight before the procedure. OK to take morning medications, with above exceptions, the day of the procedure with a small sip of water.     6. Please bring to our attention if you have any contrast, latex or metal allergies.    7. Please have your blood work as instructed completed at least a day before your procedure.    8. If you have any questions, please contact the office at [phone].      Continue same medications/treatment.  Patient educated on proper medication use.  Patient educated on risk factor modification.  Please bring any lab results from other providers/physicians to your next appointment.    Please bring all medicines, vitamins, and herbal supplements with you when you come to the office.    Prescriptions will not be filled unless you are compliant with your follow up appointments or have a follow up appointment scheduled as per instruction of your physician. Refills should be requested at the time of your visit.    SCHEDULE cardioversion. Obtain labs 1 week prior to procedure.   Follow up with Dr. Abraham 4-6 weeks after  cardioversion    I, Afshan CARRERA RN, AM SCRIBING FOR, AND IN THE PRESENCE OF DR. BELKYS DWYER MD

## 2025-06-19 LAB
ANION GAP SERPL CALCULATED.4IONS-SCNC: 7 MMOL/L (CALC) (ref 7–17)
BUN SERPL-MCNC: 15 MG/DL (ref 7–25)
BUN/CREAT SERPL: NORMAL (CALC) (ref 6–22)
CALCIUM SERPL-MCNC: 9.1 MG/DL (ref 8.6–10.3)
CHLORIDE SERPL-SCNC: 106 MMOL/L (ref 98–110)
CO2 SERPL-SCNC: 26 MMOL/L (ref 20–32)
CREAT SERPL-MCNC: 1.26 MG/DL (ref 0.7–1.28)
EGFRCR SERPLBLD CKD-EPI 2021: 61 ML/MIN/1.73M2
ERYTHROCYTE [DISTWIDTH] IN BLOOD BY AUTOMATED COUNT: 14.5 % (ref 11–15)
GLUCOSE SERPL-MCNC: 109 MG/DL (ref 65–139)
HCT VFR BLD AUTO: 42.7 % (ref 38.5–50)
HGB BLD-MCNC: 14.2 G/DL (ref 13.2–17.1)
INR PPP: 1.1
MCH RBC QN AUTO: 29.6 PG (ref 27–33)
MCHC RBC AUTO-ENTMCNC: 33.3 G/DL (ref 32–36)
MCV RBC AUTO: 89 FL (ref 80–100)
PLATELET # BLD AUTO: 254 THOUSAND/UL (ref 140–400)
PMV BLD REES-ECKER: 9.8 FL (ref 7.5–12.5)
POTASSIUM SERPL-SCNC: 4.6 MMOL/L (ref 3.5–5.3)
PROTHROMBIN TIME: 11.2 SEC (ref 9–11.5)
RBC # BLD AUTO: 4.8 MILLION/UL (ref 4.2–5.8)
SODIUM SERPL-SCNC: 139 MMOL/L (ref 135–146)
WBC # BLD AUTO: 4.5 THOUSAND/UL (ref 3.8–10.8)

## 2025-06-23 ENCOUNTER — TELEPHONE (OUTPATIENT)
Dept: CARDIOLOGY | Facility: HOSPITAL | Age: 70
End: 2025-06-23

## 2025-06-24 ENCOUNTER — ANESTHESIA (OUTPATIENT)
Dept: CARDIOLOGY | Facility: HOSPITAL | Age: 70
End: 2025-06-24
Payer: MEDICARE

## 2025-06-24 ENCOUNTER — HOSPITAL ENCOUNTER (OUTPATIENT)
Dept: CARDIOLOGY | Facility: HOSPITAL | Age: 70
Discharge: HOME | End: 2025-06-24
Payer: MEDICARE

## 2025-06-24 ENCOUNTER — ANESTHESIA EVENT (OUTPATIENT)
Dept: CARDIOLOGY | Facility: HOSPITAL | Age: 70
End: 2025-06-24
Payer: MEDICARE

## 2025-06-24 VITALS
OXYGEN SATURATION: 99 % | TEMPERATURE: 96.6 F | RESPIRATION RATE: 16 BRPM | HEART RATE: 60 BPM | WEIGHT: 208.34 LBS | DIASTOLIC BLOOD PRESSURE: 69 MMHG | SYSTOLIC BLOOD PRESSURE: 132 MMHG | HEIGHT: 71 IN | BODY MASS INDEX: 29.17 KG/M2

## 2025-06-24 DIAGNOSIS — I48.0 PAROXYSMAL ATRIAL FIBRILLATION (MULTI): ICD-10-CM

## 2025-06-24 LAB
ANION GAP SERPL CALC-SCNC: 12 MMOL/L (ref 10–20)
APTT PPP: 33 SECONDS (ref 26–36)
BODY SURFACE AREA: 2.18 M2
BUN SERPL-MCNC: 15 MG/DL (ref 6–23)
CALCIUM SERPL-MCNC: 9.2 MG/DL (ref 8.6–10.3)
CHLORIDE SERPL-SCNC: 107 MMOL/L (ref 98–107)
CO2 SERPL-SCNC: 25 MMOL/L (ref 21–32)
CREAT SERPL-MCNC: 1.2 MG/DL (ref 0.5–1.3)
EGFRCR SERPLBLD CKD-EPI 2021: 64 ML/MIN/1.73M*2
GLUCOSE SERPL-MCNC: 130 MG/DL (ref 74–99)
INR PPP: 1.4 (ref 0.9–1.1)
POTASSIUM SERPL-SCNC: 4.6 MMOL/L (ref 3.5–5.3)
PROTHROMBIN TIME: 15.6 SECONDS (ref 9.8–12.4)
SODIUM SERPL-SCNC: 139 MMOL/L (ref 136–145)

## 2025-06-24 PROCEDURE — 2500000004 HC RX 250 GENERAL PHARMACY W/ HCPCS (ALT 636 FOR OP/ED): Mod: JW | Performed by: REGISTERED NURSE

## 2025-06-24 PROCEDURE — 36415 COLL VENOUS BLD VENIPUNCTURE: CPT | Performed by: NURSE PRACTITIONER

## 2025-06-24 PROCEDURE — 80048 BASIC METABOLIC PNL TOTAL CA: CPT | Performed by: NURSE PRACTITIONER

## 2025-06-24 PROCEDURE — 92960 CARDIOVERSION ELECTRIC EXT: CPT | Mod: 59

## 2025-06-24 PROCEDURE — 3700000001 HC GENERAL ANESTHESIA TIME - INITIAL BASE CHARGE

## 2025-06-24 PROCEDURE — 7100000002 HC RECOVERY ROOM TIME - EACH INCREMENTAL 1 MINUTE

## 2025-06-24 PROCEDURE — 7100000001 HC RECOVERY ROOM TIME - INITIAL BASE CHARGE

## 2025-06-24 PROCEDURE — 93005 ELECTROCARDIOGRAM TRACING: CPT

## 2025-06-24 PROCEDURE — 85610 PROTHROMBIN TIME: CPT | Performed by: NURSE PRACTITIONER

## 2025-06-24 PROCEDURE — 3700000002 HC GENERAL ANESTHESIA TIME - EACH INCREMENTAL 1 MINUTE

## 2025-06-24 PROCEDURE — 92960 CARDIOVERSION ELECTRIC EXT: CPT | Performed by: INTERNAL MEDICINE

## 2025-06-24 RX ORDER — PROPOFOL 10 MG/ML
INJECTION, EMULSION INTRAVENOUS AS NEEDED
Status: DISCONTINUED | OUTPATIENT
Start: 2025-06-24 | End: 2025-06-24

## 2025-06-24 RX ADMIN — PROPOFOL 50 MG: 10 INJECTION, EMULSION INTRAVENOUS at 09:10

## 2025-06-24 ASSESSMENT — PAIN - FUNCTIONAL ASSESSMENT
PAIN_FUNCTIONAL_ASSESSMENT: 0-10

## 2025-06-24 ASSESSMENT — COLUMBIA-SUICIDE SEVERITY RATING SCALE - C-SSRS
2. HAVE YOU ACTUALLY HAD ANY THOUGHTS OF KILLING YOURSELF?: NO
1. IN THE PAST MONTH, HAVE YOU WISHED YOU WERE DEAD OR WISHED YOU COULD GO TO SLEEP AND NOT WAKE UP?: NO
6. HAVE YOU EVER DONE ANYTHING, STARTED TO DO ANYTHING, OR PREPARED TO DO ANYTHING TO END YOUR LIFE?: NO

## 2025-06-24 ASSESSMENT — PAIN SCALES - GENERAL
PAINLEVEL_OUTOF10: 0 - NO PAIN
PAINLEVEL_OUTOF10: 0 - NO PAIN
PAIN_LEVEL: 0
PAINLEVEL_OUTOF10: 0 - NO PAIN

## 2025-06-24 NOTE — ANESTHESIA PREPROCEDURE EVALUATION
Patient: Vic Wise    Procedure Information       Date/Time: 06/24/25 0900    Scheduled providers: Richard Abraham MD    Procedure: CARDIOVERSION EXTERNAL    Location: [facility]            Relevant Problems   Cardiac   (+) Abnormal EKG   (+) Atrial fibrillation (Multi)   (+) Essential hypertension   (+) HTN (hypertension)   (+) Hypercholesterolemia   (+) Hyperlipidemia   (+) Nonrheumatic mitral valve regurgitation   (+) Paroxysmal atrial fibrillation (Multi)   (+) Sinus node dysfunction (Multi)      Pulmonary   (+) SERGIO (obstructive sleep apnea)      Neuro   (+) Peripheral neuropathy      /Renal   (+) BPH (benign prostatic hyperplasia)      Endocrine   (+) Class 1 obesity with body mass index (BMI) of 30.0 to 30.9 in adult      Hematology   (+) Chronic anticoagulation      Musculoskeletal   (+) Primary osteoarthritis involving multiple joints      Skin   (+) Eczema       Clinical information reviewed:   Tobacco  Allergies  Meds   Med Hx  Surg Hx   Fam Hx  Soc Hx        NPO Detail:  NPO/Void Status  Date of Last Liquid: 06/24/25  Time of Last Liquid: 0600  Date of Last Solid: 06/23/25  Time of Last Solid: 1900  Time of Last Void: 0725         Physical Exam    Airway  Mallampati: II     Cardiovascular   Rhythm: irregular  Rate: abnormal     Dental - normal exam     Pulmonary - normal exam   Abdominal - normal examAbdomen: soft             Anesthesia Plan    History of general anesthesia?: yes  History of complications of general anesthesia?: no    ASA 3     MAC     intravenous induction   Anesthetic plan and risks discussed with patient.    Plan discussed with CRNA.

## 2025-06-24 NOTE — NURSING NOTE
Patient cardioverted in [facility]IU by Dr. Abraham.  Post cardioversion, focused assessment completed and WDL.  Patient provided with water but did not want anything else to eat or drink at this time.  Patient's wife is bedside.

## 2025-06-24 NOTE — ANESTHESIA POSTPROCEDURE EVALUATION
Patient: Vic Wise    Procedure Summary       Date: 06/24/25 Room / Location: [facility]    Anesthesia Start: 0859 Anesthesia Stop:     Procedure: CARDIOVERSION EXTERNAL Diagnosis: Paroxysmal atrial fibrillation (Multi)    Scheduled Providers: Richard Abraham MD Responsible Provider: Gunnar Craig MD    Anesthesia Type: MAC ASA Status: 3            Anesthesia Type: MAC    Vitals Value Taken Time   /81 06/24/25 09:12   Temp 36.5 06/24/25 09:12   Pulse 58 06/24/25 09:12   Resp 16 06/24/25 09:12   SpO2 99 06/24/25 09:12       Anesthesia Post Evaluation    Patient location during evaluation: bedside  Patient participation: complete - patient participated  Level of consciousness: awake and alert  Pain score: 0  Pain management: adequate  Airway patency: patent  Cardiovascular status: acceptable and stable  Respiratory status: acceptable and room air  Hydration status: acceptable  Postoperative Nausea and Vomiting: none        There were no known notable events for this encounter.

## 2025-06-24 NOTE — NURSING NOTE
Patient alert x 3, no complaints, VSS wife at bedside.  Ambulating to the bathroom without assist.  Discharge instructions given including activity restrictions, resuming current medications and follow-up appt with repeat response given to nurse.

## 2025-06-25 LAB
ATRIAL RATE: 315 BPM
ATRIAL RATE: 57 BPM
P AXIS: 57 DEGREES
P OFFSET: 129 MS
P ONSET: 47 MS
PR INTERVAL: 320 MS
Q ONSET: 206 MS
Q ONSET: 207 MS
QRS COUNT: 8 BEATS
QRS COUNT: 9 BEATS
QRS DURATION: 100 MS
QRS DURATION: 98 MS
QT INTERVAL: 446 MS
QT INTERVAL: 448 MS
QTC CALCULATION(BAZETT): 398 MS
QTC CALCULATION(BAZETT): 436 MS
QTC FREDERICIA: 414 MS
QTC FREDERICIA: 440 MS
R AXIS: 2 DEGREES
R AXIS: 6 DEGREES
T AXIS: 26 DEGREES
T AXIS: 27 DEGREES
T OFFSET: 429 MS
T OFFSET: 431 MS
VENTRICULAR RATE: 48 BPM
VENTRICULAR RATE: 57 BPM

## 2025-06-30 DIAGNOSIS — I10 HYPERTENSION, UNSPECIFIED TYPE: ICD-10-CM

## 2025-06-30 RX ORDER — LISINOPRIL 20 MG/1
20 TABLET ORAL DAILY
Qty: 90 TABLET | Refills: 1 | Status: SHIPPED | OUTPATIENT
Start: 2025-06-30

## 2025-07-26 DIAGNOSIS — E78.00 HYPERCHOLESTEROLEMIA: ICD-10-CM

## 2025-07-28 ENCOUNTER — APPOINTMENT (OUTPATIENT)
Dept: PRIMARY CARE | Facility: CLINIC | Age: 70
End: 2025-07-28
Payer: MEDICARE

## 2025-07-28 VITALS
HEART RATE: 57 BPM | DIASTOLIC BLOOD PRESSURE: 82 MMHG | TEMPERATURE: 98 F | OXYGEN SATURATION: 96 % | BODY MASS INDEX: 29.12 KG/M2 | HEIGHT: 71 IN | SYSTOLIC BLOOD PRESSURE: 136 MMHG | WEIGHT: 208 LBS | RESPIRATION RATE: 16 BRPM

## 2025-07-28 DIAGNOSIS — M15.0 PRIMARY OSTEOARTHRITIS INVOLVING MULTIPLE JOINTS: ICD-10-CM

## 2025-07-28 DIAGNOSIS — Z00.00 MEDICARE ANNUAL WELLNESS VISIT, SUBSEQUENT: Primary | ICD-10-CM

## 2025-07-28 DIAGNOSIS — I10 HYPERTENSION, UNSPECIFIED TYPE: ICD-10-CM

## 2025-07-28 DIAGNOSIS — I48.0 PAROXYSMAL ATRIAL FIBRILLATION (MULTI): ICD-10-CM

## 2025-07-28 DIAGNOSIS — E78.00 HYPERCHOLESTEROLEMIA: ICD-10-CM

## 2025-07-28 DIAGNOSIS — Z12.5 SCREENING PSA (PROSTATE SPECIFIC ANTIGEN): ICD-10-CM

## 2025-07-28 DIAGNOSIS — E11.9 DIABETES MELLITUS WITHOUT COMPLICATION: ICD-10-CM

## 2025-07-28 DIAGNOSIS — N18.31 CHRONIC KIDNEY DISEASE, STAGE 3A (MULTI): ICD-10-CM

## 2025-07-28 PROCEDURE — G0439 PPPS, SUBSEQ VISIT: HCPCS | Performed by: FAMILY MEDICINE

## 2025-07-28 PROCEDURE — 1170F FXNL STATUS ASSESSED: CPT | Performed by: FAMILY MEDICINE

## 2025-07-28 PROCEDURE — 1036F TOBACCO NON-USER: CPT | Performed by: FAMILY MEDICINE

## 2025-07-28 PROCEDURE — 99213 OFFICE O/P EST LOW 20 MIN: CPT | Performed by: FAMILY MEDICINE

## 2025-07-28 PROCEDURE — 3079F DIAST BP 80-89 MM HG: CPT | Performed by: FAMILY MEDICINE

## 2025-07-28 PROCEDURE — 1160F RVW MEDS BY RX/DR IN RCRD: CPT | Performed by: FAMILY MEDICINE

## 2025-07-28 PROCEDURE — 4010F ACE/ARB THERAPY RXD/TAKEN: CPT | Performed by: FAMILY MEDICINE

## 2025-07-28 PROCEDURE — 1159F MED LIST DOCD IN RCRD: CPT | Performed by: FAMILY MEDICINE

## 2025-07-28 PROCEDURE — 3075F SYST BP GE 130 - 139MM HG: CPT | Performed by: FAMILY MEDICINE

## 2025-07-28 PROCEDURE — 3008F BODY MASS INDEX DOCD: CPT | Performed by: FAMILY MEDICINE

## 2025-07-28 RX ORDER — LANOLIN ALCOHOL/MO/W.PET/CERES
1 CREAM (GRAM) TOPICAL DAILY
Qty: 90 TABLET | Refills: 1 | Status: SHIPPED | OUTPATIENT
Start: 2025-07-28

## 2025-07-28 RX ORDER — LISINOPRIL 20 MG/1
20 TABLET ORAL DAILY
Qty: 90 TABLET | Refills: 1 | Status: SHIPPED | OUTPATIENT
Start: 2025-07-28

## 2025-07-28 RX ORDER — FLECAINIDE ACETATE 50 MG/1
75 TABLET ORAL 2 TIMES DAILY
Qty: 180 TABLET | Refills: 1 | Status: SHIPPED | OUTPATIENT
Start: 2025-07-28 | End: 2025-07-31 | Stop reason: SDUPTHER

## 2025-07-28 RX ORDER — PREDNISONE 10 MG/1
TABLET ORAL
Qty: 24 TABLET | Refills: 0 | Status: SHIPPED | OUTPATIENT
Start: 2025-07-28

## 2025-07-28 RX ORDER — SIMVASTATIN 40 MG/1
40 TABLET, FILM COATED ORAL NIGHTLY
Qty: 90 TABLET | Refills: 1 | Status: SHIPPED | OUTPATIENT
Start: 2025-07-28

## 2025-07-28 ASSESSMENT — ACTIVITIES OF DAILY LIVING (ADL)
TAKING_MEDICATION: INDEPENDENT
GROCERY_SHOPPING: INDEPENDENT
DRESSING: INDEPENDENT
BATHING: INDEPENDENT
DOING_HOUSEWORK: INDEPENDENT
MANAGING_FINANCES: INDEPENDENT

## 2025-07-28 ASSESSMENT — PATIENT HEALTH QUESTIONNAIRE - PHQ9
2. FEELING DOWN, DEPRESSED OR HOPELESS: NOT AT ALL
2. FEELING DOWN, DEPRESSED OR HOPELESS: NOT AT ALL
SUM OF ALL RESPONSES TO PHQ9 QUESTIONS 1 AND 2: 0
2. FEELING DOWN, DEPRESSED OR HOPELESS: NOT AT ALL
1. LITTLE INTEREST OR PLEASURE IN DOING THINGS: NOT AT ALL
1. LITTLE INTEREST OR PLEASURE IN DOING THINGS: NOT AT ALL
SUM OF ALL RESPONSES TO PHQ9 QUESTIONS 1 AND 2: 0
1. LITTLE INTEREST OR PLEASURE IN DOING THINGS: NOT AT ALL
SUM OF ALL RESPONSES TO PHQ9 QUESTIONS 1 AND 2: 0

## 2025-07-28 ASSESSMENT — ENCOUNTER SYMPTOMS
OCCASIONAL FEELINGS OF UNSTEADINESS: 0
DEPRESSION: 0
LOSS OF SENSATION IN FEET: 0

## 2025-07-28 NOTE — PROGRESS NOTES
"Subjective   Patient ID: Vic Wise is a 70 y.o. male who presents for Medicare Annual Wellness Visit Subsequent, Hypertension, and Hyperlipidemia.  HPI    Patient has no complaints.    Taking current medications which were reviewed.  Problem list discussed.    Overall doing well.  Eating okay.  Staying active.    Has no other new problem /question.     ROS  Constitutional- No activity change. No appetite change.  Eyes- Denies vision changes.  Respiratory- No shortness of breath.  Cardiovascular- No palpitations. No chest pain.  GI- No nausea or vomiting. No diarrhea or constipation. Denies abdominal pain.  Musculoskeletal- Denies joint swelling.  Extremities- No edema.  Neurological- Denies headaches. Denies dizziness.  Skin- No rashes.  Psychiatric/Behavioral- Denies significant anxiety, or depressed mood.     Objective     /82   Pulse 57   Temp 36.7 °C (98 °F)   Resp 16   Ht 1.803 m (5' 11\")   Wt 94.3 kg (208 lb)   SpO2 96%   BMI 29.01 kg/m²     Allergies[1]    Constitutional-- Well-nourished.  No distress  Head- unremarkable.  Eyes- PERRL.  Conjunctiva normal.  Nose- Normal.  No rhinorrhea noted.  Throat- Oropharynx is clear and moist.  Neck- Supple with no thyromegaly.  No significant cervical adenopathy noted.  Pulmonary/Chest- Breath sounds normal with normal effort.  No wheezing.  Heart- Regular rate and rhythm.  No murmur.  Abdomen- Soft and non-tender.  No masses noted.  Musculoskeletal- Normal ROM.  No significant joint swelling  Extremities- No edema.   Neurological- Alert.  No noted deficits.  Skin- Warm.  No rashes.  Psychiatric/Behavioral- Mood and affect normal.  Behavior normal.     Assessment/Plan   1. Medicare annual wellness visit, subsequent        2. Paroxysmal atrial fibrillation (Multi)  apixaban (Eliquis) 5 mg tablet    flecainide (Tambocor) 50 mg tablet    magnesium oxide (Mag-Ox) 400 mg (241.3 mg elemental) tablet      3. Hypertension, unspecified type  lisinopril 20 mg " tablet    magnesium oxide (Mag-Ox) 400 mg (241.3 mg elemental) tablet      4. Hypercholesterolemia  simvastatin (Zocor) 40 mg tablet      5. Screening PSA (prostate specific antigen)  Prostate Specific Antigen, Screen    Prostate Specific Antigen, Screen      6. Primary osteoarthritis involving multiple joints  predniSONE (Deltasone) 10 mg tablet      7. Diabetes mellitus without complication        8. Chronic kidney disease, stage 3a (Multi)               Long talk. Treatment options reviewed.  Medicare wellness questionnaire reviewed.  Home safety issues discussed.  Advance care planning reviewed    Hypertension controlled  Arthritis stable  Mild renal insufficiency stable    Continue and take your medications as prescribed.    Health Maintenance issues discussed.    Importance of healthy diet and regular exercise regimen discussed.      Follow-up as instructed or sooner if any problems or symptoms do not resolve as expected.    All medical record entries made by the Scribe were at my direction and personally dictated by me.    I have reviewed the chart and agree that the record accurately reflects my personal performance of the history, physical exam, discussion, and plan.         [1] No Known Allergies

## 2025-07-29 RX ORDER — SIMVASTATIN 40 MG/1
40 TABLET, FILM COATED ORAL NIGHTLY
Qty: 90 TABLET | Refills: 1 | Status: SHIPPED | OUTPATIENT
Start: 2025-07-29

## 2025-07-29 NOTE — TELEPHONE ENCOUNTER
Recent Visits  Date Type Provider Dept   07/28/25 Office Visit William Dasilva MD [id number]   Showing recent visits within past 180 days and meeting all other requirements  Future Appointments  No visits were found meeting these conditions.  Showing future appointments within next 90 days and meeting all other requirements

## 2025-07-30 ENCOUNTER — APPOINTMENT (OUTPATIENT)
Dept: PRIMARY CARE | Facility: CLINIC | Age: 70
End: 2025-07-30
Payer: MEDICARE

## 2025-07-31 ENCOUNTER — OFFICE VISIT (OUTPATIENT)
Dept: CARDIOLOGY | Facility: CLINIC | Age: 70
End: 2025-07-31
Payer: MEDICARE

## 2025-07-31 ENCOUNTER — APPOINTMENT (OUTPATIENT)
Dept: CARDIOLOGY | Facility: CLINIC | Age: 70
End: 2025-07-31
Payer: MEDICARE

## 2025-07-31 VITALS
WEIGHT: 208 LBS | HEIGHT: 71 IN | BODY MASS INDEX: 29.12 KG/M2 | DIASTOLIC BLOOD PRESSURE: 80 MMHG | SYSTOLIC BLOOD PRESSURE: 122 MMHG | HEART RATE: 60 BPM

## 2025-07-31 DIAGNOSIS — Z79.899 HIGH RISK MEDICATION USE: ICD-10-CM

## 2025-07-31 DIAGNOSIS — I10 PRIMARY HYPERTENSION: ICD-10-CM

## 2025-07-31 DIAGNOSIS — I49.5 SINUS NODE DYSFUNCTION (MULTI): ICD-10-CM

## 2025-07-31 DIAGNOSIS — Z51.81 ANTICOAGULATION MANAGEMENT ENCOUNTER: ICD-10-CM

## 2025-07-31 DIAGNOSIS — R94.31 ABNORMAL EKG: ICD-10-CM

## 2025-07-31 DIAGNOSIS — Z78.9 NEVER SMOKED CIGARETTES: ICD-10-CM

## 2025-07-31 DIAGNOSIS — Z79.01 ANTICOAGULATION MANAGEMENT ENCOUNTER: ICD-10-CM

## 2025-07-31 DIAGNOSIS — I48.0 PAROXYSMAL ATRIAL FIBRILLATION (MULTI): Primary | ICD-10-CM

## 2025-07-31 PROCEDURE — 93000 ELECTROCARDIOGRAM COMPLETE: CPT | Performed by: INTERNAL MEDICINE

## 2025-07-31 PROCEDURE — 3074F SYST BP LT 130 MM HG: CPT | Performed by: INTERNAL MEDICINE

## 2025-07-31 PROCEDURE — 3008F BODY MASS INDEX DOCD: CPT | Performed by: INTERNAL MEDICINE

## 2025-07-31 PROCEDURE — 1036F TOBACCO NON-USER: CPT | Performed by: INTERNAL MEDICINE

## 2025-07-31 PROCEDURE — 99215 OFFICE O/P EST HI 40 MIN: CPT | Performed by: INTERNAL MEDICINE

## 2025-07-31 PROCEDURE — 1159F MED LIST DOCD IN RCRD: CPT | Performed by: INTERNAL MEDICINE

## 2025-07-31 PROCEDURE — 4010F ACE/ARB THERAPY RXD/TAKEN: CPT | Performed by: INTERNAL MEDICINE

## 2025-07-31 PROCEDURE — 3079F DIAST BP 80-89 MM HG: CPT | Performed by: INTERNAL MEDICINE

## 2025-07-31 RX ORDER — FLECAINIDE ACETATE 50 MG/1
75 TABLET ORAL 2 TIMES DAILY
Qty: 180 TABLET | Refills: 1 | Status: SHIPPED | OUTPATIENT
Start: 2025-07-31 | End: 2025-11-28

## 2025-07-31 ASSESSMENT — ENCOUNTER SYMPTOMS
PALPITATIONS: 0
DYSPNEA ON EXERTION: 0

## 2025-07-31 NOTE — PROGRESS NOTES
CARDIOLOGY OFFICE VISIT      CHIEF COMPLAINT  Chief Complaint   Patient presents with    Follow-up     4 to 6 weeks follow up for Paroxysmal atrial fibrillation (Multi)       HISTORY OF PRESENT ILLNESS  HPI    70-year-old male with a past medical history of hypertension, hyperlipidemia. Patient noticed some palpitations and fatigue back in December 2022. He was diagnosed of atrial fibrillation by primary care physician. He was placed on Eliquis therapy.     An echocardiogram January 2023 shows normal left ventricular function value of 60 to 65% with 2+ mitral regurgitation 1+ tricuspid regurgitation. Exercise treadmill stress test in January 2022 was negative for significant ST changes. He achieved 4.6 METS and 104% maximal heart rate predicted. Holter monitoring January 2023 shows underlying rhythm of atrial fibrillation with a maximal heart rate of 141 bpm average heart rate of 79 bpm minimum heart rate of 36 bpm. There were frequent pauses up to 3.1 seconds of duration most of the time during sleep time.     Patient underwent cardioversion in February 24, 2023 with successful restoration to sinus rhythm.     he had recurrence of atrial fibrillation. Patient was admitted for drug load with high risk medication (dofetilide). He converted during hospitalization.     Patient was admitted at [facility] in December 2023.  He was admitted to the regular nursing floor after departing the PACU after undergoing an exploratory laparotomy lysis of adhesions small bowel resection with primary anastomosis. His postop course was relatively smooth although longer than typical. Given the patient's past history it was important for him to have bowel function prior to discharge from the hospital. It took until postop day 5 before he started with BMs and flatus. During this period of time he was able to tolerate liquid however small amounts.      Patient was seen by cardiology service in November 2024 and he was found to  be back in atrial fibrillation.     Patient was seen my office in December 2024.  He was still in atrial fibrillation.  The dose of dofetilide was increased to 500 mcg twice a day.  Subsequent EKG in January 2, 2025 patient was in sinus rhythm.     During the last office visit, he was doing well.  We decided to leave on rate control strategy.     Patient was reevaluated my office in May 2025.  He was complaining of occasional palpitations.  After discussion regarding atrial fibrillation management, dofetilide was discontinued.  Patient was placed on flecainide.  He is currently using flecainide at a dose of 75 mg 1 tablet twice a day.    Patient underwent cardioversion in June 2024 with no complications.    Patient states that so far he has been doing well.  Denies any symptoms like chest pain or shortness breath or palpitations.    EKG performed today shows atrial flutter with variable AV conduction heart rate of 60 bpm QRS ration 90 ms QT corrected 444 ms.  Rhythm strip shows the same pattern.        Past Medical History  Medical History[1]    Social History  Social History[2]    Family History   Family History[3]     Allergies:  RX Allergies[4]     Outpatient Medications:  Current Outpatient Medications   Medication Instructions    apixaban (ELIQUIS) 5 mg, oral, 2 times daily    ferrous sulfate (IRON ORAL) 45 mg, As needed    flecainide (TAMBOCOR) 75 mg, oral, 2 times daily, Start on [place], May 18th    lisinopril 20 mg, oral, Daily    magnesium oxide (Mag-Ox) 400 mg (241.3 mg elemental) tablet 1 tablet, oral, Daily    predniSONE (Deltasone) 10 mg tablet TID FOR 5 DAYS THEN BID FOR 3 DAYS THEN 1 DAILY    simvastatin (ZOCOR) 40 mg, oral, Nightly    simvastatin (ZOCOR) 40 mg, oral, Nightly          REVIEW OF SYSTEMS  Review of Systems   Cardiovascular:  Negative for chest pain, dyspnea on exertion and palpitations.   All other systems reviewed and are negative.        VITALS  Vitals:    07/31/25 1515   BP: 122/80    Pulse: 60       PHYSICAL EXAM  Constitutional:       General: Awake.      Appearance: Normal and healthy appearance. Well-developed and not in distress.   Neck:      Vascular: No JVR. JVD normal.   Pulmonary:      Effort: Pulmonary effort is normal.      Breath sounds: Normal breath sounds. No wheezing. No rhonchi. No rales.   Chest:      Chest wall: Not tender to palpatation.   Cardiovascular:      PMI at left midclavicular line. Normal rate. Irregularly irregular rhythm. Normal S1. Normal S2.       Murmurs: There is no murmur.      No gallop.  No click. No rub.      Comments: Atrial fibrillation  Pulses:     Intact distal pulses.   Edema:     Peripheral edema absent.   Abdominal:      Tenderness: There is no abdominal tenderness.   Musculoskeletal: Normal range of motion.         General: No tenderness. Skin:     General: Skin is warm and dry.   Neurological:      General: No focal deficit present.      Mental Status: Alert and oriented to person, place and time.         ASSESSMENT AND PLAN  Clinical impression.     1. Persistent atrial fibrillation  2. Evidence of sinus node dysfunction  3. Hypertension  4. Hyperlipidemia  5. Normal left ventricular function per echogram in January 2023  6. No evidence of ischemia per stress test in 2023  7. Long-term anticoagulant therapy with Eliquis.  8. High risk medication (dofetilide).  Patient has been off dofetilide since May 2025.  Currently on flecainide therapy      Plan recommendation    Patient had recurrence of atrial flutter-atrial fibrillation.  Asymptomatic.  We had a long discussion regarding management for his arrhythmia with rate control-rhythm control strategy.  Patient states that he does not feel any symptoms.  He would like to be in observation for now.    Follow my office in 3 months or sooner if needed.    If he is still asymptomatic during the flutters and rates are controlled during this arrhythmia, we can discontinue flecainide and put him on  low-dose beta-blocker therapy.    Continue Eliquis therapy.    Risk factor modification and lifestyle modification discussed with patient. Diet , exercise and hydration discussed with patient.    I have personally review with patient during this office visit, laboratory data, echocardiogram results, stress test results, Holter-event monitor results prior and after the last electrophysiology visit. All questions has been answered.    Please excuse any errors in grammar or translation related to this dictation.  Voice recognition software was utilized to prepare this document.      I, Dr. Abraham, personally performed the services described in the documentation as scribed by the nurse in my presence, and confirm it is both accurate and complete.            [1]   Past Medical History:  Diagnosis Date    Anemia     Arrhythmia     Cataract     Encounter for general adult medical examination without abnormal findings 10/11/2021    Encounter for Medicare annual wellness exam    Encounter for general adult medical examination without abnormal findings 05/18/2022    Medicare annual wellness visit, subsequent    Encounter for general adult medical examination without abnormal findings 10/14/2020    Encounter for Medicare annual wellness exam    Encounter for immunization 10/14/2020    Encounter for immunization    Encounter for screening for malignant neoplasm of colon 05/18/2022    Colon cancer screening    Encounter for screening for malignant neoplasm of colon 10/14/2020    Colon cancer screening    Encounter for screening for malignant neoplasm of prostate 09/17/2019    Screening PSA (prostate specific antigen)    Encounter for screening for malignant neoplasm of prostate 05/18/2022    Screening PSA (prostate specific antigen)    Encounter for screening for other disorder 05/18/2022    Special screening for other conditions    GI (gastrointestinal bleed)     History of blood transfusion     Hordeolum externum left eye,  unspecified eyelid 05/17/2020    Hordeolum externum of left eye, unspecified eyelid    Hyperlipidemia     Hypertension     Other specified disorders of eye and adnexa 05/17/2020    Redness of eye, left    Other specified disorders of nose and nasal sinuses 12/14/2020    Sinus drainage    Personal history of other diseases of the nervous system and sense organs 05/17/2020    History of conjunctivitis    Personal history of other drug therapy 10/11/2021    History of influenza vaccination   [2]   Social History  Tobacco Use    Smoking status: Never    Smokeless tobacco: Never   Vaping Use    Vaping status: Never Used   Substance Use Topics    Alcohol use: Not Currently    Drug use: Never   [3]   Family History  Problem Relation Name Age of Onset    Other (cardiac arrhythmia) Father     [4] No Known Allergies

## 2025-07-31 NOTE — PATIENT INSTRUCTIONS
Continue same medications/treatment.  Patient educated on proper medication use.  Patient educated on risk factor modification.  Please bring any lab results from other providers/physicians to your next appointment.    Please bring all medicines, vitamins, and herbal supplements with you when you come to the office.    Prescriptions will not be filled unless you are compliant with your follow up appointments or have a follow up appointment scheduled as per instruction of your physician. Refills should be requested at the time of your visit.    Follow up with Dr. Dwyer in 3 months    Afshan VENCES RN, AM SCRIBING FOR, AND IN THE PRESENCE OF DR. BELKYS DWYER MD

## 2025-11-12 ENCOUNTER — APPOINTMENT (OUTPATIENT)
Dept: CARDIOLOGY | Facility: CLINIC | Age: 70
End: 2025-11-12
Payer: MEDICARE

## 2026-02-25 ENCOUNTER — APPOINTMENT (OUTPATIENT)
Dept: CARDIOLOGY | Facility: CLINIC | Age: 71
End: 2026-02-25
Payer: MEDICARE

## (undated) DEVICE — TOWEL PACK, STERILE, 4/PACK, BLUE

## (undated) DEVICE — DRESSING, ADHESIVE, ISLAND, TELFA, 4 X 14 IN

## (undated) DEVICE — SUTURE, VICRYL, 3-0, 27 IN, SH

## (undated) DEVICE — CUTTER, PROXIMATE LINEAR RELOAD, 75MM, BLUE

## (undated) DEVICE — Device

## (undated) DEVICE — DRAIN, JP CHANNEL, 15 FR, RND, W/TROCAR

## (undated) DEVICE — SUTURE, PDSII, 1, TP-1, VIL, MONO, 48LP

## (undated) DEVICE — SUTURE, VICRYL, 0, 18 IN,TIE, UNDYED

## (undated) DEVICE — GLOVE, SURGICAL, BIOGEL, 7.5, PF, LATEX, GREEN

## (undated) DEVICE — SUTURE, ETHILON, 2-0, 18 IN, FS, BLACK, BX/12

## (undated) DEVICE — DRAPE, FLUID WARMER

## (undated) DEVICE — SURGISLEEVE, WOUND PROTECTOR, MEDIUM 5-9CM

## (undated) DEVICE — STAPLER, LINEAR, 3.5 60MM, RELOADABLE, BLUE

## (undated) DEVICE — CUTTER,  PROX LINEAR, 75MM, THICK TISSUE, W/ SAFETY LOCK OUT

## (undated) DEVICE — SUTURE, VICRYL, 0, SH 27 TAPER NEEDLE, UNDYED, BRAIDED

## (undated) DEVICE — OSTOMY KIT, POSTOP, COLOSTOMY, 2 PIECE, 2 3/4 INCH

## (undated) DEVICE — SUTURE, VICRYL, 2-0, 18 IN, UNDYED

## (undated) DEVICE — SOLUTION, IRRIGATION, SODIUM CHLORIDE 0.9%, 1000 ML, POUR BOTTLE

## (undated) DEVICE — ELECTRODE, ELECTROSURGICAL, BLADE, EXTENDED

## (undated) DEVICE — SPONGE, LAP, XRAY DECT, 18IN X 18IN, W/MASTER DMT, STERILE

## (undated) DEVICE — CUTTER, PROX LINEAR, 75MM, REG TISSUE, W/ SAFETY LOCK OUT

## (undated) DEVICE — SOLUTION, IRRIGATION, STERILE WATER, 1000 ML, POUR BOTTLE